# Patient Record
Sex: FEMALE | Race: WHITE | NOT HISPANIC OR LATINO | Employment: PART TIME | URBAN - NONMETROPOLITAN AREA
[De-identification: names, ages, dates, MRNs, and addresses within clinical notes are randomized per-mention and may not be internally consistent; named-entity substitution may affect disease eponyms.]

---
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
## 2020-08-19 ENCOUNTER — HOSPITAL ENCOUNTER (EMERGENCY)
Facility: HOSPITAL | Age: 20
Discharge: HOME | End: 2020-08-19
Attending: EMERGENCY MEDICINE | Admitting: EMERGENCY MEDICINE

## 2020-08-19 ENCOUNTER — APPOINTMENT (OUTPATIENT)
Dept: ULTRASOUND IMAGING | Facility: HOSPITAL | Age: 20
End: 2020-08-19

## 2020-08-19 VITALS
WEIGHT: 171 LBS | HEIGHT: 68 IN | SYSTOLIC BLOOD PRESSURE: 112 MMHG | HEART RATE: 92 BPM | OXYGEN SATURATION: 100 % | RESPIRATION RATE: 16 BRPM | DIASTOLIC BLOOD PRESSURE: 66 MMHG | TEMPERATURE: 98.6 F | BODY MASS INDEX: 25.91 KG/M2

## 2020-08-19 DIAGNOSIS — O20.0 THREATENED MISCARRIAGE IN EARLY PREGNANCY: Primary | ICD-10-CM

## 2020-08-19 DIAGNOSIS — O23.41 URINARY TRACT INFECTION AFFECTING CARE OF MOTHER IN FIRST TRIMESTER, ANTEPARTUM: ICD-10-CM

## 2020-08-19 LAB
ABO GROUP BLD: NORMAL
B-HCG UR QL: POSITIVE
BACTERIA UR QL AUTO: ABNORMAL /HPF
BASOPHILS # BLD AUTO: 0.07 10*3/MM3 (ref 0–0.2)
BASOPHILS NFR BLD AUTO: 0.7 % (ref 0–1.5)
BILIRUB UR QL STRIP: NEGATIVE
CLARITY UR: ABNORMAL
COLOR UR: YELLOW
DEPRECATED RDW RBC AUTO: 40.7 FL (ref 37–54)
EOSINOPHIL # BLD AUTO: 0.08 10*3/MM3 (ref 0–0.4)
EOSINOPHIL NFR BLD AUTO: 0.8 % (ref 0.3–6.2)
ERYTHROCYTE [DISTWIDTH] IN BLOOD BY AUTOMATED COUNT: 12.7 % (ref 12.3–15.4)
GLUCOSE UR STRIP-MCNC: NEGATIVE MG/DL
HCG INTACT+B SERPL-ACNC: NORMAL MIU/ML
HCT VFR BLD AUTO: 42.1 % (ref 34–46.6)
HGB BLD-MCNC: 14.6 G/DL (ref 12–15.9)
HGB UR QL STRIP.AUTO: ABNORMAL
HYALINE CASTS UR QL AUTO: ABNORMAL /LPF
IMM GRANULOCYTES # BLD AUTO: 0.06 10*3/MM3 (ref 0–0.05)
IMM GRANULOCYTES NFR BLD AUTO: 0.6 % (ref 0–0.5)
KETONES UR QL STRIP: NEGATIVE
LEUKOCYTE ESTERASE UR QL STRIP.AUTO: ABNORMAL
LYMPHOCYTES # BLD AUTO: 3.38 10*3/MM3 (ref 0.7–3.1)
LYMPHOCYTES NFR BLD AUTO: 32.8 % (ref 19.6–45.3)
MCH RBC QN AUTO: 30.4 PG (ref 26.6–33)
MCHC RBC AUTO-ENTMCNC: 34.7 G/DL (ref 31.5–35.7)
MCV RBC AUTO: 87.5 FL (ref 79–97)
MONOCYTES # BLD AUTO: 0.67 10*3/MM3 (ref 0.1–0.9)
MONOCYTES NFR BLD AUTO: 6.5 % (ref 5–12)
NEUTROPHILS NFR BLD AUTO: 58.6 % (ref 42.7–76)
NEUTROPHILS NFR BLD AUTO: 6.05 10*3/MM3 (ref 1.7–7)
NITRITE UR QL STRIP: NEGATIVE
NRBC BLD AUTO-RTO: 0 /100 WBC (ref 0–0.2)
PH UR STRIP.AUTO: 7 [PH] (ref 5–8)
PLATELET # BLD AUTO: 264 10*3/MM3 (ref 140–450)
PMV BLD AUTO: 10.7 FL (ref 6–12)
PROT UR QL STRIP: ABNORMAL
RBC # BLD AUTO: 4.81 10*6/MM3 (ref 3.77–5.28)
RBC # UR: ABNORMAL /HPF
REF LAB TEST METHOD: ABNORMAL
RH BLD: POSITIVE
SP GR UR STRIP: 1.02 (ref 1–1.03)
SQUAMOUS #/AREA URNS HPF: ABNORMAL /HPF
UROBILINOGEN UR QL STRIP: ABNORMAL
WBC # BLD AUTO: 10.31 10*3/MM3 (ref 3.4–10.8)
WBC UR QL AUTO: ABNORMAL /HPF

## 2020-08-19 PROCEDURE — 84702 CHORIONIC GONADOTROPIN TEST: CPT | Performed by: EMERGENCY MEDICINE

## 2020-08-19 PROCEDURE — 76817 TRANSVAGINAL US OBSTETRIC: CPT

## 2020-08-19 PROCEDURE — 81001 URINALYSIS AUTO W/SCOPE: CPT

## 2020-08-19 PROCEDURE — 85025 COMPLETE CBC W/AUTO DIFF WBC: CPT | Performed by: EMERGENCY MEDICINE

## 2020-08-19 PROCEDURE — 86900 BLOOD TYPING SEROLOGIC ABO: CPT | Performed by: EMERGENCY MEDICINE

## 2020-08-19 PROCEDURE — 99283 EMERGENCY DEPT VISIT LOW MDM: CPT

## 2020-08-19 PROCEDURE — 86901 BLOOD TYPING SEROLOGIC RH(D): CPT | Performed by: EMERGENCY MEDICINE

## 2020-08-19 PROCEDURE — 81025 URINE PREGNANCY TEST: CPT

## 2020-08-19 RX ORDER — PRENATAL VIT/IRON FUM/FOLIC AC 27MG-0.8MG
TABLET ORAL DAILY
COMMUNITY

## 2020-08-19 RX ORDER — CEPHALEXIN 500 MG/1
500 CAPSULE ORAL 4 TIMES DAILY
Qty: 28 CAPSULE | Refills: 0 | Status: SHIPPED | OUTPATIENT
Start: 2020-08-19 | End: 2021-02-08 | Stop reason: HOSPADM

## 2020-08-19 RX ORDER — CHLORAL HYDRATE 500 MG
CAPSULE ORAL
COMMUNITY
End: 2021-04-10 | Stop reason: HOSPADM

## 2020-08-19 NOTE — ED PROVIDER NOTES
Subjective   History of Present Illness    Chief Complaint: Vaginal bleeding, 6 weeks pregnant  History of Present Illness: 20-year-old female primigravida, LMP July 10, 2020, here with vaginal bleeding which she thinks  estimate would soak a pad, occurred this morning, no pelvic pain.  No urinary symptoms.  Unknown blood type.  Not had confirmatory ultrasound yet  Onset: This morning  Duration: Single episode  Exacerbating / Alleviating factors: None  Associated symptoms: None      Nurses Notes reviewed and agree, including vitals, allergies, social history and prior medical history.     REVIEW OF SYSTEMS: All systems reviewed and not pertinent unless noted.    Positive for: Vaginal bleeding in early pregnancy    Negative for: Flank pain urinary symptoms pelvic pain syncope  Review of Systems    Past Medical History:   Diagnosis Date   • Anxiety        Allergies   Allergen Reactions   • Penicillins Hives       Past Surgical History:   Procedure Laterality Date   • ADENOIDECTOMY     • TONSILLECTOMY     • WISDOM TOOTH EXTRACTION         Family History   Problem Relation Age of Onset   • Interstitial cystitis Mother        Social History     Socioeconomic History   • Marital status: Single     Spouse name: Not on file   • Number of children: Not on file   • Years of education: Not on file   • Highest education level: Not on file   Tobacco Use   • Smoking status: Never Smoker   • Smokeless tobacco: Never Used   Substance and Sexual Activity   • Alcohol use: No   • Drug use: No   • Sexual activity: Yes     Partners: Male           Objective   Physical Exam    GENERAL APPEARANCE: Well developed, healthy-appearing 20-year-old white female,  in no acute distress.  VITAL SIGNS: per nursing, reviewed and noted  SKIN: exposed skin with no rashes, ulcerations or petechiae.  Head: Normocephalic, atraumatic.   EYES: perrla. EOMI.  ENT: Normal voice.  Patient maintained wearing a mask throughout patient encounter due to  coronavirus pandemic  LUNGS:  No increased work of breathing. No retractions.   CARDIOVASCULAR:  regular rate and rhythm, no murmurs.  Good Peripheral pulses. Good cap refill to extremities.   ABDOMEN: Soft, nontender, normal bowel sounds. No hernia. No ascites.  MUSCULOSKELETAL:  No tenderness. Full ROM. Strength and tone normal.  NEUROLOGIC: Alert, oriented x 3. No gross deficits. GCS 15.   NECK: Supple, symmetric. No tenderness, no masses. Full ROM  Back: full rom, no paraspinal spasm. No CVA tenderness.   PSYCH: appropriate affect.  : no bladder tenderness or distention, no CVA tenderness      Procedures     No attending physician procedures were performed on this patient.      ED Course  ED Course as of Aug 19 1233   Wed Aug 19, 2020   0851 HCG, Urine QL(!): Positive [PF]   0924 RBC, UA(!): 3-5 [PF]   0924 WBC, UA(!): 3-5 [PF]   0924 Bacteria, UA(!): 1+ [PF]   0924 Squamous Epithelial Cells, UA(!): 7-12 [PF]   0924 Blood, UA(!): Moderate (2+) [PF]   0924 Protein, UA(!): Trace [PF]   0924 Leukocytes, UA(!): Large (3+) [PF]   0924 Nitrite, UA: Negative [PF]   0944 WBC: 10.31 [PF]   0944 Hemoglobin: 14.6 [PF]   0944 Hematocrit: 42.1 [PF]   0944 Platelets: 264 [PF]   1028 ABO Type: AB [PF]   1028 RH type: Positive [PF]   1211 HISTORY: vaginal bleeding, appr. 6 weeks gestation     TECHNIQUE: Sonographic images of the pelvis were obtained  transvaginally.     FINDINGS: A gestational sac  is present within the uterus containing a  single fetus corresponding to a six week gestation. Fetal heart tones  are present at 120 bpm. There is no evidence of a significant   subchorionic hemorrhage. The ovaries are unremarkable and demonstrate  appropriate blood flow. No adnexal masses seen. There is a small amount  of free fluid in the pelvis.     IMPRESSION:  Impression: Living intrauterine pregnancy.     This report was finalized on 8/19/2020 11:45 AM by Micaela Perez M.D..    [PF]      ED Course User Index  [PF]  Cedric Gomez, DO                                           [facility]  20-year-old primigravida presents with threatened miscarriage approximately 6 weeks gestation.  Patient blood type is AB+, ultrasound reveals a living intrauterine pregnancy without significant subchorionic hemorrhage, good blood flow to both ovaries.  Patient does have evidence of UTI.  Patient will be discharged with Keflex, positive contact her OB for follow-up.  Return precautions were discussed.  Final diagnoses:   Threatened miscarriage in early pregnancy   Urinary tract infection affecting care of mother in first trimester, antepartum            Cedric Gomez,   08/19/20 1237

## 2020-10-02 ENCOUNTER — LAB (OUTPATIENT)
Dept: LAB | Facility: HOSPITAL | Age: 20
End: 2020-10-02

## 2020-10-02 ENCOUNTER — LAB REQUISITION (OUTPATIENT)
Dept: LAB | Facility: HOSPITAL | Age: 20
End: 2020-10-02

## 2020-10-02 ENCOUNTER — TRANSCRIBE ORDERS (OUTPATIENT)
Dept: LAB | Facility: HOSPITAL | Age: 20
End: 2020-10-02

## 2020-10-02 DIAGNOSIS — Z34.81 PRENATAL CARE, SUBSEQUENT PREGNANCY, FIRST TRIMESTER: Primary | ICD-10-CM

## 2020-10-02 DIAGNOSIS — Z00.00 ROUTINE GENERAL MEDICAL EXAMINATION AT A HEALTH CARE FACILITY: ICD-10-CM

## 2020-10-02 DIAGNOSIS — Z3A.01 LESS THAN 8 WEEKS GESTATION OF PREGNANCY: ICD-10-CM

## 2020-10-02 LAB
ABO GROUP BLD: NORMAL
AMPHET+METHAMPHET UR QL: NEGATIVE
AMPHETAMINES UR QL: NEGATIVE
BARBITURATES UR QL SCN: NEGATIVE
BASOPHILS # BLD AUTO: 0.03 10*3/MM3 (ref 0–0.2)
BASOPHILS NFR BLD AUTO: 0.3 % (ref 0–1.5)
BENZODIAZ UR QL SCN: NEGATIVE
BLD GP AB SCN SERPL QL: NEGATIVE
BUPRENORPHINE SERPL-MCNC: NEGATIVE NG/ML
CANNABINOIDS SERPL QL: NEGATIVE
COCAINE UR QL: NEGATIVE
DEPRECATED RDW RBC AUTO: 42 FL (ref 37–54)
EOSINOPHIL # BLD AUTO: 0.05 10*3/MM3 (ref 0–0.4)
EOSINOPHIL NFR BLD AUTO: 0.5 % (ref 0.3–6.2)
ERYTHROCYTE [DISTWIDTH] IN BLOOD BY AUTOMATED COUNT: 13.2 % (ref 12.3–15.4)
GLUCOSE SERPL-MCNC: 85 MG/DL (ref 65–99)
HBV SURFACE AG SERPL QL IA: NORMAL
HCT VFR BLD AUTO: 40.7 % (ref 34–46.6)
HCV AB SER DONR QL: NORMAL
HGB BLD-MCNC: 13.8 G/DL (ref 12–15.9)
HIV1+2 AB SER QL: NORMAL
HOLD SPECIMEN: NORMAL
IMM GRANULOCYTES # BLD AUTO: 0.04 10*3/MM3 (ref 0–0.05)
IMM GRANULOCYTES NFR BLD AUTO: 0.4 % (ref 0–0.5)
LYMPHOCYTES # BLD AUTO: 2.12 10*3/MM3 (ref 0.7–3.1)
LYMPHOCYTES NFR BLD AUTO: 20.6 % (ref 19.6–45.3)
MCH RBC QN AUTO: 29.7 PG (ref 26.6–33)
MCHC RBC AUTO-ENTMCNC: 33.9 G/DL (ref 31.5–35.7)
MCV RBC AUTO: 87.5 FL (ref 79–97)
METHADONE UR QL SCN: NEGATIVE
MONOCYTES # BLD AUTO: 0.63 10*3/MM3 (ref 0.1–0.9)
MONOCYTES NFR BLD AUTO: 6.1 % (ref 5–12)
NEUTROPHILS NFR BLD AUTO: 7.43 10*3/MM3 (ref 1.7–7)
NEUTROPHILS NFR BLD AUTO: 72.1 % (ref 42.7–76)
NRBC BLD AUTO-RTO: 0 /100 WBC (ref 0–0.2)
OPIATES UR QL: NEGATIVE
OXYCODONE UR QL SCN: NEGATIVE
PCP UR QL SCN: NEGATIVE
PLATELET # BLD AUTO: 260 10*3/MM3 (ref 140–450)
PMV BLD AUTO: 11.5 FL (ref 6–12)
PROPOXYPH UR QL: NEGATIVE
RBC # BLD AUTO: 4.65 10*6/MM3 (ref 3.77–5.28)
RH BLD: POSITIVE
RPR SER QL: NORMAL
TRICYCLICS UR QL SCN: NEGATIVE
WBC # BLD AUTO: 10.3 10*3/MM3 (ref 3.4–10.8)

## 2020-10-02 PROCEDURE — 36415 COLL VENOUS BLD VENIPUNCTURE: CPT | Performed by: OBSTETRICS & GYNECOLOGY

## 2020-10-02 PROCEDURE — 86850 RBC ANTIBODY SCREEN: CPT | Performed by: OBSTETRICS & GYNECOLOGY

## 2020-10-02 PROCEDURE — 36415 COLL VENOUS BLD VENIPUNCTURE: CPT

## 2020-10-02 PROCEDURE — 86900 BLOOD TYPING SEROLOGIC ABO: CPT | Performed by: OBSTETRICS & GYNECOLOGY

## 2020-10-02 PROCEDURE — 80306 DRUG TEST PRSMV INSTRMNT: CPT | Performed by: OBSTETRICS & GYNECOLOGY

## 2020-10-02 PROCEDURE — 80081 OBSTETRIC PANEL INC HIV TSTG: CPT | Performed by: OBSTETRICS & GYNECOLOGY

## 2020-10-02 PROCEDURE — 85025 COMPLETE CBC W/AUTO DIFF WBC: CPT | Performed by: OBSTETRICS & GYNECOLOGY

## 2020-10-02 PROCEDURE — 87340 HEPATITIS B SURFACE AG IA: CPT | Performed by: OBSTETRICS & GYNECOLOGY

## 2020-10-02 PROCEDURE — G0432 EIA HIV-1/HIV-2 SCREEN: HCPCS | Performed by: OBSTETRICS & GYNECOLOGY

## 2020-10-02 PROCEDURE — 86901 BLOOD TYPING SEROLOGIC RH(D): CPT | Performed by: OBSTETRICS & GYNECOLOGY

## 2020-10-02 PROCEDURE — 82947 ASSAY GLUCOSE BLOOD QUANT: CPT | Performed by: OBSTETRICS & GYNECOLOGY

## 2020-10-02 PROCEDURE — 86803 HEPATITIS C AB TEST: CPT | Performed by: OBSTETRICS & GYNECOLOGY

## 2020-10-03 LAB — RUBV IGG SERPL IA-ACNC: POSITIVE

## 2020-11-30 ENCOUNTER — LAB (OUTPATIENT)
Dept: LAB | Facility: HOSPITAL | Age: 20
End: 2020-11-30

## 2020-11-30 ENCOUNTER — TRANSCRIBE ORDERS (OUTPATIENT)
Dept: LAB | Facility: HOSPITAL | Age: 20
End: 2020-11-30

## 2020-11-30 DIAGNOSIS — Z3A.20 20 WEEKS GESTATION OF PREGNANCY: Primary | ICD-10-CM

## 2020-11-30 DIAGNOSIS — Z3A.20 20 WEEKS GESTATION OF PREGNANCY: ICD-10-CM

## 2020-11-30 DIAGNOSIS — Z34.82 PRENATAL CARE, SUBSEQUENT PREGNANCY, SECOND TRIMESTER: ICD-10-CM

## 2020-11-30 DIAGNOSIS — R10.31 RLQ ABDOMINAL PAIN: ICD-10-CM

## 2020-11-30 LAB
ALP SERPL-CCNC: 86 U/L (ref 39–117)
ALT SERPL W P-5'-P-CCNC: 15 U/L (ref 1–33)
AST SERPL-CCNC: 19 U/L (ref 1–32)
BASOPHILS # BLD AUTO: 0.07 10*3/MM3 (ref 0–0.2)
BASOPHILS NFR BLD AUTO: 0.4 % (ref 0–1.5)
BILIRUB SERPL-MCNC: 0.3 MG/DL (ref 0–1.2)
CREAT SERPL-MCNC: 0.48 MG/DL (ref 0.57–1)
DEPRECATED RDW RBC AUTO: 39.6 FL (ref 37–54)
EOSINOPHIL # BLD AUTO: 0.04 10*3/MM3 (ref 0–0.4)
EOSINOPHIL NFR BLD AUTO: 0.2 % (ref 0.3–6.2)
ERYTHROCYTE [DISTWIDTH] IN BLOOD BY AUTOMATED COUNT: 12.5 % (ref 12.3–15.4)
HCT VFR BLD AUTO: 39.6 % (ref 34–46.6)
HGB BLD-MCNC: 13.2 G/DL (ref 12–15.9)
IMM GRANULOCYTES # BLD AUTO: 0.2 10*3/MM3 (ref 0–0.05)
IMM GRANULOCYTES NFR BLD AUTO: 1.1 % (ref 0–0.5)
LDH SERPL-CCNC: 205 U/L (ref 135–214)
LYMPHOCYTES # BLD AUTO: 2.85 10*3/MM3 (ref 0.7–3.1)
LYMPHOCYTES NFR BLD AUTO: 15.8 % (ref 19.6–45.3)
MCH RBC QN AUTO: 29.2 PG (ref 26.6–33)
MCHC RBC AUTO-ENTMCNC: 33.3 G/DL (ref 31.5–35.7)
MCV RBC AUTO: 87.6 FL (ref 79–97)
MONOCYTES # BLD AUTO: 0.62 10*3/MM3 (ref 0.1–0.9)
MONOCYTES NFR BLD AUTO: 3.4 % (ref 5–12)
NEUTROPHILS NFR BLD AUTO: 14.23 10*3/MM3 (ref 1.7–7)
NEUTROPHILS NFR BLD AUTO: 79.1 % (ref 42.7–76)
NRBC BLD AUTO-RTO: 0 /100 WBC (ref 0–0.2)
PLATELET # BLD AUTO: 238 10*3/MM3 (ref 140–450)
PMV BLD AUTO: 11.8 FL (ref 6–12)
RBC # BLD AUTO: 4.52 10*6/MM3 (ref 3.77–5.28)
URATE SERPL-MCNC: 3.1 MG/DL (ref 2.4–5.7)
WBC # BLD AUTO: 18.01 10*3/MM3 (ref 3.4–10.8)

## 2020-11-30 PROCEDURE — 84460 ALANINE AMINO (ALT) (SGPT): CPT

## 2020-11-30 PROCEDURE — 82247 BILIRUBIN TOTAL: CPT

## 2020-11-30 PROCEDURE — 85025 COMPLETE CBC W/AUTO DIFF WBC: CPT

## 2020-11-30 PROCEDURE — 84450 TRANSFERASE (AST) (SGOT): CPT

## 2020-11-30 PROCEDURE — 81001 URINALYSIS AUTO W/SCOPE: CPT

## 2020-11-30 PROCEDURE — 83615 LACTATE (LD) (LDH) ENZYME: CPT

## 2020-11-30 PROCEDURE — 84550 ASSAY OF BLOOD/URIC ACID: CPT

## 2020-11-30 PROCEDURE — 82565 ASSAY OF CREATININE: CPT

## 2020-11-30 PROCEDURE — 84075 ASSAY ALKALINE PHOSPHATASE: CPT

## 2020-11-30 PROCEDURE — 36415 COLL VENOUS BLD VENIPUNCTURE: CPT

## 2020-11-30 PROCEDURE — 87086 URINE CULTURE/COLONY COUNT: CPT

## 2020-12-01 LAB
AMORPH URATE CRY URNS QL MICRO: ABNORMAL /HPF
BACTERIA UR QL AUTO: ABNORMAL /HPF
BILIRUB UR QL STRIP: NEGATIVE
CLARITY UR: ABNORMAL
COLOR UR: YELLOW
GLUCOSE UR STRIP-MCNC: NEGATIVE MG/DL
HGB UR QL STRIP.AUTO: NEGATIVE
HYALINE CASTS UR QL AUTO: ABNORMAL /LPF
KETONES UR QL STRIP: ABNORMAL
LEUKOCYTE ESTERASE UR QL STRIP.AUTO: NEGATIVE
NITRITE UR QL STRIP: NEGATIVE
PH UR STRIP.AUTO: 7 [PH] (ref 5–8)
PROT UR QL STRIP: NEGATIVE
RBC # UR: ABNORMAL /HPF
REF LAB TEST METHOD: ABNORMAL
SP GR UR STRIP: 1.02 (ref 1–1.03)
SQUAMOUS #/AREA URNS HPF: ABNORMAL /HPF
UROBILINOGEN UR QL STRIP: ABNORMAL
WBC UR QL AUTO: ABNORMAL /HPF

## 2020-12-02 LAB — BACTERIA SPEC AEROBE CULT: NO GROWTH

## 2020-12-04 ENCOUNTER — HOSPITAL ENCOUNTER (OUTPATIENT)
Dept: ULTRASOUND IMAGING | Facility: HOSPITAL | Age: 20
Discharge: HOME | End: 2020-12-04

## 2021-01-22 ENCOUNTER — TRANSCRIBE ORDERS (OUTPATIENT)
Dept: LAB | Facility: HOSPITAL | Age: 21
End: 2021-01-22

## 2021-01-22 ENCOUNTER — LAB (OUTPATIENT)
Dept: LAB | Facility: HOSPITAL | Age: 21
End: 2021-01-22

## 2021-01-22 DIAGNOSIS — Z3A.28 28 WEEKS GESTATION OF PREGNANCY: Primary | ICD-10-CM

## 2021-01-22 DIAGNOSIS — Z3A.28 28 WEEKS GESTATION OF PREGNANCY: ICD-10-CM

## 2021-01-22 DIAGNOSIS — Z34.83 PRENATAL CARE, SUBSEQUENT PREGNANCY, THIRD TRIMESTER: ICD-10-CM

## 2021-01-22 LAB
BLD GP AB SCN SERPL QL: NEGATIVE
DEPRECATED RDW RBC AUTO: 38.5 FL (ref 37–54)
ERYTHROCYTE [DISTWIDTH] IN BLOOD BY AUTOMATED COUNT: 12.5 % (ref 12.3–15.4)
GLUCOSE 1H P 100 G GLC PO SERPL-MCNC: 83 MG/DL (ref 65–140)
HCT VFR BLD AUTO: 34.6 % (ref 34–46.6)
HGB BLD-MCNC: 11.7 G/DL (ref 12–15.9)
MCH RBC QN AUTO: 29 PG (ref 26.6–33)
MCHC RBC AUTO-ENTMCNC: 33.8 G/DL (ref 31.5–35.7)
MCV RBC AUTO: 85.6 FL (ref 79–97)
PLATELET # BLD AUTO: 238 10*3/MM3 (ref 140–450)
PMV BLD AUTO: 11.9 FL (ref 6–12)
RBC # BLD AUTO: 4.04 10*6/MM3 (ref 3.77–5.28)
WBC # BLD AUTO: 12.48 10*3/MM3 (ref 3.4–10.8)

## 2021-01-22 PROCEDURE — 86850 RBC ANTIBODY SCREEN: CPT

## 2021-01-22 PROCEDURE — 82950 GLUCOSE TEST: CPT | Performed by: OBSTETRICS & GYNECOLOGY

## 2021-01-22 PROCEDURE — 36415 COLL VENOUS BLD VENIPUNCTURE: CPT | Performed by: OBSTETRICS & GYNECOLOGY

## 2021-01-22 PROCEDURE — 85027 COMPLETE CBC AUTOMATED: CPT

## 2021-02-08 ENCOUNTER — HOSPITAL ENCOUNTER (OUTPATIENT)
Facility: HOSPITAL | Age: 21
Setting detail: OBSERVATION
Discharge: HOME | End: 2021-02-08
Attending: OBSTETRICS & GYNECOLOGY | Admitting: OBSTETRICS & GYNECOLOGY

## 2021-02-08 VITALS
HEIGHT: 68 IN | RESPIRATION RATE: 16 BRPM | DIASTOLIC BLOOD PRESSURE: 72 MMHG | SYSTOLIC BLOOD PRESSURE: 128 MMHG | TEMPERATURE: 98 F | WEIGHT: 190 LBS | HEART RATE: 115 BPM | OXYGEN SATURATION: 98 % | BODY MASS INDEX: 28.79 KG/M2

## 2021-02-08 PROBLEM — Z34.90 PREGNANCY: Status: ACTIVE | Noted: 2021-02-08

## 2021-02-08 LAB
BACTERIA UR QL AUTO: ABNORMAL /HPF
BILIRUB UR QL STRIP: NEGATIVE
CLARITY UR: ABNORMAL
COLOR UR: YELLOW
GLUCOSE UR STRIP-MCNC: NEGATIVE MG/DL
HGB UR QL STRIP.AUTO: NEGATIVE
HYALINE CASTS UR QL AUTO: ABNORMAL /LPF
KETONES UR QL STRIP: NEGATIVE
LEUKOCYTE ESTERASE UR QL STRIP.AUTO: ABNORMAL
NITRITE UR QL STRIP: NEGATIVE
PH UR STRIP.AUTO: 7 [PH] (ref 5–8)
PROT UR QL STRIP: NEGATIVE
RBC # UR: ABNORMAL /HPF
REF LAB TEST METHOD: ABNORMAL
SP GR UR STRIP: 1.02 (ref 1–1.03)
SQUAMOUS #/AREA URNS HPF: ABNORMAL /HPF
UROBILINOGEN UR QL STRIP: ABNORMAL
WBC UR QL AUTO: ABNORMAL /HPF

## 2021-02-08 PROCEDURE — 87086 URINE CULTURE/COLONY COUNT: CPT | Performed by: OBSTETRICS & GYNECOLOGY

## 2021-02-08 PROCEDURE — 99218 PR INITIAL OBSERVATION CARE/DAY 30 MINUTES: CPT | Performed by: OBSTETRICS & GYNECOLOGY

## 2021-02-08 PROCEDURE — G0378 HOSPITAL OBSERVATION PER HR: HCPCS

## 2021-02-08 PROCEDURE — 59025 FETAL NON-STRESS TEST: CPT | Performed by: OBSTETRICS & GYNECOLOGY

## 2021-02-08 PROCEDURE — 81001 URINALYSIS AUTO W/SCOPE: CPT | Performed by: OBSTETRICS & GYNECOLOGY

## 2021-02-08 PROCEDURE — 59025 FETAL NON-STRESS TEST: CPT

## 2021-02-08 RX ORDER — CEPHALEXIN 250 MG/1
500 CAPSULE ORAL EVERY 6 HOURS SCHEDULED
Status: DISCONTINUED | OUTPATIENT
Start: 2021-02-08 | End: 2021-02-08 | Stop reason: HOSPADM

## 2021-02-08 RX ORDER — CEPHALEXIN 500 MG/1
500 CAPSULE ORAL EVERY 6 HOURS SCHEDULED
Qty: 28 CAPSULE | Refills: 0 | Status: SHIPPED | OUTPATIENT
Start: 2021-02-08 | End: 2021-02-15

## 2021-02-08 NOTE — H&P
[facility]  Obstetric History and Physical    Referring Provider: Gwendolyn Pulliam MD      Chief Complaint   Patient presents with   • Vaginal Discharge     with spotting       Subjective     Patient is a 20 y.o. female  currently at 30w5d, who presents with vaginal discharge and spotting.  Patient noticed increased vaginal discharge and  vaginal spotting today.  Patient reports having bright red blood noted on undergarments with pelvic pressure.  She also admits to having increased vaginal discharge.  Patient denies recent trauma, fever, urinary symptoms, shortness of breath, chest pain, or loss of taste or smell.  Prenatal care by Dr. Pulliam without complications to date.        The following portions of the patients history were reviewed and updated as appropriate: current medications, allergies, past medical history, past surgical history, past family history, past social history and problem list .       Prenatal Information:   Maternal Prenatal Labs  Blood Type No results found for: ABO   Rh Status No results found for: RH   Antibody Screen No results found for: ABSCRN   Gonnorhea No results found for: GCCX   Chlamydia No results found for: CLAMYDCU   RPR No results found for: RPR   Syphilis Antibody No results found for: SYPHILIS   Rubella No results found for: RUBELLAIGGIN   Hepatitis B Surface Antigen No results found for: HEPBSAG   HIV-1 Antibody No results found for: LABHIV1   Hepatitis C Antibody No results found for: HEPCAB   Rapid Urin Drug Screen No results found for: AMPMETHU, BARBITSCNUR, LABBENZSCN, LABMETHSCN, LABOPIASCN, THCURSCR, COCAINEUR, AMPHETSCREEN, PROPOXSCN, BUPRENORSCNU, METAMPSCNUR, OXYCODONESCN, TRICYCLICSCN   Group B Strep Culture No results found for: GBSANTIGEN           External Prenatal Results     Pregnancy Outside Results - Transcribed From Office Records - See Scanned Records For Details     Test Value Date Time    Hgb 11.7 g/dL 21 1103      13.2 g/dL 20  1543      13.8 g/dL 10/02/20 1112      14.6 g/dL 20 0921    Hct 34.6 % 21 1103      39.6 % 20 1543      40.7 % 10/02/20 1112      42.1 % 20 0921    ABO AB  10/02/20 1112    Rh Positive  10/02/20 1112    Antibody Screen Negative  21 1103      Negative  10/02/20 1112    Glucose Fasting GTT       Glucose Tolerance Test 1 hour       Glucose Tolerance Test 3 hour       Gonorrhea (discrete)       Chlamydia (discrete)       RPR Non-Reactive  10/02/20 1112    VDRL       Syphilis Antibody       Rubella Positive  10/02/20 1112    HBsAg Non-Reactive  10/02/20 1112    Herpes Simplex Virus PCR       Herpes Simplex VIrus Culture       HIV Non-Reactive  10/02/20 1112    Hep C RNA Quant PCR       Hep C Antibody Non-Reactive  10/02/20 1112    AFP       Group B Strep       GBS Susceptibility to Clindamycin       GBS Susceptibility to Erythromycin       Fetal Fibronectin       Genetic Testing, Maternal Blood             Drug Screening     Test Value Date Time    Urine Drug Screen       Amphetamine Screen Negative  10/02/20 1112    Barbiturate Screen Negative  10/02/20 1112    Benzodiazepine Screen Negative  10/02/20 1112    Methadone Screen Negative  10/02/20 1112    Phencyclidine Screen Negative  10/02/20 1112    Opiates Screen Negative  10/02/20 1112    THC Screen Negative  10/02/20 1112    Cocaine Screen       Propoxyphene Screen Negative  10/02/20 1112    Buprenorphine Screen Negative  10/02/20 1112    Methamphetamine Screen       Oxycodone Screen Negative  10/02/20 1112    Tricyclic Antidepressants Screen Negative  10/02/20 1112                  Past OB History:       OB History    Para Term  AB Living   1 0 0 0 0 0   SAB TAB Ectopic Molar Multiple Live Births   0 0 0 0 0 0      # Outcome Date GA Lbr Rosendo/2nd Weight Sex Delivery Anes PTL Lv   1 Current                Past Medical History: Past Medical History:   Diagnosis Date   • Anxiety       Past Surgical History Past Surgical  History:   Procedure Laterality Date   • ADENOIDECTOMY     • TONSILLECTOMY     • WISDOM TOOTH EXTRACTION        Family History: Family History   Problem Relation Age of Onset   • Interstitial cystitis Mother       Social History:  reports that she has never smoked. She has never used smokeless tobacco.   reports no history of alcohol use.   reports no history of drug use.                   General ROS Negative Findings:Headaches, Visual Changes, Epigastric pain, Anorexia, Nausia/Vomiting and ROM    ROS     All other systems have been reviewed and are neg  Objective       Vital Signs Range for the last 24 hours  Temperature: Temp:  [98 °F (36.7 °C)] 98 °F (36.7 °C)   Temp Source: Temp src: Oral   BP: BP: (128)/(72) 128/72   Pulse: Heart Rate:  [115] 115   Respirations: Resp:  [16] 16   SPO2:     O2 Amount (l/min):     O2 Devices     Weight: Weight:  [86.2 kg (190 lb)] 86.2 kg (190 lb)     Physical Examination:   General:   alert, appears stated age and cooperative   Skin:   normal   HEENT:     Lungs:      Heart:      Gastrointestinal:  Soft, gravid uterus, guarding, rebound benign exam negative CVA tenderness   Lower Extremities:  No edema, no calf tenderness range of motion   : External genitalia: normal general appearance  Uterus: enlarged   Vaginal pH less than 4  Laura exam no pooling of fluid, no blood noted   Musculoskeletal:   No gross deformities, full range of motion upper lower extremity   Neuro:  No focal deficits, DTR 2+ 4 no clonus         Presentation: breech   Cervix: Exam by:     Dilation:  closed  thick   Effacement:     Station:  -4  No blood noted on glove.                                                 Fetal Heart Rate Assessment   Method:     Beats/min:     Baseline:     Varibility:     Accels:     Decels:     Tracing Category:     NST-indications vaginal spotting, interpretation reactive, moderate variability, accelerations present 15 x 15, no decelerations noted, onset 44, end time 1344, no  contractions noted  Uterine Assessment   Method:     Frequency (min):     Ctx Count in 10 min:     Duration:     Intensity:     Intensity by IUPC:     Resting Tone:     Resting Tone by IUPC:     [place] Units:       Laboratory Results:   Lab Results (last 24 hours)     Procedure Component Value Units Date/Time    Urinalysis, Microscopic Only - Urine, Clean Catch [[id number]]  (Abnormal) Collected: 21 1256    Specimen: Urine, Clean Catch Updated: 21 1330     RBC, UA 0-2 /HPF      WBC, UA 6-12 /HPF      Bacteria, UA 2+ /HPF      Squamous Epithelial Cells, UA 3-6 /HPF      Hyaline Casts, UA 0-6 /LPF      Methodology Automated Microscopy    Urine Culture - Urine, Urine, Clean Catch [[id number]] Collected: 21 125    Specimen: Urine, Clean Catch Updated: 21 1330    Urinalysis With Culture If Indicated - Urine, Clean Catch [[id number]]  (Abnormal) Collected: 21 125    Specimen: Urine, Clean Catch Updated: 21 1326     Color, UA Yellow     Appearance, UA Cloudy     pH, UA 7.0     Specific Gravity, UA 1.016     Glucose, UA Negative     Ketones, UA Negative     Bilirubin, UA Negative     Blood, UA Negative     Protein, UA Negative     Leuk Esterase, UA Large (3+)     Nitrite, UA Negative     Urobilinogen, UA 0.2 E.U./dL        Radiology Review:   Imaging Results (Last 24 Hours)     ** No results found for the last 24 hours. **        Other Studies: Bedside pleural ultrasound single IUP breech presentation, max vertical pocket 5 cm    Assessment/Plan       Pregnancy        Assessment:  1.  Intrauterine pregnancy at 30w5d weeks gestation with reactive fetal status.    2.  UTI culture pending  3.  False labor, no evidence of rupture of  membranes  4.  Breech presentation  5.  AB+  blood type  Plan:  1.  Discharge home, kick count,  instructions, Rx 500mg  QID # 28.  Work excuse for today and tomorrow.    2. Plan of care has been reviewed with patient.  3.  Risks, benefits of treatment  plan have been discussed.  4.  All questions have been answered.  5      Isacc Lozada, DO  2/8/2021  13:41 EST

## 2021-02-11 LAB — BACTERIA SPEC AEROBE CULT: NORMAL

## 2021-04-05 ENCOUNTER — APPOINTMENT (OUTPATIENT)
Dept: PREADMISSION TESTING | Facility: HOSPITAL | Age: 21
End: 2021-04-05

## 2021-04-05 LAB — SARS-COV-2 RNA NOSE QL NAA+PROBE: NOT DETECTED

## 2021-04-05 PROCEDURE — U0004 COV-19 TEST NON-CDC HGH THRU: HCPCS

## 2021-04-05 PROCEDURE — C9803 HOPD COVID-19 SPEC COLLECT: HCPCS

## 2021-04-08 ENCOUNTER — ANESTHESIA (OUTPATIENT)
Dept: LABOR AND DELIVERY | Facility: HOSPITAL | Age: 21
End: 2021-04-08

## 2021-04-08 ENCOUNTER — ANESTHESIA EVENT (OUTPATIENT)
Dept: LABOR AND DELIVERY | Facility: HOSPITAL | Age: 21
End: 2021-04-08

## 2021-04-08 ENCOUNTER — HOSPITAL ENCOUNTER (OUTPATIENT)
Dept: LABOR AND DELIVERY | Facility: HOSPITAL | Age: 21
Discharge: HOME | End: 2021-04-08

## 2021-04-08 ENCOUNTER — HOSPITAL ENCOUNTER (INPATIENT)
Facility: HOSPITAL | Age: 21
LOS: 2 days | Discharge: HOME | End: 2021-04-10
Attending: OBSTETRICS & GYNECOLOGY | Admitting: OBSTETRICS & GYNECOLOGY

## 2021-04-08 PROBLEM — Z37.9 NORMAL LABOR: Status: ACTIVE | Noted: 2021-04-08

## 2021-04-08 LAB
ABO GROUP BLD: NORMAL
ALP SERPL-CCNC: 335 U/L (ref 39–117)
ALT SERPL W P-5'-P-CCNC: 19 U/L (ref 1–33)
AST SERPL-CCNC: 28 U/L (ref 1–32)
ATMOSPHERIC PRESS: ABNORMAL MM[HG]
ATMOSPHERIC PRESS: ABNORMAL MM[HG]
BASE EXCESS BLDCOA CALC-SCNC: -8.8 MMOL/L (ref 0–2)
BASE EXCESS BLDCOV CALC-SCNC: -7.5 MMOL/L (ref 0–2)
BDY SITE: ABNORMAL
BDY SITE: ABNORMAL
BILIRUB SERPL-MCNC: 0.2 MG/DL (ref 0–1.2)
BLD GP AB SCN SERPL QL: NEGATIVE
BODY TEMPERATURE: 37 C
BODY TEMPERATURE: 37 C
CO2 BLDA-SCNC: 19.9 MMOL/L (ref 22–33)
CO2 BLDA-SCNC: 22.4 MMOL/L (ref 22–33)
COLLECT TME SMN: ABNORMAL
CREAT SERPL-MCNC: 0.66 MG/DL (ref 0.57–1)
DEPRECATED RDW RBC AUTO: 43.8 FL (ref 37–54)
EPAP: 0
EPAP: 0
ERYTHROCYTE [DISTWIDTH] IN BLOOD BY AUTOMATED COUNT: 14.3 % (ref 12.3–15.4)
HCO3 BLDCOA-SCNC: 20.7 MMOL/L (ref 16.9–20.5)
HCO3 BLDCOV-SCNC: 18.7 MMOL/L (ref 18.6–21.4)
HCT VFR BLD AUTO: 36.7 % (ref 34–46.6)
HGB BLD-MCNC: 11.5 G/DL (ref 12–15.9)
HGB BLDA-MCNC: 15.1 G/DL (ref 14–18)
HGB BLDA-MCNC: 15.3 G/DL (ref 14–18)
INHALED O2 CONCENTRATION: 21 %
INHALED O2 CONCENTRATION: 21 %
IPAP: 0
IPAP: 0
LDH SERPL-CCNC: 298 U/L (ref 135–214)
Lab: ABNORMAL
MCH RBC QN AUTO: 26.7 PG (ref 26.6–33)
MCHC RBC AUTO-ENTMCNC: 31.3 G/DL (ref 31.5–35.7)
MCV RBC AUTO: 85.2 FL (ref 79–97)
MODALITY: ABNORMAL
MODALITY: ABNORMAL
NOTE: ABNORMAL
NOTE: ABNORMAL
NOTIFIED BY: ABNORMAL
NOTIFIED WHO: ABNORMAL
PAW @ PEAK INSP FLOW SETTING VENT: 0 CMH2O
PAW @ PEAK INSP FLOW SETTING VENT: 0 CMH2O
PCO2 BLDCOA: 58 MMHG (ref 43.3–54.9)
PCO2 BLDCOV: 39.5 MM HG (ref 28–40)
PH BLDCOA: 7.16 PH UNITS (ref 7.22–7.3)
PH BLDCOV: 7.29 PH UNITS (ref 7.31–7.37)
PLATELET # BLD AUTO: 284 10*3/MM3 (ref 140–450)
PMV BLD AUTO: 12.4 FL (ref 6–12)
PO2 BLDCOA: ABNORMAL MM[HG]
PO2 BLDCOV: 23.8 MM HG (ref 21–31)
RBC # BLD AUTO: 4.31 10*6/MM3 (ref 3.77–5.28)
RH BLD: POSITIVE
SAO2 % BLDCOA: ABNORMAL %
SAO2 % BLDCOA: ABNORMAL %
SAO2 % BLDCOV: 48.6 %
T&S EXPIRATION DATE: NORMAL
TOTAL RATE: 0 BREATHS/MINUTE
TOTAL RATE: 0 BREATHS/MINUTE
URATE SERPL-MCNC: 4.5 MG/DL (ref 2.4–5.7)
VENTILATOR MODE: ABNORMAL
WBC # BLD AUTO: 17.34 10*3/MM3 (ref 3.4–10.8)

## 2021-04-08 PROCEDURE — 82565 ASSAY OF CREATININE: CPT | Performed by: OBSTETRICS & GYNECOLOGY

## 2021-04-08 PROCEDURE — 25010000002 BUTORPHANOL PER 1 MG: Performed by: OBSTETRICS & GYNECOLOGY

## 2021-04-08 PROCEDURE — 25010000002 ONDANSETRON PER 1 MG: Performed by: OBSTETRICS & GYNECOLOGY

## 2021-04-08 PROCEDURE — 25010000003 MORPHINE PER 10 MG: Performed by: ANESTHESIOLOGY

## 2021-04-08 PROCEDURE — 25010000002 ONDANSETRON PER 1 MG: Performed by: ANESTHESIOLOGY

## 2021-04-08 PROCEDURE — 25010000002 MIDAZOLAM PER 1 MG: Performed by: ANESTHESIOLOGY

## 2021-04-08 PROCEDURE — 3E033VJ INTRODUCTION OF OTHER HORMONE INTO PERIPHERAL VEIN, PERCUTANEOUS APPROACH: ICD-10-PCS | Performed by: OBSTETRICS & GYNECOLOGY

## 2021-04-08 PROCEDURE — 84450 TRANSFERASE (AST) (SGOT): CPT | Performed by: OBSTETRICS & GYNECOLOGY

## 2021-04-08 PROCEDURE — 25010000002 CEFAZOLIN 1-4 GM/50ML-% SOLUTION: Performed by: OBSTETRICS & GYNECOLOGY

## 2021-04-08 PROCEDURE — 83615 LACTATE (LD) (LDH) ENZYME: CPT | Performed by: OBSTETRICS & GYNECOLOGY

## 2021-04-08 PROCEDURE — 84550 ASSAY OF BLOOD/URIC ACID: CPT | Performed by: OBSTETRICS & GYNECOLOGY

## 2021-04-08 PROCEDURE — 86850 RBC ANTIBODY SCREEN: CPT | Performed by: OBSTETRICS & GYNECOLOGY

## 2021-04-08 PROCEDURE — 25010000002 GENTAMICIN PER 80 MG: Performed by: OBSTETRICS & GYNECOLOGY

## 2021-04-08 PROCEDURE — 25010000002 DIPHENHYDRAMINE PER 50 MG: Performed by: OBSTETRICS & GYNECOLOGY

## 2021-04-08 PROCEDURE — 59025 FETAL NON-STRESS TEST: CPT

## 2021-04-08 PROCEDURE — 82247 BILIRUBIN TOTAL: CPT | Performed by: OBSTETRICS & GYNECOLOGY

## 2021-04-08 PROCEDURE — 85027 COMPLETE CBC AUTOMATED: CPT | Performed by: OBSTETRICS & GYNECOLOGY

## 2021-04-08 PROCEDURE — 25010000003 CEFAZOLIN IN DEXTROSE 2-4 GM/100ML-% SOLUTION: Performed by: OBSTETRICS & GYNECOLOGY

## 2021-04-08 PROCEDURE — 36415 COLL VENOUS BLD VENIPUNCTURE: CPT | Performed by: OBSTETRICS & GYNECOLOGY

## 2021-04-08 PROCEDURE — 82805 BLOOD GASES W/O2 SATURATION: CPT

## 2021-04-08 PROCEDURE — 84075 ASSAY ALKALINE PHOSPHATASE: CPT | Performed by: OBSTETRICS & GYNECOLOGY

## 2021-04-08 PROCEDURE — 51703 INSERT BLADDER CATH COMPLEX: CPT

## 2021-04-08 PROCEDURE — 86901 BLOOD TYPING SEROLOGIC RH(D): CPT | Performed by: OBSTETRICS & GYNECOLOGY

## 2021-04-08 PROCEDURE — 84460 ALANINE AMINO (ALT) (SGPT): CPT | Performed by: OBSTETRICS & GYNECOLOGY

## 2021-04-08 PROCEDURE — 25010000002 FENTANYL CITRATE (PF) 100 MCG/2ML SOLUTION: Performed by: ANESTHESIOLOGY

## 2021-04-08 PROCEDURE — 86900 BLOOD TYPING SEROLOGIC ABO: CPT | Performed by: OBSTETRICS & GYNECOLOGY

## 2021-04-08 PROCEDURE — C1755 CATHETER, INTRASPINAL: HCPCS | Performed by: ANESTHESIOLOGY

## 2021-04-08 PROCEDURE — C1755 CATHETER, INTRASPINAL: HCPCS

## 2021-04-08 PROCEDURE — 25010000002 ROPIVACAINE PER 1 MG: Performed by: ANESTHESIOLOGY

## 2021-04-08 PROCEDURE — 25010000002 KETOROLAC TROMETHAMINE PER 15 MG: Performed by: OBSTETRICS & GYNECOLOGY

## 2021-04-08 RX ORDER — PROMETHAZINE HYDROCHLORIDE 12.5 MG/1
12.5 TABLET ORAL EVERY 6 HOURS PRN
Status: DISCONTINUED | OUTPATIENT
Start: 2021-04-08 | End: 2021-04-08 | Stop reason: SDUPTHER

## 2021-04-08 RX ORDER — SODIUM CHLORIDE, SODIUM LACTATE, POTASSIUM CHLORIDE, CALCIUM CHLORIDE 600; 310; 30; 20 MG/100ML; MG/100ML; MG/100ML; MG/100ML
125 INJECTION, SOLUTION INTRAVENOUS CONTINUOUS
Status: DISCONTINUED | OUTPATIENT
Start: 2021-04-08 | End: 2021-04-10 | Stop reason: HOSPADM

## 2021-04-08 RX ORDER — SODIUM CHLORIDE 0.9 % (FLUSH) 0.9 %
3 SYRINGE (ML) INJECTION EVERY 12 HOURS SCHEDULED
Status: DISCONTINUED | OUTPATIENT
Start: 2021-04-08 | End: 2021-04-08 | Stop reason: HOSPADM

## 2021-04-08 RX ORDER — MISOPROSTOL 200 UG/1
600 TABLET ORAL ONCE AS NEEDED
Status: DISCONTINUED | OUTPATIENT
Start: 2021-04-08 | End: 2021-04-10 | Stop reason: HOSPADM

## 2021-04-08 RX ORDER — MORPHINE SULFATE 0.5 MG/ML
INJECTION, SOLUTION EPIDURAL; INTRATHECAL; INTRAVENOUS AS NEEDED
Status: DISCONTINUED | OUTPATIENT
Start: 2021-04-08 | End: 2021-04-08 | Stop reason: SURG

## 2021-04-08 RX ORDER — LIDOCAINE HYDROCHLORIDE AND EPINEPHRINE 15; 5 MG/ML; UG/ML
INJECTION, SOLUTION EPIDURAL AS NEEDED
Status: DISCONTINUED | OUTPATIENT
Start: 2021-04-08 | End: 2021-04-08 | Stop reason: SURG

## 2021-04-08 RX ORDER — CARBOPROST TROMETHAMINE 250 UG/ML
250 INJECTION, SOLUTION INTRAMUSCULAR AS NEEDED
Status: DISCONTINUED | OUTPATIENT
Start: 2021-04-08 | End: 2021-04-08 | Stop reason: HOSPADM

## 2021-04-08 RX ORDER — FENTANYL CITRATE 50 UG/ML
INJECTION, SOLUTION INTRAMUSCULAR; INTRAVENOUS AS NEEDED
Status: DISCONTINUED | OUTPATIENT
Start: 2021-04-08 | End: 2021-04-08 | Stop reason: SURG

## 2021-04-08 RX ORDER — DIPHENHYDRAMINE HYDROCHLORIDE 50 MG/ML
25 INJECTION INTRAMUSCULAR; INTRAVENOUS ONCE
Status: COMPLETED | OUTPATIENT
Start: 2021-04-08 | End: 2021-04-08

## 2021-04-08 RX ORDER — OXYCODONE HYDROCHLORIDE AND ACETAMINOPHEN 5; 325 MG/1; MG/1
2 TABLET ORAL EVERY 4 HOURS PRN
Status: DISCONTINUED | OUTPATIENT
Start: 2021-04-08 | End: 2021-04-08 | Stop reason: SDUPTHER

## 2021-04-08 RX ORDER — PROMETHAZINE HYDROCHLORIDE 12.5 MG/1
12.5 SUPPOSITORY RECTAL EVERY 6 HOURS PRN
Status: DISCONTINUED | OUTPATIENT
Start: 2021-04-08 | End: 2021-04-10 | Stop reason: HOSPADM

## 2021-04-08 RX ORDER — ONDANSETRON 4 MG/1
4 TABLET, FILM COATED ORAL EVERY 6 HOURS PRN
Status: DISCONTINUED | OUTPATIENT
Start: 2021-04-08 | End: 2021-04-08 | Stop reason: HOSPADM

## 2021-04-08 RX ORDER — BUPIVACAINE HCL/0.9 % NACL/PF 0.125 %
PLASTIC BAG, INJECTION (ML) EPIDURAL AS NEEDED
Status: DISCONTINUED | OUTPATIENT
Start: 2021-04-08 | End: 2021-04-08 | Stop reason: SURG

## 2021-04-08 RX ORDER — SODIUM CHLORIDE 0.9 % (FLUSH) 0.9 %
10 SYRINGE (ML) INJECTION AS NEEDED
Status: DISCONTINUED | OUTPATIENT
Start: 2021-04-08 | End: 2021-04-08 | Stop reason: HOSPADM

## 2021-04-08 RX ORDER — KETOROLAC TROMETHAMINE 30 MG/ML
30 INJECTION, SOLUTION INTRAMUSCULAR; INTRAVENOUS ONCE
Status: COMPLETED | OUTPATIENT
Start: 2021-04-08 | End: 2021-04-08

## 2021-04-08 RX ORDER — TRISODIUM CITRATE DIHYDRATE AND CITRIC ACID MONOHYDRATE 500; 334 MG/5ML; MG/5ML
30 SOLUTION ORAL ONCE
Status: COMPLETED | OUTPATIENT
Start: 2021-04-08 | End: 2021-04-08

## 2021-04-08 RX ORDER — SODIUM CHLORIDE, SODIUM LACTATE, POTASSIUM CHLORIDE, CALCIUM CHLORIDE 600; 310; 30; 20 MG/100ML; MG/100ML; MG/100ML; MG/100ML
125 INJECTION, SOLUTION INTRAVENOUS CONTINUOUS
Status: DISCONTINUED | OUTPATIENT
Start: 2021-04-08 | End: 2021-04-08

## 2021-04-08 RX ORDER — OXYCODONE HYDROCHLORIDE 5 MG/1
5 TABLET ORAL EVERY 4 HOURS PRN
Status: DISCONTINUED | OUTPATIENT
Start: 2021-04-08 | End: 2021-04-10 | Stop reason: HOSPADM

## 2021-04-08 RX ORDER — ONDANSETRON 2 MG/ML
4 INJECTION INTRAMUSCULAR; INTRAVENOUS ONCE
Status: DISCONTINUED | OUTPATIENT
Start: 2021-04-08 | End: 2021-04-08 | Stop reason: HOSPADM

## 2021-04-08 RX ORDER — OXYTOCIN 10 [USP'U]/ML
INJECTION, SOLUTION INTRAMUSCULAR; INTRAVENOUS AS NEEDED
Status: DISCONTINUED | OUTPATIENT
Start: 2021-04-08 | End: 2021-04-08 | Stop reason: SURG

## 2021-04-08 RX ORDER — ONDANSETRON 4 MG/1
4 TABLET, FILM COATED ORAL EVERY 8 HOURS PRN
Status: DISCONTINUED | OUTPATIENT
Start: 2021-04-08 | End: 2021-04-10 | Stop reason: HOSPADM

## 2021-04-08 RX ORDER — CALCIUM CARBONATE 200(500)MG
1 TABLET,CHEWABLE ORAL EVERY 6 HOURS PRN
Status: DISCONTINUED | OUTPATIENT
Start: 2021-04-08 | End: 2021-04-10 | Stop reason: HOSPADM

## 2021-04-08 RX ORDER — DIPHENHYDRAMINE HCL 25 MG
25 CAPSULE ORAL EVERY 4 HOURS PRN
Status: DISCONTINUED | OUTPATIENT
Start: 2021-04-08 | End: 2021-04-10 | Stop reason: HOSPADM

## 2021-04-08 RX ORDER — LIDOCAINE HYDROCHLORIDE AND EPINEPHRINE 20; 5 MG/ML; UG/ML
INJECTION, SOLUTION EPIDURAL; INFILTRATION; INTRACAUDAL; PERINEURAL AS NEEDED
Status: DISCONTINUED | OUTPATIENT
Start: 2021-04-08 | End: 2021-04-08 | Stop reason: SURG

## 2021-04-08 RX ORDER — ONDANSETRON 2 MG/ML
4 INJECTION INTRAMUSCULAR; INTRAVENOUS EVERY 6 HOURS PRN
Status: DISCONTINUED | OUTPATIENT
Start: 2021-04-08 | End: 2021-04-08

## 2021-04-08 RX ORDER — IBUPROFEN 600 MG/1
600 TABLET ORAL EVERY 6 HOURS
Status: DISCONTINUED | OUTPATIENT
Start: 2021-04-10 | End: 2021-04-10 | Stop reason: HOSPADM

## 2021-04-08 RX ORDER — ACETAMINOPHEN 325 MG/1
650 TABLET ORAL EVERY 4 HOURS PRN
Status: DISCONTINUED | OUTPATIENT
Start: 2021-04-08 | End: 2021-04-08 | Stop reason: HOSPADM

## 2021-04-08 RX ORDER — PRENATAL VIT/IRON FUM/FOLIC AC 27MG-0.8MG
1 TABLET ORAL DAILY
Status: DISCONTINUED | OUTPATIENT
Start: 2021-04-09 | End: 2021-04-10 | Stop reason: HOSPADM

## 2021-04-08 RX ORDER — ACETAMINOPHEN 500 MG
1000 TABLET ORAL EVERY 6 HOURS
Status: COMPLETED | OUTPATIENT
Start: 2021-04-09 | End: 2021-04-09

## 2021-04-08 RX ORDER — OXYTOCIN-SODIUM CHLORIDE 0.9% IV SOLN 30 UNIT/500ML 30-0.9/5 UT/ML-%
650 SOLUTION INTRAVENOUS ONCE
Status: DISCONTINUED | OUTPATIENT
Start: 2021-04-08 | End: 2021-04-08 | Stop reason: HOSPADM

## 2021-04-08 RX ORDER — SIMETHICONE 80 MG
80 TABLET,CHEWABLE ORAL 4 TIMES DAILY PRN
Status: DISCONTINUED | OUTPATIENT
Start: 2021-04-08 | End: 2021-04-10 | Stop reason: HOSPADM

## 2021-04-08 RX ORDER — ACETAMINOPHEN 325 MG/1
650 TABLET ORAL EVERY 6 HOURS
Status: DISCONTINUED | OUTPATIENT
Start: 2021-04-10 | End: 2021-04-10 | Stop reason: HOSPADM

## 2021-04-08 RX ORDER — DOCUSATE SODIUM 100 MG/1
100 CAPSULE, LIQUID FILLED ORAL 2 TIMES DAILY PRN
Status: DISCONTINUED | OUTPATIENT
Start: 2021-04-08 | End: 2021-04-10 | Stop reason: HOSPADM

## 2021-04-08 RX ORDER — GENTAMICIN SULFATE 60 MG/50ML
120 INJECTION, SOLUTION INTRAVENOUS ONCE
Status: COMPLETED | OUTPATIENT
Start: 2021-04-08 | End: 2021-04-08

## 2021-04-08 RX ORDER — MAGNESIUM CARB/ALUMINUM HYDROX 105-160MG
30 TABLET,CHEWABLE ORAL ONCE
Status: DISCONTINUED | OUTPATIENT
Start: 2021-04-08 | End: 2021-04-08 | Stop reason: HOSPADM

## 2021-04-08 RX ORDER — KETOROLAC TROMETHAMINE 15 MG/ML
15 INJECTION, SOLUTION INTRAMUSCULAR; INTRAVENOUS EVERY 6 HOURS
Status: COMPLETED | OUTPATIENT
Start: 2021-04-09 | End: 2021-04-10

## 2021-04-08 RX ORDER — ACETAMINOPHEN 500 MG
1000 TABLET ORAL ONCE
Status: COMPLETED | OUTPATIENT
Start: 2021-04-08 | End: 2021-04-08

## 2021-04-08 RX ORDER — SODIUM CHLORIDE 0.9 % (FLUSH) 0.9 %
3-10 SYRINGE (ML) INJECTION AS NEEDED
Status: DISCONTINUED | OUTPATIENT
Start: 2021-04-08 | End: 2021-04-10 | Stop reason: HOSPADM

## 2021-04-08 RX ORDER — DIPHENHYDRAMINE HCL 25 MG
25 CAPSULE ORAL EVERY 4 HOURS PRN
Status: DISCONTINUED | OUTPATIENT
Start: 2021-04-08 | End: 2021-04-08 | Stop reason: SDUPTHER

## 2021-04-08 RX ORDER — SODIUM CHLORIDE 0.9 % (FLUSH) 0.9 %
3 SYRINGE (ML) INJECTION EVERY 12 HOURS SCHEDULED
Status: DISCONTINUED | OUTPATIENT
Start: 2021-04-08 | End: 2021-04-10 | Stop reason: HOSPADM

## 2021-04-08 RX ORDER — OXYCODONE HYDROCHLORIDE 5 MG/1
10 TABLET ORAL EVERY 4 HOURS PRN
Status: DISCONTINUED | OUTPATIENT
Start: 2021-04-08 | End: 2021-04-10 | Stop reason: HOSPADM

## 2021-04-08 RX ORDER — DIPHENHYDRAMINE HYDROCHLORIDE 50 MG/ML
25 INJECTION INTRAMUSCULAR; INTRAVENOUS EVERY 4 HOURS PRN
Status: DISCONTINUED | OUTPATIENT
Start: 2021-04-08 | End: 2021-04-10 | Stop reason: HOSPADM

## 2021-04-08 RX ORDER — OXYTOCIN-SODIUM CHLORIDE 0.9% IV SOLN 30 UNIT/500ML 30-0.9/5 UT/ML-%
85 SOLUTION INTRAVENOUS ONCE
Status: DISCONTINUED | OUTPATIENT
Start: 2021-04-08 | End: 2021-04-10 | Stop reason: HOSPADM

## 2021-04-08 RX ORDER — TRISODIUM CITRATE DIHYDRATE AND CITRIC ACID MONOHYDRATE 500; 334 MG/5ML; MG/5ML
30 SOLUTION ORAL ONCE
Status: DISCONTINUED | OUTPATIENT
Start: 2021-04-08 | End: 2021-04-08 | Stop reason: HOSPADM

## 2021-04-08 RX ORDER — MORPHINE SULFATE 2 MG/ML
2 INJECTION, SOLUTION INTRAMUSCULAR; INTRAVENOUS
Status: DISCONTINUED | OUTPATIENT
Start: 2021-04-08 | End: 2021-04-08 | Stop reason: SDUPTHER

## 2021-04-08 RX ORDER — LANOLIN
CREAM (ML) TOPICAL
Status: DISCONTINUED | OUTPATIENT
Start: 2021-04-08 | End: 2021-04-10 | Stop reason: HOSPADM

## 2021-04-08 RX ORDER — ONDANSETRON 2 MG/ML
4 INJECTION INTRAMUSCULAR; INTRAVENOUS EVERY 6 HOURS PRN
Status: DISCONTINUED | OUTPATIENT
Start: 2021-04-08 | End: 2021-04-08 | Stop reason: HOSPADM

## 2021-04-08 RX ORDER — PROMETHAZINE HYDROCHLORIDE 12.5 MG/1
12.5 TABLET ORAL EVERY 6 HOURS PRN
Status: DISCONTINUED | OUTPATIENT
Start: 2021-04-08 | End: 2021-04-08 | Stop reason: HOSPADM

## 2021-04-08 RX ORDER — OXYTOCIN-SODIUM CHLORIDE 0.9% IV SOLN 30 UNIT/500ML 30-0.9/5 UT/ML-%
650 SOLUTION INTRAVENOUS ONCE
Status: DISCONTINUED | OUTPATIENT
Start: 2021-04-08 | End: 2021-04-10 | Stop reason: HOSPADM

## 2021-04-08 RX ORDER — CEFAZOLIN SODIUM 1 G/50ML
1 INJECTION, SOLUTION INTRAVENOUS EVERY 8 HOURS
Status: DISCONTINUED | OUTPATIENT
Start: 2021-04-08 | End: 2021-04-08 | Stop reason: HOSPADM

## 2021-04-08 RX ORDER — MISOPROSTOL 200 UG/1
800 TABLET ORAL AS NEEDED
Status: DISCONTINUED | OUTPATIENT
Start: 2021-04-08 | End: 2021-04-08 | Stop reason: HOSPADM

## 2021-04-08 RX ORDER — METHYLERGONOVINE MALEATE 0.2 MG/ML
200 INJECTION INTRAVENOUS ONCE AS NEEDED
Status: DISCONTINUED | OUTPATIENT
Start: 2021-04-08 | End: 2021-04-08 | Stop reason: HOSPADM

## 2021-04-08 RX ORDER — FAMOTIDINE 10 MG/ML
20 INJECTION, SOLUTION INTRAVENOUS ONCE AS NEEDED
Status: COMPLETED | OUTPATIENT
Start: 2021-04-08 | End: 2021-04-08

## 2021-04-08 RX ORDER — CEFAZOLIN SODIUM 2 G/100ML
2 INJECTION, SOLUTION INTRAVENOUS ONCE
Status: COMPLETED | OUTPATIENT
Start: 2021-04-08 | End: 2021-04-08

## 2021-04-08 RX ORDER — LIDOCAINE HYDROCHLORIDE 10 MG/ML
5 INJECTION, SOLUTION EPIDURAL; INFILTRATION; INTRACAUDAL; PERINEURAL AS NEEDED
Status: DISCONTINUED | OUTPATIENT
Start: 2021-04-08 | End: 2021-04-08 | Stop reason: HOSPADM

## 2021-04-08 RX ORDER — OXYTOCIN-SODIUM CHLORIDE 0.9% IV SOLN 30 UNIT/500ML 30-0.9/5 UT/ML-%
2-20 SOLUTION INTRAVENOUS
Status: DISCONTINUED | OUTPATIENT
Start: 2021-04-08 | End: 2021-04-08

## 2021-04-08 RX ORDER — MIDAZOLAM HYDROCHLORIDE 1 MG/ML
INJECTION INTRAMUSCULAR; INTRAVENOUS AS NEEDED
Status: DISCONTINUED | OUTPATIENT
Start: 2021-04-08 | End: 2021-04-08 | Stop reason: SURG

## 2021-04-08 RX ORDER — HYDROMORPHONE HYDROCHLORIDE 1 MG/ML
0.5 INJECTION, SOLUTION INTRAMUSCULAR; INTRAVENOUS; SUBCUTANEOUS
Status: DISCONTINUED | OUTPATIENT
Start: 2021-04-08 | End: 2021-04-08 | Stop reason: HOSPADM

## 2021-04-08 RX ORDER — PROMETHAZINE HYDROCHLORIDE 12.5 MG/1
12.5 SUPPOSITORY RECTAL EVERY 6 HOURS PRN
Status: DISCONTINUED | OUTPATIENT
Start: 2021-04-08 | End: 2021-04-08 | Stop reason: SDUPTHER

## 2021-04-08 RX ORDER — NALBUPHINE HCL 10 MG/ML
3 AMPUL (ML) INJECTION
Status: DISCONTINUED | OUTPATIENT
Start: 2021-04-08 | End: 2021-04-10 | Stop reason: HOSPADM

## 2021-04-08 RX ORDER — DIPHENHYDRAMINE HYDROCHLORIDE 50 MG/ML
12.5 INJECTION INTRAMUSCULAR; INTRAVENOUS EVERY 8 HOURS PRN
Status: DISCONTINUED | OUTPATIENT
Start: 2021-04-08 | End: 2021-04-08

## 2021-04-08 RX ORDER — NALOXONE HCL 0.4 MG/ML
0.4 VIAL (ML) INJECTION
Status: ACTIVE | OUTPATIENT
Start: 2021-04-08 | End: 2021-04-09

## 2021-04-08 RX ORDER — CLINDAMYCIN PHOSPHATE 900 MG/50ML
900 INJECTION INTRAVENOUS ONCE
Status: COMPLETED | OUTPATIENT
Start: 2021-04-08 | End: 2021-04-08

## 2021-04-08 RX ORDER — ONDANSETRON 2 MG/ML
4 INJECTION INTRAMUSCULAR; INTRAVENOUS ONCE AS NEEDED
Status: DISCONTINUED | OUTPATIENT
Start: 2021-04-08 | End: 2021-04-08

## 2021-04-08 RX ORDER — ONDANSETRON 2 MG/ML
INJECTION INTRAMUSCULAR; INTRAVENOUS AS NEEDED
Status: DISCONTINUED | OUTPATIENT
Start: 2021-04-08 | End: 2021-04-08 | Stop reason: SURG

## 2021-04-08 RX ORDER — PROMETHAZINE HYDROCHLORIDE 25 MG/1
25 TABLET ORAL EVERY 6 HOURS PRN
Status: DISCONTINUED | OUTPATIENT
Start: 2021-04-08 | End: 2021-04-10 | Stop reason: HOSPADM

## 2021-04-08 RX ORDER — OXYTOCIN-SODIUM CHLORIDE 0.9% IV SOLN 30 UNIT/500ML 30-0.9/5 UT/ML-%
85 SOLUTION INTRAVENOUS ONCE
Status: DISCONTINUED | OUTPATIENT
Start: 2021-04-08 | End: 2021-04-08 | Stop reason: HOSPADM

## 2021-04-08 RX ORDER — SODIUM CHLORIDE, SODIUM LACTATE, POTASSIUM CHLORIDE, CALCIUM CHLORIDE 600; 310; 30; 20 MG/100ML; MG/100ML; MG/100ML; MG/100ML
INJECTION, SOLUTION INTRAVENOUS CONTINUOUS PRN
Status: DISCONTINUED | OUTPATIENT
Start: 2021-04-08 | End: 2021-04-08 | Stop reason: SURG

## 2021-04-08 RX ORDER — EPHEDRINE SULFATE 5 MG/ML
10 INJECTION INTRAVENOUS
Status: DISCONTINUED | OUTPATIENT
Start: 2021-04-08 | End: 2021-04-08 | Stop reason: HOSPADM

## 2021-04-08 RX ORDER — IBUPROFEN 600 MG/1
600 TABLET ORAL EVERY 6 HOURS PRN
Status: DISCONTINUED | OUTPATIENT
Start: 2021-04-08 | End: 2021-04-08 | Stop reason: SDUPTHER

## 2021-04-08 RX ORDER — ACETAMINOPHEN 325 MG/1
650 TABLET ORAL EVERY 4 HOURS PRN
Status: DISCONTINUED | OUTPATIENT
Start: 2021-04-08 | End: 2021-04-08 | Stop reason: SDUPTHER

## 2021-04-08 RX ORDER — HYDROCORTISONE 25 MG/G
1 CREAM TOPICAL AS NEEDED
Status: DISCONTINUED | OUTPATIENT
Start: 2021-04-08 | End: 2021-04-10 | Stop reason: HOSPADM

## 2021-04-08 RX ORDER — CARBOPROST TROMETHAMINE 250 UG/ML
250 INJECTION, SOLUTION INTRAMUSCULAR ONCE AS NEEDED
Status: DISCONTINUED | OUTPATIENT
Start: 2021-04-08 | End: 2021-04-10 | Stop reason: HOSPADM

## 2021-04-08 RX ORDER — FAMOTIDINE 10 MG/ML
INJECTION, SOLUTION INTRAVENOUS AS NEEDED
Status: DISCONTINUED | OUTPATIENT
Start: 2021-04-08 | End: 2021-04-08 | Stop reason: SURG

## 2021-04-08 RX ORDER — METHYLERGONOVINE MALEATE 0.2 MG/ML
200 INJECTION INTRAVENOUS ONCE AS NEEDED
Status: DISCONTINUED | OUTPATIENT
Start: 2021-04-08 | End: 2021-04-10 | Stop reason: HOSPADM

## 2021-04-08 RX ORDER — ONDANSETRON 4 MG/1
4 TABLET, FILM COATED ORAL EVERY 6 HOURS PRN
Status: DISCONTINUED | OUTPATIENT
Start: 2021-04-08 | End: 2021-04-08

## 2021-04-08 RX ADMIN — Medication 200 MCG: at 19:51

## 2021-04-08 RX ADMIN — MIDAZOLAM 1.5 MG: 1 INJECTION INTRAMUSCULAR; INTRAVENOUS at 19:54

## 2021-04-08 RX ADMIN — FAMOTIDINE 20 MG: 10 INJECTION, SOLUTION INTRAVENOUS at 19:24

## 2021-04-08 RX ADMIN — CEFAZOLIN SODIUM 2 G: 2 INJECTION, SOLUTION INTRAVENOUS at 02:18

## 2021-04-08 RX ADMIN — SODIUM CHLORIDE, POTASSIUM CHLORIDE, SODIUM LACTATE AND CALCIUM CHLORIDE 2000 ML/HR: 600; 310; 30; 20 INJECTION, SOLUTION INTRAVENOUS at 08:38

## 2021-04-08 RX ADMIN — OXYTOCIN 2 MILLI-UNITS/MIN: 10 INJECTION INTRAVENOUS at 02:22

## 2021-04-08 RX ADMIN — BUTORPHANOL TARTRATE 2 MG: 2 INJECTION, SOLUTION INTRAMUSCULAR; INTRAVENOUS at 04:30

## 2021-04-08 RX ADMIN — LIDOCAINE HYDROCHLORIDE,EPINEPHRINE BITARTRATE 5 ML: 20; .005 INJECTION, SOLUTION EPIDURAL; INFILTRATION; INTRACAUDAL; PERINEURAL at 19:33

## 2021-04-08 RX ADMIN — MIDAZOLAM 1 MG: 1 INJECTION INTRAMUSCULAR; INTRAVENOUS at 20:05

## 2021-04-08 RX ADMIN — SODIUM CHLORIDE, POTASSIUM CHLORIDE, SODIUM LACTATE AND CALCIUM CHLORIDE: 600; 310; 30; 20 INJECTION, SOLUTION INTRAVENOUS at 19:22

## 2021-04-08 RX ADMIN — MORPHINE SULFATE 1 MG: 0.5 INJECTION, SOLUTION EPIDURAL; INTRATHECAL; INTRAVENOUS at 19:50

## 2021-04-08 RX ADMIN — BUTORPHANOL TARTRATE 2 MG: 2 INJECTION, SOLUTION INTRAMUSCULAR; INTRAVENOUS at 06:50

## 2021-04-08 RX ADMIN — LIDOCAINE HYDROCHLORIDE AND EPINEPHRINE 3 ML: 15; 5 INJECTION, SOLUTION EPIDURAL at 09:21

## 2021-04-08 RX ADMIN — FENTANYL CITRATE 25 MCG: 50 INJECTION, SOLUTION INTRAMUSCULAR; INTRAVENOUS at 19:54

## 2021-04-08 RX ADMIN — CEFAZOLIN SODIUM 1 G: 1 INJECTION, SOLUTION INTRAVENOUS at 11:14

## 2021-04-08 RX ADMIN — GENTAMICIN SULFATE 120 MG: 60 INJECTION, SOLUTION INTRAVENOUS at 20:11

## 2021-04-08 RX ADMIN — ONDANSETRON 4 MG: 2 INJECTION INTRAMUSCULAR; INTRAVENOUS at 10:20

## 2021-04-08 RX ADMIN — ONDANSETRON 4 MG: 2 INJECTION INTRAMUSCULAR; INTRAVENOUS at 19:24

## 2021-04-08 RX ADMIN — ACETAMINOPHEN 1000 MG: 500 TABLET, FILM COATED ORAL at 21:53

## 2021-04-08 RX ADMIN — SODIUM CHLORIDE, POTASSIUM CHLORIDE, SODIUM LACTATE AND CALCIUM CHLORIDE 125 ML/HR: 600; 310; 30; 20 INJECTION, SOLUTION INTRAVENOUS at 16:08

## 2021-04-08 RX ADMIN — SODIUM CITRATE AND CITRIC ACID MONOHYDRATE 30 ML: 500; 334 SOLUTION ORAL at 19:14

## 2021-04-08 RX ADMIN — OXYTOCIN 20 UNITS: 10 INJECTION, SOLUTION INTRAMUSCULAR; INTRAVENOUS at 19:55

## 2021-04-08 RX ADMIN — ROPIVACAINE HYDROCHLORIDE 12 ML: 5 INJECTION, SOLUTION EPIDURAL; INFILTRATION; PERINEURAL at 09:23

## 2021-04-08 RX ADMIN — CLINDAMYCIN PHOSPHATE 900 MG: 900 INJECTION, SOLUTION INTRAVENOUS at 19:18

## 2021-04-08 RX ADMIN — FENTANYL CITRATE 25 MCG: 50 INJECTION, SOLUTION INTRAMUSCULAR; INTRAVENOUS at 19:50

## 2021-04-08 RX ADMIN — MORPHINE SULFATE 4 MG: 0.5 INJECTION, SOLUTION EPIDURAL; INTRATHECAL; INTRAVENOUS at 19:46

## 2021-04-08 RX ADMIN — LIDOCAINE HYDROCHLORIDE,EPINEPHRINE BITARTRATE 4 ML: 20; .005 INJECTION, SOLUTION EPIDURAL; INFILTRATION; INTRACAUDAL; PERINEURAL at 19:25

## 2021-04-08 RX ADMIN — MIDAZOLAM 1 MG: 1 INJECTION INTRAMUSCULAR; INTRAVENOUS at 19:43

## 2021-04-08 RX ADMIN — LIDOCAINE HYDROCHLORIDE AND EPINEPHRINE 2 ML: 15; 5 INJECTION, SOLUTION EPIDURAL at 09:22

## 2021-04-08 RX ADMIN — SODIUM CHLORIDE, POTASSIUM CHLORIDE, SODIUM LACTATE AND CALCIUM CHLORIDE 125 ML/HR: 600; 310; 30; 20 INJECTION, SOLUTION INTRAVENOUS at 09:37

## 2021-04-08 RX ADMIN — FAMOTIDINE 20 MG: 10 INJECTION, SOLUTION INTRAVENOUS at 17:00

## 2021-04-08 RX ADMIN — ONDANSETRON 4 MG: 2 INJECTION INTRAMUSCULAR; INTRAVENOUS at 16:10

## 2021-04-08 RX ADMIN — SODIUM CHLORIDE, POTASSIUM CHLORIDE, SODIUM LACTATE AND CALCIUM CHLORIDE 125 ML/HR: 600; 310; 30; 20 INJECTION, SOLUTION INTRAVENOUS at 02:00

## 2021-04-08 RX ADMIN — FENTANYL CITRATE 50 MCG: 50 INJECTION, SOLUTION INTRAMUSCULAR; INTRAVENOUS at 19:43

## 2021-04-08 RX ADMIN — DIPHENHYDRAMINE HYDROCHLORIDE 25 MG: 50 INJECTION INTRAMUSCULAR; INTRAVENOUS at 03:36

## 2021-04-08 RX ADMIN — FENTANYL CITRATE 100 MCG: 50 INJECTION, SOLUTION INTRAMUSCULAR; INTRAVENOUS at 10:01

## 2021-04-08 RX ADMIN — CEFAZOLIN SODIUM 1 G: 1 INJECTION, SOLUTION INTRAVENOUS at 18:46

## 2021-04-08 RX ADMIN — LIDOCAINE HYDROCHLORIDE,EPINEPHRINE BITARTRATE 8 ML: 20; .005 INJECTION, SOLUTION EPIDURAL; INFILTRATION; INTRACAUDAL; PERINEURAL at 19:17

## 2021-04-08 RX ADMIN — Medication 15 ML/HR: at 10:04

## 2021-04-08 RX ADMIN — SODIUM CHLORIDE, POTASSIUM CHLORIDE, SODIUM LACTATE AND CALCIUM CHLORIDE 1000 ML: 600; 310; 30; 20 INJECTION, SOLUTION INTRAVENOUS at 01:12

## 2021-04-08 RX ADMIN — KETOROLAC TROMETHAMINE 30 MG: 30 INJECTION, SOLUTION INTRAMUSCULAR; INTRAVENOUS at 21:54

## 2021-04-08 RX ADMIN — MIDAZOLAM 1.5 MG: 1 INJECTION INTRAMUSCULAR; INTRAVENOUS at 19:29

## 2021-04-08 NOTE — PROGRESS NOTES
[facility]  Terrie Hogan  : 2000  MRN: [id number]  CSN: [id number]    Labor progress note    Subjective   She has been pushing for an hour with little descent.   Objective   Min/max vitals past 24 hours:  Temp  Min: 97.7 °F (36.5 °C)  Max: 98.6 °F (37 °C)   BP  Min: 97/55  Max: 157/72   Pulse  Min: 80  Max: 118   Resp  Min: 16  Max: 18        FHT's: non-reassuring, elevated baseline, variable decelerations are present and category 2   Cervix: was checked (by me): 10 cm / 100 % / 0   Contractions: regular every 3 minutes - external monitors used        Assessment   1. IUP at 39w1d  2. Arrest of descent     Plan   1.  section   Recurrent deep variables were noted. I discussed proceeding with  delivery given remote from birth with fetal intolerance. Risks and benefits were reviewd in addition to possible complications including those related to anesthesia, hemorrhage, infection, and damage to internal structues. Patient agrees with proceeding with .      Pola Zamorano CNM  2021  19:16 EDT

## 2021-04-08 NOTE — H&P
27[facility]  Obstetric History and Physical    Chief Complaint   Patient presents with   • Rupture of Membranes       Subjective     Patient is a 21 y.o. female  currently at 39w1d, who presents for PROM. She states SROM occurred at 2245 last evening. She is currently feeling contractions that are getting stronger. She has found some relief with IV pain meds. She plans an epidural for labor and birth.     Her prenatal care is benign.  Her previous obstetric/gynecological history is non-contributory.    The following portions of the patients history were reviewed and updated as appropriate: current medications, allergies, past medical history, past surgical history, past family history, past social history and problem list .       Prenatal Information:   Maternal Prenatal Labs  Blood Type ABO Type   Date Value Ref Range Status   2021 AB  Final      Rh Status RH type   Date Value Ref Range Status   2021 Positive  Final      Antibody Screen Antibody Screen   Date Value Ref Range Status   2021 Negative  Final      Gonnorhea No results found for: GCCX   Chlamydia No results found for: CLAMYDCU   RPR No results found for: RPR   Syphilis Antibody No results found for: SYPHILIS   Rubella No results found for: RUBELLAIGGIN   Hepatitis B Surface Antigen No results found for: HEPBSAG   HIV-1 Antibody No results found for: LABHIV1   Hepatitis C Antibody No results found for: HEPCAB   Rapid Urin Drug Screen No results found for: AMPMETHU, BARBITSCNUR, LABBENZSCN, LABMETHSCN, LABOPIASCN, THCURSCR, COCAINEUR, AMPHETSCREEN, PROPOXSCN, BUPRENORSCNU, METAMPSCNUR, OXYCODONESCN, TRICYCLICSCN   Group B Strep Culture Positive                 Past OB History:       OB History    Para Term  AB Living   1 0 0 0 0 0   SAB TAB Ectopic Molar Multiple Live Births   0 0 0 0 0 0      # Outcome Date GA Lbr Rosendo/2nd Weight Sex Delivery Anes PTL Lv   1 Current                Past Medical History: Past Medical  History:   Diagnosis Date   • Anxiety       Past Surgical History Past Surgical History:   Procedure Laterality Date   • ADENOIDECTOMY     • TONSILLECTOMY     • WISDOM TOOTH EXTRACTION        Family History: Family History   Problem Relation Age of Onset   • Interstitial cystitis Mother       Social History:  reports that she has never smoked. She has never used smokeless tobacco.   reports no history of alcohol use.   reports no history of drug use.        REVIEW OF SYSTEMS             Reports fetal movement is normal             Reports leakage of amniotic fluid.             Denies vaginal bleeding             She reports Regular contractions, frequency: Every 3-5 minutes, intensity moderate             All other systems reviewed and are negative    Objective       Vital Signs Range for the last 24 hours  Temperature: Temp:  [97.7 °F (36.5 °C)-98.3 °F (36.8 °C)] 97.7 °F (36.5 °C)   Temp Source: Temp src: Oral   BP: BP: (122-140)/(62-80) 122/76   Pulse: Heart Rate:  [] 95   Respirations: Resp:  [16-18] 16   SPO2:     O2 Amount (l/min):     O2 Devices Device (Oxygen Therapy): room air   Weight: Weight:  [89.8 kg (198 lb)] 89.8 kg (198 lb)       Presentation: vertex   Cervix: Exam by:     Dilation:  2   Effacement: Cervical Effacement: 60%   Station:  -2       Fetal Heart Rate Assessment   Method: Fetal HR Assessment Method: external   Beats/min: Fetal HR (beats/min): 115   Baseline: Fetal Heart Baseline Rate: normal range   Varibility: Fetal HR Variability: moderate (amplitude range 6 to 25 bpm)   Accels: Fetal HR Accelerations: greater than/equal to 15 bpm, lasting at least 15 seconds   Decels: Fetal HR Decelerations: absent   Tracing Category:  1    NST: REACTIVE     Uterine Assessment   Method: Method: external tocotransducer   Frequency (min): Contraction Frequency (Minutes): x2 (difficult tracing)   Ctx Count in 10 min:     Duration:     Intensity: Contraction Intensity: no contractions   Intensity by  IUPC:     Resting Tone: Uterine Resting Tone: soft by palpation   Resting Tone by IUPC:     [place] Units:             Constitutional:  Well developed, well nourished, no acute distress, well-groomed.   Respiratory:  Lungs are clear to auscultation bilaterally, normal breath sounds.   Cardiovascular:  Normal rate and rhythm, no murmurs.   Gastrointestinal:  Soft, gravid, nontender.  Uterus: Soft, nontender. Fundus appropriate for dates.  Neurologic:  Alert & oriented x 3,  no focal deficits noted.   Psychiatric:  Speech and behavior appropriate.   Extremities: no cyanosis, clubbing or edema, no evidence of DVT.        Labs:  Lab Results   Component Value Date    WBC 17.34 (H) 04/08/2021    HGB 11.5 (L) 04/08/2021    HCT 36.7 04/08/2021    MCV 85.2 04/08/2021     04/08/2021    URICACID 4.5 04/08/2021    AST 28 04/08/2021    ALT 19 04/08/2021     (H) 04/08/2021     Results from last 7 days   Lab Units 04/08/21  0120   ABO TYPING  AB   RH TYPING  Positive   ANTIBODY SCREEN  Negative           Assessment/Plan       Normal labor        Assessment:  1.  Intrauterine pregnancy at 39w1d weeks gestation with reactive fetal status.    2.   PROM  3.  Obstetrical history is non-contributory.  4.  GBS status: Positive    Plan:  1.Oxytocin induction and Antibiotics for GBS prophylaxis  2. Plan of care has been reviewed with patient and questions answered.  3.  Risks, benefits of treatment plan have been discussed.  4.  All questions have been answered.        Pola Zamorano CNM  4/8/2021  07:46 EDT

## 2021-04-08 NOTE — PROGRESS NOTES
Laborist intrapartum note    --Asked to assess fetal position as there has been limited descent during second stage of labor to this point.  Bedside ultrasound confirms fetal spine along the maternal right.  Fetus is in asynclitic ROT position.    --Repetitive deep variable decelerations with pushing.  Given minimal descent and evidence of fetal intolerance of labor recommend proceeding with primary  section.

## 2021-04-08 NOTE — ANESTHESIA PREPROCEDURE EVALUATION
Anesthesia Evaluation     Patient summary reviewed and Nursing notes reviewed   NPO Solid Status: > 6 hours  NPO Liquid Status: < 2 hours           Airway   Mallampati: II  TM distance: >3 FB  Neck ROM: full  No difficulty expected  Dental      Pulmonary - negative pulmonary ROS   Cardiovascular - negative cardio ROS        Neuro/Psych  (+) psychiatric history Anxiety,     GI/Hepatic/Renal/Endo - negative ROS     Musculoskeletal (-) negative ROS    Abdominal    Substance History - negative use     OB/GYN    (+) Pregnant,         Other                        Anesthesia Plan    ASA 2     epidural       Anesthetic plan, all risks, benefits, and alternatives have been provided, discussed and informed consent has been obtained with: patient.  Use of blood products discussed with patient .

## 2021-04-08 NOTE — PROGRESS NOTES
[facility]  Terrie Hogan  : 2000  MRN: [id number]  CSN: [id number]    Labor progress note    Subjective   She is having good pain control with the epidural.     Objective   Min/max vitals past 24 hours:  Temp  Min: 97.7 °F (36.5 °C)  Max: 98.3 °F (36.8 °C)   BP  Min: 97/55  Max: 157/72   Pulse  Min: 80  Max: 118   Resp  Min: 16  Max: 18        FHT's: reassuring and category 2   Cervix: was checked (by me): 5 cm / 80 % / -1   Contractions: regular every 3 minutes - external monitors used        Assessment   1. IUP at 39w1d  2. Progressing in labor  3. Fetal status reassuring     Plan   1. Continue with induction     Pola Zamorano CNM  2021  13:10 EDT

## 2021-04-08 NOTE — ANESTHESIA PROCEDURE NOTES
Labor Epidural      Patient reassessed immediately prior to procedure    Patient location during procedure: OB  Performed By  Anesthesiologist: Buddy Hernandez DO  Preanesthetic Checklist  Completed: patient identified, IV checked, site marked, risks and benefits discussed, surgical consent, monitors and equipment checked, pre-op evaluation and timeout performed  Prep:  Pt Position:sitting  Sterile Tech:gloves, mask, sterile barrier and cap  Prep:chlorhexidine gluconate and isopropyl alcohol  Monitoring:blood pressure monitoring and continuous pulse oximetry  Epidural Block Procedure:  Approach:midline  Guidance:landmark technique and palpation technique  Location:L3-L4  Needle Type:Tuohy  Needle Gauge:17 G  Loss of Resistance Medium: air  Loss of Resistance: 5cm  Cath Depth at skin:10 cm  Paresthesia: none  Aspiration:negative  Test Dose:negative  Number of Attempts: 1  Post Assessment:  Dressing:secured with tape and occlusive dressing applied (Tegaderm Placed)  Pt Tolerance:patient tolerated the procedure well with no apparent complications  Complications:no

## 2021-04-09 PROBLEM — Z34.90 PREGNANCY: Status: RESOLVED | Noted: 2021-02-08 | Resolved: 2021-04-09

## 2021-04-09 PROBLEM — Z37.9 NORMAL LABOR: Status: RESOLVED | Noted: 2021-04-08 | Resolved: 2021-04-09

## 2021-04-09 LAB
BASOPHILS # BLD AUTO: 0.06 10*3/MM3 (ref 0–0.2)
BASOPHILS NFR BLD AUTO: 0.3 % (ref 0–1.5)
DEPRECATED RDW RBC AUTO: 46 FL (ref 37–54)
EOSINOPHIL # BLD AUTO: 0.09 10*3/MM3 (ref 0–0.4)
EOSINOPHIL NFR BLD AUTO: 0.5 % (ref 0.3–6.2)
ERYTHROCYTE [DISTWIDTH] IN BLOOD BY AUTOMATED COUNT: 14.7 % (ref 12.3–15.4)
HCT VFR BLD AUTO: 30.1 % (ref 34–46.6)
HGB BLD-MCNC: 9.2 G/DL (ref 12–15.9)
IMM GRANULOCYTES # BLD AUTO: 0.12 10*3/MM3 (ref 0–0.05)
IMM GRANULOCYTES NFR BLD AUTO: 0.7 % (ref 0–0.5)
LYMPHOCYTES # BLD AUTO: 2.23 10*3/MM3 (ref 0.7–3.1)
LYMPHOCYTES NFR BLD AUTO: 12.4 % (ref 19.6–45.3)
MCH RBC QN AUTO: 26.7 PG (ref 26.6–33)
MCHC RBC AUTO-ENTMCNC: 30.6 G/DL (ref 31.5–35.7)
MCV RBC AUTO: 87.5 FL (ref 79–97)
MONOCYTES # BLD AUTO: 0.98 10*3/MM3 (ref 0.1–0.9)
MONOCYTES NFR BLD AUTO: 5.5 % (ref 5–12)
NEUTROPHILS NFR BLD AUTO: 14.47 10*3/MM3 (ref 1.7–7)
NEUTROPHILS NFR BLD AUTO: 80.6 % (ref 42.7–76)
NRBC BLD AUTO-RTO: 0 /100 WBC (ref 0–0.2)
PLATELET # BLD AUTO: 176 10*3/MM3 (ref 140–450)
PMV BLD AUTO: 12.5 FL (ref 6–12)
RBC # BLD AUTO: 3.44 10*6/MM3 (ref 3.77–5.28)
WBC # BLD AUTO: 17.95 10*3/MM3 (ref 3.4–10.8)

## 2021-04-09 PROCEDURE — 63710000001 DIPHENHYDRAMINE PER 50 MG: Performed by: ANESTHESIOLOGY

## 2021-04-09 PROCEDURE — 25010000002 KETOROLAC TROMETHAMINE PER 15 MG: Performed by: OBSTETRICS & GYNECOLOGY

## 2021-04-09 PROCEDURE — 85025 COMPLETE CBC W/AUTO DIFF WBC: CPT | Performed by: OBSTETRICS & GYNECOLOGY

## 2021-04-09 PROCEDURE — 63710000001 ONDANSETRON PER 8 MG: Performed by: OBSTETRICS & GYNECOLOGY

## 2021-04-09 PROCEDURE — 25010000002 DIPHENHYDRAMINE PER 50 MG: Performed by: ANESTHESIOLOGY

## 2021-04-09 RX ADMIN — ACETAMINOPHEN 1000 MG: 500 TABLET, FILM COATED ORAL at 04:33

## 2021-04-09 RX ADMIN — ACETAMINOPHEN 1000 MG: 500 TABLET, FILM COATED ORAL at 16:49

## 2021-04-09 RX ADMIN — SIMETHICONE 80 MG: 80 TABLET, CHEWABLE ORAL at 08:47

## 2021-04-09 RX ADMIN — PRENATAL VITAMINS-IRON FUMARATE 27 MG IRON-FOLIC ACID 0.8 MG TABLET 1 TABLET: at 08:47

## 2021-04-09 RX ADMIN — KETOROLAC TROMETHAMINE 15 MG: 15 INJECTION, SOLUTION INTRAMUSCULAR; INTRAVENOUS at 14:17

## 2021-04-09 RX ADMIN — KETOROLAC TROMETHAMINE 15 MG: 15 INJECTION, SOLUTION INTRAMUSCULAR; INTRAVENOUS at 20:02

## 2021-04-09 RX ADMIN — OXYCODONE 5 MG: 5 TABLET ORAL at 22:11

## 2021-04-09 RX ADMIN — ACETAMINOPHEN 1000 MG: 500 TABLET, FILM COATED ORAL at 10:25

## 2021-04-09 RX ADMIN — Medication: at 02:31

## 2021-04-09 RX ADMIN — KETOROLAC TROMETHAMINE 15 MG: 15 INJECTION, SOLUTION INTRAMUSCULAR; INTRAVENOUS at 06:44

## 2021-04-09 RX ADMIN — ACETAMINOPHEN 1000 MG: 500 TABLET, FILM COATED ORAL at 22:08

## 2021-04-09 RX ADMIN — ONDANSETRON HYDROCHLORIDE 4 MG: 4 TABLET, FILM COATED ORAL at 02:32

## 2021-04-09 RX ADMIN — DIPHENHYDRAMINE HYDROCHLORIDE 25 MG: 50 INJECTION INTRAMUSCULAR; INTRAVENOUS at 04:33

## 2021-04-09 RX ADMIN — DIPHENHYDRAMINE HYDROCHLORIDE 25 MG: 25 CAPSULE ORAL at 23:28

## 2021-04-09 RX ADMIN — DOCUSATE SODIUM 100 MG: 100 CAPSULE, LIQUID FILLED ORAL at 08:47

## 2021-04-09 RX ADMIN — HYDROCORTISONE 2.5% 1 APPLICATION: 25 CREAM TOPICAL at 23:28

## 2021-04-09 NOTE — PLAN OF CARE
Goal Outcome Evaluation:  Plan of Care Reviewed With: patient, spouse  Progress: improving  Outcome Summary: VSS, uterus firm, lochia small-moderate initially but improving, incision WDL, breastfeeding

## 2021-04-09 NOTE — PROGRESS NOTES
2021    Name:Terrie Hogan    MR#:[id number]     PROGRESS NOTE:  Post-Op 1 S/P        Subjective   21 y.o. yo Female  s/p CS at 39w1d doing well. Pain well controlled, lochia appropriate. Parker patent to BSD    Patient Active Problem List   Diagnosis   • Delivery by  section of full-term infant        Objective    Vitals  Temp:  Temp:  [97.3 °F (36.3 °C)-99.6 °F (37.6 °C)] 98.3 °F (36.8 °C)  Temp src: Oral  BP:  BP: ()/() 113/59  Pulse:  Heart Rate:  [] 83  RR:   Resp:  [16-18] 18    General Awake, alert, no distress  Abdomen Soft, non-distended, fundus firm, below umbilicus, appropriately tender  Incision  Intact, no erythema or exudate  Extremities Calves NT bilaterally     I/O last 3 completed shifts:  In: 1700 [I.V.:1400; Other:300]  Out: 2700 [Urine:2700]    Lab Results   Component Value Date    WBC 17.34 (H) 2021    HGB 11.5 (L) 2021    HCT 36.7 2021    MCV 85.2 2021     2021    URICACID 4.5 2021    AST 28 2021    ALT 19 2021    HEPBSAG Non-Reactive 10/02/2020     Results from last 7 days   Lab Units 21  0120   ABO TYPING  AB   RH TYPING  Positive   ANTIBODY SCREEN  Negative       Infant: male       Assessment   1.  POD 1 from  Section    Plan: Doing well.    D/C catheter, Saline lock iv, advance diet, may shower.          Floresita Adkins CNM  2021 08:55 EDT

## 2021-04-09 NOTE — ANESTHESIA POSTPROCEDURE EVALUATION
Patient: Terrie Hogan    Procedure Summary     Date: 21 Room / Location: [facility] LABOR DELIVERY   [facility]    Anesthesia Start: 910 Anesthesia Stop:     Procedure:  SECTION PRIMARY (N/A Abdomen) Diagnosis:     Surgeons: Lavelle Ortega III, MD Provider: uBddy Hernandez DO    Anesthesia Type: epidural ASA Status: 2          Anesthesia Type: epidural    Vitals  Vitals Value Taken Time   /83 21   Temp 99.0°F 21   Pulse 122 21   Resp 16 21   SpO2 99 % 21   Vitals shown include unvalidated device data.        Post Anesthesia Care and Evaluation    Patient location during evaluation: bedside  Patient participation: complete - patient participated  Level of consciousness: awake  Pain score: 0  Pain management: satisfactory to patient  Airway patency: patent  Anesthetic complications: No anesthetic complications  PONV Status: none  Cardiovascular status: acceptable and hemodynamically stable  Respiratory status: acceptable  Hydration status: acceptable

## 2021-04-09 NOTE — ANESTHESIA POSTPROCEDURE EVALUATION
Patient: Terrie Hogan    Procedure Summary     Date: 21 Room / Location: [facility] LABOR DELIVERY   [facility]    Anesthesia Start: 910 Anesthesia Stop:     Procedure:  SECTION PRIMARY (N/A Abdomen) Diagnosis:     Surgeons: Lavelle Ortega III, MD Provider: Buddy Hernandez DO    Anesthesia Type: epidural ASA Status: 2          Anesthesia Type: epidural    Vitals  Vitals Value Taken Time   /60 21 0445   Temp 98 °F (36.7 °C) 21 0445   Pulse 70 21 0445   Resp 16 21 0445   SpO2 98 % 219   Vitals shown include unvalidated device data.        Post Anesthesia Care and Evaluation    Patient location during evaluation: bedside  Patient participation: complete - patient participated  Level of consciousness: awake and alert  Pain management: adequate  Airway patency: patent  Anesthetic complications: No anesthetic complications    Cardiovascular status: acceptable  Respiratory status: acceptable  Hydration status: acceptable  Post Neuraxial Block status: Motor and sensory function returned to baseline and No signs or symptoms of PDPH

## 2021-04-09 NOTE — LACTATION NOTE
Patients 1st child. Instructed in importance of skin to skin. Encouraged and instructed importance of waking infant and attempting to nurse every 2-3hrs. Instructed waking techniques. Instructed presence of and importance of colostrum.  Instructed in ss adq latch and suck including ss complications to report. Instructed in ss adq infant intake. Encouraged to call for LC, next feeding, to be able to verify Infant latch and suck.  VU

## 2021-04-09 NOTE — OP NOTE
Op Note      Preoperative diagnosis  1)IUP 39 weeks 1 day gestation  2) failure to progress/arrest of descent.  3) fetal intolerance of labor    Operative diagnosis  1)same    Procedure  1) primary  (LTUI)    Surgeon  1) Lavelle Ortega III, M.D.    Anesthesia  Epidural    Indications  The patient is a 21-year-old primigravida white female admitted at 39 weeks 1 day gestation.  She underwent Pitocin augmentation of labor and progressed to complete cervical dilation.  After 1 hour of second stage labor, the fetus was noted to exhibit minimal descent.  Evaluation confirmed the fetus to be in asynclitic occiput transverse position.  I was unsuccessful at rotating the fetus.  Fetal heart rate decelerations developed and the patient developed moderate to severe repetitive variable decelerations.  A decision was made to proceed with  section secondary to arrest of descent and fetal intolerance of labor.  Preoperatively, she received gentamicin and clindamycin for antibiotic prophylaxis.  Pneumatic compression stockings were placed for prophylaxis against venous thromboembolism.    Intraoperative findings  1) 7 lb 11.6 oz male, apgars 8 at 1 minute and 9 at 5 minutes    Operative procedure  Patient was taken to the operative suite and prepped and draped in the left lateral recumbent position with a Parker catheter in place.  Adequate regional anesthesia was confirmed.  A transverse Quique Cameron skin incision was created and the fascia was exposed.  The fascia was opened transversely.  The recti were  in the midline.  The peritoneal cavity was entered sharply with the incision being extended transversely.  An Addy abdominal sidewall retractor was placed.  The bladder was localized.  A transverse hysterotomy incision was created above the level of the vesicouterine reflection.  The incision was extended by applying craniocaudad traction to the lower segment.  The fetal head was elevated and the  "infant was noted to have an occult cord prolapse.  The head was delivered and the infant was bulb suction.  The body was delivered.  The cord was milked x4 and then clamped and cut.  The infant was resuscitated by  resuscitation team.    A segment of cord was obtained for cord pH.  Cord blood was then obtained.  The placenta was delivered.  The uterus was externalized and explored.  The uterus was closed with 2 layers of 0 PDS.  The first was running layer and the second running imbricating layer.  There was good hemostasis noted.  The vesicouterine reflection and retrouterine space were irrigated and suctioned free of blood and clots.  Uterus was placed in abdominal cavity and the gutters were sponged free of blood clots.  The anterior peritoneum was closed transversely with running suture of 3-0 Vicryl.  The muscle bellies were plicated with figure-of-eight sutures of 3-0 Vicryl.  The fascia was closed with running suture of 0 PDS.  Hemostasis was obtained the subcutaneous space.  Space was closed with 2 running subcutaneous layers of 3-0 Vicryl.  The skin incision was closed first with a running subcuticular layer of 4-0 Monocryl followed by application of surgical skin glue.  There were no complications.  Sponge, instrument and needle counts were correct at the conclusion of the procedure.  EBL was approximately 1000 mL.  The patient was taken to the PACU in stable condition.    EBL: 1000 mL    Specimens: Cord pH    Drains: simon to strait drain    Complications: None      [facility] \"Imelda\" EDDIE Ortega MD  21  20:52 EDT    "

## 2021-04-10 VITALS
DIASTOLIC BLOOD PRESSURE: 73 MMHG | SYSTOLIC BLOOD PRESSURE: 122 MMHG | HEART RATE: 94 BPM | BODY MASS INDEX: 30.01 KG/M2 | RESPIRATION RATE: 16 BRPM | OXYGEN SATURATION: 99 % | HEIGHT: 68 IN | TEMPERATURE: 98.7 F | WEIGHT: 198 LBS

## 2021-04-10 PROCEDURE — 25010000002 KETOROLAC TROMETHAMINE PER 15 MG: Performed by: OBSTETRICS & GYNECOLOGY

## 2021-04-10 RX ORDER — FERROUS SULFATE 325(65) MG
325 TABLET ORAL
Qty: 90 TABLET | Refills: 1 | Status: ON HOLD | OUTPATIENT
Start: 2021-04-10 | End: 2022-08-04 | Stop reason: SDUPTHER

## 2021-04-10 RX ORDER — LANOLIN
CREAM (ML) TOPICAL
Start: 2021-04-10

## 2021-04-10 RX ORDER — DOCUSATE SODIUM 100 MG/1
100 CAPSULE, LIQUID FILLED ORAL 2 TIMES DAILY PRN
Qty: 30 CAPSULE | Refills: 1 | Status: ON HOLD | OUTPATIENT
Start: 2021-04-10 | End: 2022-08-04 | Stop reason: SDUPTHER

## 2021-04-10 RX ORDER — ACETAMINOPHEN 325 MG/1
650 TABLET ORAL EVERY 6 HOURS
Qty: 100 TABLET | Refills: 1 | Status: SHIPPED | OUTPATIENT
Start: 2021-04-10

## 2021-04-10 RX ORDER — OXYCODONE HYDROCHLORIDE 5 MG/1
5 TABLET ORAL EVERY 4 HOURS PRN
Qty: 10 TABLET | Refills: 0 | Status: SHIPPED | OUTPATIENT
Start: 2021-04-10 | End: 2021-04-15

## 2021-04-10 RX ORDER — IBUPROFEN 600 MG/1
600 TABLET ORAL EVERY 6 HOURS
Qty: 60 TABLET | Refills: 0 | Status: SHIPPED | OUTPATIENT
Start: 2021-04-10 | End: 2022-07-30 | Stop reason: HOSPADM

## 2021-04-10 RX ADMIN — KETOROLAC TROMETHAMINE 15 MG: 15 INJECTION, SOLUTION INTRAMUSCULAR; INTRAVENOUS at 01:12

## 2021-04-10 RX ADMIN — IBUPROFEN 600 MG: 600 TABLET, FILM COATED ORAL at 13:55

## 2021-04-10 RX ADMIN — IBUPROFEN 600 MG: 600 TABLET, FILM COATED ORAL at 07:38

## 2021-04-10 RX ADMIN — SIMETHICONE 80 MG: 80 TABLET, CHEWABLE ORAL at 05:14

## 2021-04-10 RX ADMIN — OXYCODONE 5 MG: 5 TABLET ORAL at 10:38

## 2021-04-10 RX ADMIN — OXYCODONE 10 MG: 5 TABLET ORAL at 04:20

## 2021-04-10 RX ADMIN — ACETAMINOPHEN 650 MG: 325 TABLET ORAL at 05:11

## 2021-04-10 RX ADMIN — ACETAMINOPHEN 650 MG: 325 TABLET ORAL at 13:55

## 2021-04-10 RX ADMIN — DOCUSATE SODIUM 100 MG: 100 CAPSULE, LIQUID FILLED ORAL at 05:14

## 2021-04-10 RX ADMIN — PRENATAL VITAMINS-IRON FUMARATE 27 MG IRON-FOLIC ACID 0.8 MG TABLET 1 TABLET: at 10:13

## 2021-04-10 NOTE — DISCHARGE SUMMARY
[facility]   Discharge Summary      Patient: Terrie Hogan      MR#:[id number]  Admission  Diagnosis:   Problems Addressed this Visit        Gravid and     Delivery by  section of full-term infant - Primary    Relevant Medications    oxyCODONE (ROXICODONE) 5 MG immediate release tablet      Diagnoses       Codes Comments    Delivery by  section of full-term infant    -  Primary ICD-10-CM: O82  ICD-9-CM: 669.70         Postpartum anemia       Discharge Diagnosis:   1. Delivery by  section of full-term infant        Date of Admission: 2021  Date of Discharge:  4/10/2021    Procedures:  , Low Transverse     2021    7:41 PM      Service:  Obstetrics    Hospital Course:  Patient underwent  section and remained in the hospital for 2 days.  During that time she remained afebrile and hemodynamically stable.  On the day of discharge, she was eating, ambulating and voiding without difficulty.      Labs  Lab Results   Component Value Date    WBC 17.95 (H) 2021    HGB 9.2 (L) 2021    HCT 30.1 (L) 2021    MCV 87.5 2021     2021    URICACID 4.5 2021    AST 28 2021    ALT 19 2021     (H) 2021     Results from last 7 days   Lab Units 21  0120   ABO TYPING  AB   RH TYPING  Positive   ANTIBODY SCREEN  Negative       Discharge Medications     Discharge Medications      New Medications      Instructions Start Date   acetaminophen 325 MG tablet  Commonly known as: TYLENOL   650 mg, Oral, Every 6 Hours      docusate sodium 100 MG capsule  Commonly known as: COLACE   100 mg, Oral, 2 Times Daily PRN      ferrous sulfate 325 (65 FE) MG tablet   325 mg, Oral, 2 Times Daily Before Meals      ibuprofen 600 MG tablet  Commonly known as: ADVIL,MOTRIN   600 mg, Oral, Every 6 Hours      lanolin cream   Topical, Every 1 Hour PRN      oxyCODONE 5 MG immediate release tablet  Commonly known as:  ROXICODONE   5 mg, Oral, Every 4 Hours PRN         Continue These Medications      Instructions Start Date   prenatal (CLASSIC) vitamin tablet  Generic drug: prenatal vitamin   Oral, Daily         Stop These Medications    fish oil 1000 MG capsule capsule            Discharge Disposition:  To Home    Discharge Condition:  Stable    Discharge Diet: regular    Activity at Discharge: pelvic rest    Follow-up Appointments  2 weeks with Floresita Adkins CNM  04/10/21  13:51 EDT

## 2021-04-10 NOTE — PROGRESS NOTES
4/10/2021    Name:Terrie Hogan    MR#:[id number]     PROGRESS NOTE:  Post-Op 2 S/P        Subjective   21 y.o. yo Female  s/p CS at 39w1d doing well. Pain well controlled, lochia appropriate, tolerating diet. Voiding and stooling without difficulty. Requests to be discharged today.    Patient Active Problem List   Diagnosis   • Delivery by  section of full-term infant   • Postpartum anemia        Objective    Vitals  Temp:  Temp:  [97.7 °F (36.5 °C)-98.7 °F (37.1 °C)] 98.7 °F (37.1 °C)  Temp src: Oral  BP:  BP: (111-132)/(58-73) 122/73  Pulse:  Heart Rate:  [70-94] 94  RR:   Resp:  [16-18] 16    General Awake, alert, no distress  Abdomen Soft, non-distended, fundus firm, below umbilicus, appropriately tender  Incision  Intact, no erythema or exudate  Extremities Calves NT bilaterally     I/O last 3 completed shifts:  In: 1400 [I.V.:1400]  Out: 5850 [Urine:5850]    Lab Results   Component Value Date    WBC 17.95 (H) 2021    HGB 9.2 (L) 2021    HCT 30.1 (L) 2021    MCV 87.5 2021     2021    URICACID 4.5 2021    AST 28 2021    ALT 19 2021     (H) 2021    HEPBSAG Non-Reactive 10/02/2020     Results from last 7 days   Lab Units 21  0120   ABO TYPING  AB   RH TYPING  Positive   ANTIBODY SCREEN  Negative       Infant: male       Assessment   1.  POD 2 from  Section    Plan: Doing well.    Discharge home today.  Routine postoperative discharge instructions.  RTO 2 weeks      Floresita Adkins CNM  4/10/2021 13:39 EDT

## 2021-04-10 NOTE — PROGRESS NOTES
Case Management Discharge Note      Final Note: Plan is for pt. to dc to home. No CM needs have been noted. Family to provide transport.         Selected Continued Care - Admitted Since 4/8/2021     Destination    No services have been selected for the patient.              Durable Medical Equipment    No services have been selected for the patient.              Dialysis/Infusion    No services have been selected for the patient.              Home Medical Care    No services have been selected for the patient.              Therapy    No services have been selected for the patient.              Community Resources    No services have been selected for the patient.                       Final Discharge Disposition Code: 01 - home or self-care

## 2022-01-19 ENCOUNTER — LAB (OUTPATIENT)
Dept: LAB | Facility: HOSPITAL | Age: 22
End: 2022-01-19

## 2022-01-19 DIAGNOSIS — Z03.818 ENCOUNTER FOR PATIENT CONCERN ABOUT EXPOSURE TO INFECTIOUS ORGANISM: Primary | ICD-10-CM

## 2022-01-19 LAB — SARS-COV-2 RNA NOSE QL NAA+PROBE: DETECTED

## 2022-01-19 PROCEDURE — U0004 COV-19 TEST NON-CDC HGH THRU: HCPCS

## 2022-01-19 PROCEDURE — C9803 HOPD COVID-19 SPEC COLLECT: HCPCS

## 2022-03-07 ENCOUNTER — LAB (OUTPATIENT)
Dept: LAB | Facility: HOSPITAL | Age: 22
End: 2022-03-07

## 2022-03-07 ENCOUNTER — TRANSCRIBE ORDERS (OUTPATIENT)
Dept: LAB | Facility: HOSPITAL | Age: 22
End: 2022-03-07

## 2022-03-07 DIAGNOSIS — Z34.82 PRENATAL CARE, SUBSEQUENT PREGNANCY, SECOND TRIMESTER: ICD-10-CM

## 2022-03-07 DIAGNOSIS — Z34.82 PRENATAL CARE, SUBSEQUENT PREGNANCY, SECOND TRIMESTER: Primary | ICD-10-CM

## 2022-03-07 LAB
ABO GROUP BLD: NORMAL
AMPHET+METHAMPHET UR QL: NEGATIVE
AMPHETAMINES UR QL: NEGATIVE
BARBITURATES UR QL SCN: NEGATIVE
BASOPHILS # BLD AUTO: 0.03 10*3/MM3 (ref 0–0.2)
BASOPHILS NFR BLD AUTO: 0.3 % (ref 0–1.5)
BENZODIAZ UR QL SCN: NEGATIVE
BLD GP AB SCN SERPL QL: NEGATIVE
BUPRENORPHINE SERPL-MCNC: NEGATIVE NG/ML
CANNABINOIDS SERPL QL: NEGATIVE
COCAINE UR QL: NEGATIVE
DEPRECATED RDW RBC AUTO: 39 FL (ref 37–54)
EOSINOPHIL # BLD AUTO: 0.09 10*3/MM3 (ref 0–0.4)
EOSINOPHIL NFR BLD AUTO: 0.8 % (ref 0.3–6.2)
ERYTHROCYTE [DISTWIDTH] IN BLOOD BY AUTOMATED COUNT: 13.3 % (ref 12.3–15.4)
HBV SURFACE AG SERPL QL IA: NORMAL
HCT VFR BLD AUTO: 38.1 % (ref 34–46.6)
HCV AB SER DONR QL: NORMAL
HGB BLD-MCNC: 13.1 G/DL (ref 12–15.9)
HIV1+2 AB SER QL: NORMAL
IMM GRANULOCYTES # BLD AUTO: 0.04 10*3/MM3 (ref 0–0.05)
IMM GRANULOCYTES NFR BLD AUTO: 0.3 % (ref 0–0.5)
LYMPHOCYTES # BLD AUTO: 2.47 10*3/MM3 (ref 0.7–3.1)
LYMPHOCYTES NFR BLD AUTO: 21.6 % (ref 19.6–45.3)
MCH RBC QN AUTO: 28.4 PG (ref 26.6–33)
MCHC RBC AUTO-ENTMCNC: 34.4 G/DL (ref 31.5–35.7)
MCV RBC AUTO: 82.6 FL (ref 79–97)
METHADONE UR QL SCN: NEGATIVE
MONOCYTES # BLD AUTO: 0.74 10*3/MM3 (ref 0.1–0.9)
MONOCYTES NFR BLD AUTO: 6.5 % (ref 5–12)
NEUTROPHILS NFR BLD AUTO: 70.5 % (ref 42.7–76)
NEUTROPHILS NFR BLD AUTO: 8.07 10*3/MM3 (ref 1.7–7)
NRBC BLD AUTO-RTO: 0 /100 WBC (ref 0–0.2)
OPIATES UR QL: NEGATIVE
OXYCODONE UR QL SCN: NEGATIVE
PCP UR QL SCN: NEGATIVE
PLATELET # BLD AUTO: 272 10*3/MM3 (ref 140–450)
PMV BLD AUTO: 12.2 FL (ref 6–12)
PROPOXYPH UR QL: NEGATIVE
RBC # BLD AUTO: 4.61 10*6/MM3 (ref 3.77–5.28)
RH BLD: POSITIVE
TRICYCLICS UR QL SCN: NEGATIVE
TSH SERPL DL<=0.05 MIU/L-ACNC: 0.59 UIU/ML (ref 0.27–4.2)
WBC NRBC COR # BLD: 11.44 10*3/MM3 (ref 3.4–10.8)

## 2022-03-07 PROCEDURE — 86901 BLOOD TYPING SEROLOGIC RH(D): CPT

## 2022-03-07 PROCEDURE — 84443 ASSAY THYROID STIM HORMONE: CPT

## 2022-03-07 PROCEDURE — 36415 COLL VENOUS BLD VENIPUNCTURE: CPT

## 2022-03-07 PROCEDURE — 85025 COMPLETE CBC W/AUTO DIFF WBC: CPT

## 2022-03-07 PROCEDURE — 86780 TREPONEMA PALLIDUM: CPT

## 2022-03-07 PROCEDURE — 86803 HEPATITIS C AB TEST: CPT

## 2022-03-07 PROCEDURE — 86900 BLOOD TYPING SEROLOGIC ABO: CPT

## 2022-03-07 PROCEDURE — 86850 RBC ANTIBODY SCREEN: CPT

## 2022-03-07 PROCEDURE — G0432 EIA HIV-1/HIV-2 SCREEN: HCPCS

## 2022-03-07 PROCEDURE — 87340 HEPATITIS B SURFACE AG IA: CPT

## 2022-03-07 PROCEDURE — 80306 DRUG TEST PRSMV INSTRMNT: CPT

## 2022-03-08 LAB — TREPONEMA PALLIDUM IGG+IGM AB [PRESENCE] IN SERUM OR PLASMA BY IMMUNOASSAY: NON REACTIVE

## 2022-03-16 ENCOUNTER — HOSPITAL ENCOUNTER (EMERGENCY)
Facility: HOSPITAL | Age: 22
Discharge: HOME | End: 2022-03-17
Attending: EMERGENCY MEDICINE | Admitting: EMERGENCY MEDICINE

## 2022-03-16 DIAGNOSIS — O20.0 THREATENED MISCARRIAGE: Primary | ICD-10-CM

## 2022-03-16 PROCEDURE — 99283 EMERGENCY DEPT VISIT LOW MDM: CPT

## 2022-03-17 ENCOUNTER — APPOINTMENT (OUTPATIENT)
Dept: ULTRASOUND IMAGING | Facility: HOSPITAL | Age: 22
End: 2022-03-17

## 2022-03-17 VITALS
BODY MASS INDEX: 28.31 KG/M2 | HEART RATE: 87 BPM | DIASTOLIC BLOOD PRESSURE: 63 MMHG | HEIGHT: 68 IN | TEMPERATURE: 97.7 F | SYSTOLIC BLOOD PRESSURE: 123 MMHG | RESPIRATION RATE: 16 BRPM | WEIGHT: 186.8 LBS | OXYGEN SATURATION: 99 %

## 2022-03-17 LAB
ALBUMIN SERPL-MCNC: 4 G/DL (ref 3.5–5.2)
ALBUMIN/GLOB SERPL: 1.4 G/DL
ALP SERPL-CCNC: 70 U/L (ref 39–117)
ALT SERPL W P-5'-P-CCNC: 16 U/L (ref 1–33)
ANION GAP SERPL CALCULATED.3IONS-SCNC: 13.5 MMOL/L (ref 5–15)
AST SERPL-CCNC: 15 U/L (ref 1–32)
BASOPHILS # BLD AUTO: 0.04 10*3/MM3 (ref 0–0.2)
BASOPHILS NFR BLD AUTO: 0.3 % (ref 0–1.5)
BILIRUB SERPL-MCNC: <0.2 MG/DL (ref 0–1.2)
BILIRUB UR QL STRIP: NEGATIVE
BUN SERPL-MCNC: 6 MG/DL (ref 6–20)
BUN/CREAT SERPL: 11.5 (ref 7–25)
CALCIUM SPEC-SCNC: 9.5 MG/DL (ref 8.6–10.5)
CHLORIDE SERPL-SCNC: 106 MMOL/L (ref 98–107)
CLARITY UR: CLEAR
CO2 SERPL-SCNC: 17.5 MMOL/L (ref 22–29)
COLOR UR: YELLOW
CREAT SERPL-MCNC: 0.52 MG/DL (ref 0.57–1)
DEPRECATED RDW RBC AUTO: 41.9 FL (ref 37–54)
EGFRCR SERPLBLD CKD-EPI 2021: 134.9 ML/MIN/1.73
EOSINOPHIL # BLD AUTO: 0.14 10*3/MM3 (ref 0–0.4)
EOSINOPHIL NFR BLD AUTO: 1.1 % (ref 0.3–6.2)
ERYTHROCYTE [DISTWIDTH] IN BLOOD BY AUTOMATED COUNT: 13.5 % (ref 12.3–15.4)
GLOBULIN UR ELPH-MCNC: 2.9 GM/DL
GLUCOSE SERPL-MCNC: 90 MG/DL (ref 65–99)
GLUCOSE UR STRIP-MCNC: NEGATIVE MG/DL
HCT VFR BLD AUTO: 36.7 % (ref 34–46.6)
HGB BLD-MCNC: 12.6 G/DL (ref 12–15.9)
HGB UR QL STRIP.AUTO: NEGATIVE
IMM GRANULOCYTES # BLD AUTO: 0.06 10*3/MM3 (ref 0–0.05)
IMM GRANULOCYTES NFR BLD AUTO: 0.5 % (ref 0–0.5)
KETONES UR QL STRIP: NEGATIVE
LEUKOCYTE ESTERASE UR QL STRIP.AUTO: NEGATIVE
LYMPHOCYTES # BLD AUTO: 3.39 10*3/MM3 (ref 0.7–3.1)
LYMPHOCYTES NFR BLD AUTO: 26 % (ref 19.6–45.3)
MCH RBC QN AUTO: 28.8 PG (ref 26.6–33)
MCHC RBC AUTO-ENTMCNC: 34.3 G/DL (ref 31.5–35.7)
MCV RBC AUTO: 84 FL (ref 79–97)
MONOCYTES # BLD AUTO: 0.84 10*3/MM3 (ref 0.1–0.9)
MONOCYTES NFR BLD AUTO: 6.4 % (ref 5–12)
NEUTROPHILS NFR BLD AUTO: 65.7 % (ref 42.7–76)
NEUTROPHILS NFR BLD AUTO: 8.57 10*3/MM3 (ref 1.7–7)
NITRITE UR QL STRIP: NEGATIVE
NRBC BLD AUTO-RTO: 0 /100 WBC (ref 0–0.2)
PH UR STRIP.AUTO: 6 [PH] (ref 5–8)
PLATELET # BLD AUTO: 260 10*3/MM3 (ref 140–450)
PMV BLD AUTO: 11.4 FL (ref 6–12)
POTASSIUM SERPL-SCNC: 3.7 MMOL/L (ref 3.5–5.2)
PROT SERPL-MCNC: 6.9 G/DL (ref 6–8.5)
PROT UR QL STRIP: NEGATIVE
RBC # BLD AUTO: 4.37 10*6/MM3 (ref 3.77–5.28)
SODIUM SERPL-SCNC: 137 MMOL/L (ref 136–145)
SP GR UR STRIP: 1.02 (ref 1–1.03)
UROBILINOGEN UR QL STRIP: NORMAL
WBC NRBC COR # BLD: 13.04 10*3/MM3 (ref 3.4–10.8)

## 2022-03-17 PROCEDURE — 80053 COMPREHEN METABOLIC PANEL: CPT | Performed by: EMERGENCY MEDICINE

## 2022-03-17 PROCEDURE — 76817 TRANSVAGINAL US OBSTETRIC: CPT

## 2022-03-17 PROCEDURE — 81003 URINALYSIS AUTO W/O SCOPE: CPT | Performed by: EMERGENCY MEDICINE

## 2022-03-17 PROCEDURE — 85025 COMPLETE CBC W/AUTO DIFF WBC: CPT | Performed by: EMERGENCY MEDICINE

## 2022-03-17 NOTE — ED PROVIDER NOTES
TRIAGE CHIEF COMPLAINT:     Nursing and triage notes reviewed    Chief Complaint   Patient presents with   • Pregnancy Problem      HPI: Terrie Hogan is a 22 y.o. female who presents to the emergency department complaining of low back pain and concern for loss of mucous plug.  Patient is currently 14 weeks pregnant.  She states she passed a large amount of thick mucus earlier today.  She has had some aching low back pain.  She states that she has had similar issues in the past and lost a pregnancy because of this.  She contacted her OB physician who advised that she come be evaluated.  She denies any vaginal bleeding.  She denies dysuria.    REVIEW OF SYSTEMS: All other systems reviewed and are negative     PAST MEDICAL HISTORY:   Past Medical History:   Diagnosis Date   • Anxiety         FAMILY HISTORY:   Family History   Problem Relation Age of Onset   • Interstitial cystitis Mother         SOCIAL HISTORY:   Social History     Socioeconomic History   • Marital status:    Tobacco Use   • Smoking status: Never Smoker   • Smokeless tobacco: Never Used   Vaping Use   • Vaping Use: Never used   Substance and Sexual Activity   • Alcohol use: No   • Drug use: No   • Sexual activity: Yes     Partners: Male        SURGICAL HISTORY:   Past Surgical History:   Procedure Laterality Date   • ADENOIDECTOMY     •  SECTION N/A 2021    Procedure:  SECTION PRIMARY;  Surgeon: Lavelle Ortega III, MD;  Location: [facility] LABOR DELIVERY;  Service: Obstetrics/Gynecology;  Laterality: N/A;   • TONSILLECTOMY     • WISDOM TOOTH EXTRACTION          CURRENT MEDICATIONS:      Medication List      ASK your doctor about these medications    FeroSul 325 (65 FE) MG tablet  Generic drug: ferrous sulfate  Take 1 tablet by mouth 2 (Two) Times a Day Before Meals.     ibuprofen 600 MG tablet  Commonly known as: ADVIL,MOTRIN  Take 1 tablet by mouth Every 6 (Six) Hours.     lanolin cream  Apply  topically to the  appropriate area as directed Every 1 (One) Hour As Needed for Dry Skin (nipple pain).     Non-Aspirin Pain Reliever 325 MG tablet  Generic drug: acetaminophen  Take 2 tablets by mouth Every 6 (Six) Hours.     prenatal (CLASSIC) vitamin tablet  Generic drug: prenatal vitamin     Stool Softener Laxative 100 MG capsule  Generic drug: docusate sodium  Take 1 capsule by mouth 2 (Two) Times a Day As Needed for Constipation.             ALLERGIES: Penicillins     PHYSICAL EXAM:   VITAL SIGNS:   Vitals:    03/16/22 2103   BP: 123/74   Pulse: 102   Resp: 16   Temp: 97.7 °F (36.5 °C)   SpO2: 97%      CONSTITUTIONAL: Awake, oriented, appears nontoxic   HENT: Atraumatic, normocephalic, oral mucosa pink and moist, airway patent. Nares patent without drainage. External ears normal.   EYES: Conjunctivae clear  NECK: Trachea midline   CARDIOVASCULAR: Normal heart rate, Normal rhythm, No murmurs, rubs, gallops   PULMONARY/CHEST: Clear to auscultation, no rhonchi, wheezes, or rales. Symmetrical breath sounds.  ABDOMINAL: Nondistended, soft, nontender - no rebound or guarding.  NEUROLOGIC: Nonfocal, moving all four extremities, no gross sensory or motor deficits.   EXTREMITIES: No clubbing, cyanosis, or edema   SKIN: Warm, Dry, No erythema, No rash     ED COURSE / MEDICAL DECISION MAKING:   Terrie Hogan is a 22 y.o. female who presents to the emergency department for evaluation of low back pain in pregnancy.  Patient nondistressed on arrival in the emergency department.  Vital signs are stable on arrival.  Physical examination is largely unremarkable.  Will obtain an ultrasound and basic labs for further evaluation.    Laboratory tests reveal mild leukocytosis with a white blood cell count of 13.  Urinalysis is unremarkable.  Labs otherwise unremarkable.    Ultrasound per radiology interpretation reveals a single live intrauterine gestation with a heart rate of 157.  This measures 15 weeks 2 days.  The cervix appears closed on  the ultrasound and length of 4.3 cm.    Given this I think patient is safe for discharged home but does need to see her OB/GYN physician within the next few days.  Patient was comfortable with this plan and discharged in good condition.    DECISION TO DISCHARGE/ADMIT: see ED care timeline     FINAL IMPRESSION:   1 --threatened miscarriage  2 --   3 --     Electronically signed by: Becky Pardo MD, 3/16/2022 23:54 EDT       Becky Pardo MD  03/17/22 0212

## 2022-06-16 ENCOUNTER — TRANSCRIBE ORDERS (OUTPATIENT)
Dept: LAB | Facility: HOSPITAL | Age: 22
End: 2022-06-16

## 2022-06-16 ENCOUNTER — LAB (OUTPATIENT)
Dept: LAB | Facility: HOSPITAL | Age: 22
End: 2022-06-16

## 2022-06-16 DIAGNOSIS — Z3A.28 28 WEEKS GESTATION OF PREGNANCY: Primary | ICD-10-CM

## 2022-06-16 LAB
DEPRECATED RDW RBC AUTO: 38.3 FL (ref 37–54)
ERYTHROCYTE [DISTWIDTH] IN BLOOD BY AUTOMATED COUNT: 13.3 % (ref 12.3–15.4)
GLUCOSE 1H P 100 G GLC PO SERPL-MCNC: 93 MG/DL (ref 65–139)
HCT VFR BLD AUTO: 37 % (ref 34–46.6)
HGB BLD-MCNC: 12.5 G/DL (ref 12–15.9)
MCH RBC QN AUTO: 27.7 PG (ref 26.6–33)
MCHC RBC AUTO-ENTMCNC: 33.8 G/DL (ref 31.5–35.7)
MCV RBC AUTO: 81.9 FL (ref 79–97)
PLATELET # BLD AUTO: 224 10*3/MM3 (ref 140–450)
PMV BLD AUTO: 11.6 FL (ref 6–12)
RBC # BLD AUTO: 4.52 10*6/MM3 (ref 3.77–5.28)
WBC NRBC COR # BLD: 14.97 10*3/MM3 (ref 3.4–10.8)

## 2022-06-16 PROCEDURE — 82962 GLUCOSE BLOOD TEST: CPT | Performed by: ADVANCED PRACTICE MIDWIFE

## 2022-06-16 PROCEDURE — 82950 GLUCOSE TEST: CPT | Performed by: ADVANCED PRACTICE MIDWIFE

## 2022-06-16 PROCEDURE — 85027 COMPLETE CBC AUTOMATED: CPT | Performed by: ADVANCED PRACTICE MIDWIFE

## 2022-06-16 PROCEDURE — 36415 COLL VENOUS BLD VENIPUNCTURE: CPT | Performed by: ADVANCED PRACTICE MIDWIFE

## 2022-07-30 ENCOUNTER — HOSPITAL ENCOUNTER (OUTPATIENT)
Facility: HOSPITAL | Age: 22
Setting detail: OBSERVATION
Discharge: HOME | End: 2022-07-30
Attending: ADVANCED PRACTICE MIDWIFE | Admitting: OBSTETRICS & GYNECOLOGY

## 2022-07-30 VITALS
HEIGHT: 68 IN | BODY MASS INDEX: 31.83 KG/M2 | RESPIRATION RATE: 18 BRPM | WEIGHT: 210 LBS | SYSTOLIC BLOOD PRESSURE: 123 MMHG | OXYGEN SATURATION: 98 % | TEMPERATURE: 98.4 F | HEART RATE: 88 BPM | DIASTOLIC BLOOD PRESSURE: 75 MMHG

## 2022-07-30 PROBLEM — O36.8190 DECREASED FETAL MOVEMENT: Status: ACTIVE | Noted: 2022-07-30

## 2022-07-30 PROCEDURE — 59025 FETAL NON-STRESS TEST: CPT

## 2022-07-30 PROCEDURE — 59025 FETAL NON-STRESS TEST: CPT | Performed by: OBSTETRICS & GYNECOLOGY

## 2022-07-30 PROCEDURE — 99218 PR INITIAL OBSERVATION CARE/DAY 30 MINUTES: CPT | Performed by: OBSTETRICS & GYNECOLOGY

## 2022-07-30 PROCEDURE — G0378 HOSPITAL OBSERVATION PER HR: HCPCS

## 2022-07-30 RX ORDER — SERTRALINE HYDROCHLORIDE 25 MG/1
25 TABLET, FILM COATED ORAL DAILY
COMMUNITY

## 2022-07-31 ENCOUNTER — ANESTHESIA (OUTPATIENT)
Dept: LABOR AND DELIVERY | Facility: HOSPITAL | Age: 22
End: 2022-07-31

## 2022-07-31 ENCOUNTER — ANESTHESIA EVENT (OUTPATIENT)
Dept: LABOR AND DELIVERY | Facility: HOSPITAL | Age: 22
End: 2022-07-31

## 2022-07-31 ENCOUNTER — HOSPITAL ENCOUNTER (INPATIENT)
Facility: HOSPITAL | Age: 22
LOS: 4 days | Discharge: HOME | End: 2022-08-04
Attending: ADVANCED PRACTICE MIDWIFE | Admitting: OBSTETRICS & GYNECOLOGY

## 2022-07-31 DIAGNOSIS — O34.219 DELIVERED BY CESAREAN DELIVERY FOLLOWING PREVIOUS CESAREAN DELIVERY: Primary | ICD-10-CM

## 2022-07-31 PROBLEM — O36.8190 DECREASED FETAL MOVEMENT: Status: RESOLVED | Noted: 2022-07-30 | Resolved: 2022-07-31

## 2022-07-31 PROBLEM — Z37.9 NORMAL LABOR: Status: ACTIVE | Noted: 2022-07-31

## 2022-07-31 PROBLEM — Z37.9 NORMAL LABOR: Status: RESOLVED | Noted: 2022-07-31 | Resolved: 2022-07-31

## 2022-07-31 LAB
ABO GROUP BLD: NORMAL
ALP SERPL-CCNC: 166 U/L (ref 39–117)
ALT SERPL W P-5'-P-CCNC: 14 U/L (ref 1–33)
AST SERPL-CCNC: 23 U/L (ref 1–32)
ATMOSPHERIC PRESS: ABNORMAL MM[HG]
ATMOSPHERIC PRESS: ABNORMAL MM[HG]
BASE EXCESS BLDCOA CALC-SCNC: -3.2 MMOL/L (ref 0–2)
BASE EXCESS BLDCOV CALC-SCNC: -2.2 MMOL/L (ref 0–2)
BDY SITE: ABNORMAL
BDY SITE: ABNORMAL
BILIRUB SERPL-MCNC: 0.3 MG/DL (ref 0–1.2)
BLD GP AB SCN SERPL QL: NEGATIVE
BODY TEMPERATURE: 37 C
BODY TEMPERATURE: 37 C
CO2 BLDA-SCNC: 24.3 MMOL/L (ref 22–33)
CO2 BLDA-SCNC: 25.7 MMOL/L (ref 22–33)
COLLECT TME SMN: ABNORMAL
CREAT SERPL-MCNC: 0.38 MG/DL (ref 0.57–1)
DEPRECATED RDW RBC AUTO: 40.4 FL (ref 37–54)
EPAP: 0
EPAP: 0
ERYTHROCYTE [DISTWIDTH] IN BLOOD BY AUTOMATED COUNT: 13.8 % (ref 12.3–15.4)
FIBRINOGEN PPP-MCNC: 479 MG/DL (ref 220–470)
HCO3 BLDCOA-SCNC: 24.1 MMOL/L (ref 16.9–20.5)
HCO3 BLDCOV-SCNC: 23 MMOL/L (ref 18.6–21.4)
HCT VFR BLD AUTO: 35.7 % (ref 34–46.6)
HGB BLD-MCNC: 11.8 G/DL (ref 12–15.9)
HGB BLDA-MCNC: 12.6 G/DL (ref 14–18)
HGB BLDA-MCNC: 12.7 G/DL (ref 14–18)
INHALED O2 CONCENTRATION: 21 %
INHALED O2 CONCENTRATION: 21 %
IPAP: 0
IPAP: 0
LDH SERPL-CCNC: 211 U/L (ref 135–214)
MCH RBC QN AUTO: 26.8 PG (ref 26.6–33)
MCHC RBC AUTO-ENTMCNC: 33.1 G/DL (ref 31.5–35.7)
MCV RBC AUTO: 81.1 FL (ref 79–97)
MODALITY: ABNORMAL
MODALITY: ABNORMAL
NOTE: ABNORMAL
NOTE: ABNORMAL
PAW @ PEAK INSP FLOW SETTING VENT: 0 CMH2O
PAW @ PEAK INSP FLOW SETTING VENT: 0 CMH2O
PCO2 BLDCOA: 51.8 MMHG (ref 43.3–54.9)
PCO2 BLDCOV: 40.5 MM HG (ref 28–40)
PH BLDCOA: 7.28 PH UNITS (ref 7.22–7.3)
PH BLDCOV: 7.36 PH UNITS (ref 7.31–7.37)
PLATELET # BLD AUTO: 234 10*3/MM3 (ref 140–450)
PMV BLD AUTO: 11.4 FL (ref 6–12)
PO2 BLDCOA: 10.1 MMHG (ref 11.5–43.3)
PO2 BLDCOV: 21.3 MM HG (ref 21–31)
RBC # BLD AUTO: 4.4 10*6/MM3 (ref 3.77–5.28)
RH BLD: POSITIVE
SAO2 % BLDCOA: 13.5 %
SAO2 % BLDCOA: ABNORMAL %
SAO2 % BLDCOV: 49.7 %
SARS-COV-2 RDRP RESP QL NAA+PROBE: NORMAL
T&S EXPIRATION DATE: NORMAL
TOTAL RATE: 0 BREATHS/MINUTE
TOTAL RATE: 0 BREATHS/MINUTE
URATE SERPL-MCNC: 3.4 MG/DL (ref 2.4–5.7)
VENTILATOR MODE: ABNORMAL
WBC NRBC COR # BLD: 18.75 10*3/MM3 (ref 3.4–10.8)

## 2022-07-31 PROCEDURE — 87635 SARS-COV-2 COVID-19 AMP PRB: CPT | Performed by: OBSTETRICS & GYNECOLOGY

## 2022-07-31 PROCEDURE — 84460 ALANINE AMINO (ALT) (SGPT): CPT | Performed by: OBSTETRICS & GYNECOLOGY

## 2022-07-31 PROCEDURE — 86850 RBC ANTIBODY SCREEN: CPT | Performed by: OBSTETRICS & GYNECOLOGY

## 2022-07-31 PROCEDURE — 84550 ASSAY OF BLOOD/URIC ACID: CPT | Performed by: OBSTETRICS & GYNECOLOGY

## 2022-07-31 PROCEDURE — 84075 ASSAY ALKALINE PHOSPHATASE: CPT | Performed by: OBSTETRICS & GYNECOLOGY

## 2022-07-31 PROCEDURE — 25010000002 KETOROLAC TROMETHAMINE PER 15 MG: Performed by: OBSTETRICS & GYNECOLOGY

## 2022-07-31 PROCEDURE — 25010000002 ONDANSETRON PER 1 MG: Performed by: OBSTETRICS & GYNECOLOGY

## 2022-07-31 PROCEDURE — 51703 INSERT BLADDER CATH COMPLEX: CPT

## 2022-07-31 PROCEDURE — 84450 TRANSFERASE (AST) (SGOT): CPT | Performed by: OBSTETRICS & GYNECOLOGY

## 2022-07-31 PROCEDURE — 82247 BILIRUBIN TOTAL: CPT | Performed by: OBSTETRICS & GYNECOLOGY

## 2022-07-31 PROCEDURE — 82805 BLOOD GASES W/O2 SATURATION: CPT

## 2022-07-31 PROCEDURE — 85027 COMPLETE CBC AUTOMATED: CPT | Performed by: OBSTETRICS & GYNECOLOGY

## 2022-07-31 PROCEDURE — 0 MORPHINE PER 10 MG: Performed by: ANESTHESIOLOGY

## 2022-07-31 PROCEDURE — C1755 CATHETER, INTRASPINAL: HCPCS | Performed by: ANESTHESIOLOGY

## 2022-07-31 PROCEDURE — 25010000002 CEFAZOLIN PER 500 MG: Performed by: OBSTETRICS & GYNECOLOGY

## 2022-07-31 PROCEDURE — 25010000002 ONDANSETRON PER 1 MG: Performed by: ANESTHESIOLOGY

## 2022-07-31 PROCEDURE — C1755 CATHETER, INTRASPINAL: HCPCS

## 2022-07-31 PROCEDURE — 86900 BLOOD TYPING SEROLOGIC ABO: CPT | Performed by: OBSTETRICS & GYNECOLOGY

## 2022-07-31 PROCEDURE — 25010000002 METOCLOPRAMIDE PER 10 MG: Performed by: ANESTHESIOLOGY

## 2022-07-31 PROCEDURE — 25010000002 ROPIVACAINE PER 1 MG: Performed by: ANESTHESIOLOGY

## 2022-07-31 PROCEDURE — 59514 CESAREAN DELIVERY ONLY: CPT | Performed by: OBSTETRICS & GYNECOLOGY

## 2022-07-31 PROCEDURE — 86901 BLOOD TYPING SEROLOGIC RH(D): CPT | Performed by: OBSTETRICS & GYNECOLOGY

## 2022-07-31 PROCEDURE — 82565 ASSAY OF CREATININE: CPT | Performed by: OBSTETRICS & GYNECOLOGY

## 2022-07-31 PROCEDURE — 85384 FIBRINOGEN ACTIVITY: CPT | Performed by: OBSTETRICS & GYNECOLOGY

## 2022-07-31 PROCEDURE — 25010000002 FENTANYL CITRATE (PF) 50 MCG/ML SOLUTION: Performed by: ANESTHESIOLOGY

## 2022-07-31 PROCEDURE — 25010000002 MIDAZOLAM PER 1 MG: Performed by: ANESTHESIOLOGY

## 2022-07-31 PROCEDURE — 83615 LACTATE (LD) (LDH) ENZYME: CPT | Performed by: OBSTETRICS & GYNECOLOGY

## 2022-07-31 RX ORDER — MAGNESIUM CARB/ALUMINUM HYDROX 105-160MG
30 TABLET,CHEWABLE ORAL ONCE
Status: DISCONTINUED | OUTPATIENT
Start: 2022-07-31 | End: 2022-07-31 | Stop reason: HOSPADM

## 2022-07-31 RX ORDER — EPHEDRINE SULFATE 5 MG/ML
INJECTION INTRAVENOUS
Status: DISCONTINUED
Start: 2022-07-31 | End: 2022-08-04 | Stop reason: HOSPADM

## 2022-07-31 RX ORDER — METOCLOPRAMIDE HYDROCHLORIDE 5 MG/ML
10 INJECTION INTRAMUSCULAR; INTRAVENOUS ONCE AS NEEDED
Status: DISCONTINUED | OUTPATIENT
Start: 2022-07-31 | End: 2022-07-31 | Stop reason: HOSPADM

## 2022-07-31 RX ORDER — ONDANSETRON 2 MG/ML
INJECTION INTRAMUSCULAR; INTRAVENOUS AS NEEDED
Status: DISCONTINUED | OUTPATIENT
Start: 2022-07-31 | End: 2022-07-31 | Stop reason: SURG

## 2022-07-31 RX ORDER — SODIUM CHLORIDE, SODIUM LACTATE, POTASSIUM CHLORIDE, CALCIUM CHLORIDE 600; 310; 30; 20 MG/100ML; MG/100ML; MG/100ML; MG/100ML
INJECTION, SOLUTION INTRAVENOUS CONTINUOUS PRN
Status: DISCONTINUED | OUTPATIENT
Start: 2022-07-31 | End: 2022-07-31 | Stop reason: SURG

## 2022-07-31 RX ORDER — TRISODIUM CITRATE DIHYDRATE AND CITRIC ACID MONOHYDRATE 500; 334 MG/5ML; MG/5ML
30 SOLUTION ORAL ONCE
Status: COMPLETED | OUTPATIENT
Start: 2022-07-31 | End: 2022-07-31

## 2022-07-31 RX ORDER — METHYLERGONOVINE MALEATE 0.2 MG/ML
200 INJECTION INTRAVENOUS AS NEEDED
Status: DISCONTINUED | OUTPATIENT
Start: 2022-07-31 | End: 2022-07-31 | Stop reason: SDUPTHER

## 2022-07-31 RX ORDER — IBUPROFEN 600 MG/1
600 TABLET ORAL EVERY 6 HOURS
Status: DISCONTINUED | OUTPATIENT
Start: 2022-08-02 | End: 2022-08-04 | Stop reason: HOSPADM

## 2022-07-31 RX ORDER — METHYLERGONOVINE MALEATE 0.2 MG/ML
200 INJECTION INTRAVENOUS ONCE AS NEEDED
Status: DISCONTINUED | OUTPATIENT
Start: 2022-07-31 | End: 2022-07-31 | Stop reason: HOSPADM

## 2022-07-31 RX ORDER — ONDANSETRON 2 MG/ML
4 INJECTION INTRAMUSCULAR; INTRAVENOUS EVERY 6 HOURS PRN
Status: DISCONTINUED | OUTPATIENT
Start: 2022-07-31 | End: 2022-07-31 | Stop reason: SDUPTHER

## 2022-07-31 RX ORDER — HYDROCORTISONE 25 MG/G
1 CREAM TOPICAL AS NEEDED
Status: DISCONTINUED | OUTPATIENT
Start: 2022-07-31 | End: 2022-08-04 | Stop reason: HOSPADM

## 2022-07-31 RX ORDER — MORPHINE SULFATE 0.5 MG/ML
INJECTION, SOLUTION EPIDURAL; INTRATHECAL; INTRAVENOUS AS NEEDED
Status: DISCONTINUED | OUTPATIENT
Start: 2022-07-31 | End: 2022-07-31 | Stop reason: SURG

## 2022-07-31 RX ORDER — LIDOCAINE HYDROCHLORIDE 10 MG/ML
5 INJECTION, SOLUTION EPIDURAL; INFILTRATION; INTRACAUDAL; PERINEURAL AS NEEDED
Status: DISCONTINUED | OUTPATIENT
Start: 2022-07-31 | End: 2022-07-31 | Stop reason: HOSPADM

## 2022-07-31 RX ORDER — ACETAMINOPHEN 500 MG
1000 TABLET ORAL EVERY 6 HOURS
Status: COMPLETED | OUTPATIENT
Start: 2022-07-31 | End: 2022-08-01

## 2022-07-31 RX ORDER — ONDANSETRON 2 MG/ML
4 INJECTION INTRAMUSCULAR; INTRAVENOUS EVERY 6 HOURS PRN
Status: DISCONTINUED | OUTPATIENT
Start: 2022-07-31 | End: 2022-07-31 | Stop reason: HOSPADM

## 2022-07-31 RX ORDER — ALUMINA, MAGNESIA, AND SIMETHICONE 2400; 2400; 240 MG/30ML; MG/30ML; MG/30ML
15 SUSPENSION ORAL EVERY 4 HOURS PRN
Status: DISCONTINUED | OUTPATIENT
Start: 2022-07-31 | End: 2022-07-31 | Stop reason: SDUPTHER

## 2022-07-31 RX ORDER — FAMOTIDINE 10 MG/ML
INJECTION, SOLUTION INTRAVENOUS AS NEEDED
Status: DISCONTINUED | OUTPATIENT
Start: 2022-07-31 | End: 2022-07-31 | Stop reason: SURG

## 2022-07-31 RX ORDER — DIPHENHYDRAMINE HYDROCHLORIDE 50 MG/ML
12.5 INJECTION INTRAMUSCULAR; INTRAVENOUS EVERY 8 HOURS PRN
Status: DISCONTINUED | OUTPATIENT
Start: 2022-07-31 | End: 2022-07-31 | Stop reason: HOSPADM

## 2022-07-31 RX ORDER — DOCUSATE SODIUM 100 MG/1
100 CAPSULE, LIQUID FILLED ORAL 2 TIMES DAILY
Status: DISCONTINUED | OUTPATIENT
Start: 2022-07-31 | End: 2022-08-04 | Stop reason: HOSPADM

## 2022-07-31 RX ORDER — CARBOPROST TROMETHAMINE 250 UG/ML
250 INJECTION, SOLUTION INTRAMUSCULAR
Status: DISCONTINUED | OUTPATIENT
Start: 2022-07-31 | End: 2022-07-31

## 2022-07-31 RX ORDER — ALUMINA, MAGNESIA, AND SIMETHICONE 2400; 2400; 240 MG/30ML; MG/30ML; MG/30ML
15 SUSPENSION ORAL EVERY 4 HOURS PRN
Status: DISCONTINUED | OUTPATIENT
Start: 2022-07-31 | End: 2022-08-04 | Stop reason: HOSPADM

## 2022-07-31 RX ORDER — SIMETHICONE 80 MG
80 TABLET,CHEWABLE ORAL 4 TIMES DAILY PRN
Status: DISCONTINUED | OUTPATIENT
Start: 2022-07-31 | End: 2022-08-04 | Stop reason: HOSPADM

## 2022-07-31 RX ORDER — METOCLOPRAMIDE HYDROCHLORIDE 5 MG/ML
INJECTION INTRAMUSCULAR; INTRAVENOUS AS NEEDED
Status: DISCONTINUED | OUTPATIENT
Start: 2022-07-31 | End: 2022-07-31 | Stop reason: SURG

## 2022-07-31 RX ORDER — OXYTOCIN/0.9 % SODIUM CHLORIDE 30/500 ML
PLASTIC BAG, INJECTION (ML) INTRAVENOUS
Status: DISCONTINUED
Start: 2022-07-31 | End: 2022-08-04 | Stop reason: HOSPADM

## 2022-07-31 RX ORDER — FENTANYL CITRATE 50 UG/ML
INJECTION, SOLUTION INTRAMUSCULAR; INTRAVENOUS AS NEEDED
Status: DISCONTINUED | OUTPATIENT
Start: 2022-07-31 | End: 2022-07-31 | Stop reason: SURG

## 2022-07-31 RX ORDER — MISOPROSTOL 200 UG/1
800 TABLET ORAL ONCE AS NEEDED
Status: DISCONTINUED | OUTPATIENT
Start: 2022-07-31 | End: 2022-07-31 | Stop reason: SDUPTHER

## 2022-07-31 RX ORDER — BUPIVACAINE HCL/0.9 % NACL/PF 0.1 %
PLASTIC BAG, INJECTION (ML) EPIDURAL
Status: DISCONTINUED
Start: 2022-07-31 | End: 2022-08-04 | Stop reason: HOSPADM

## 2022-07-31 RX ORDER — MISOPROSTOL 200 UG/1
600 TABLET ORAL AS NEEDED
Status: DISCONTINUED | OUTPATIENT
Start: 2022-07-31 | End: 2022-08-04 | Stop reason: HOSPADM

## 2022-07-31 RX ORDER — EPHEDRINE SULFATE 5 MG/ML
10 INJECTION INTRAVENOUS
Status: DISCONTINUED | OUTPATIENT
Start: 2022-07-31 | End: 2022-07-31 | Stop reason: HOSPADM

## 2022-07-31 RX ORDER — ERYTHROMYCIN 5 MG/G
OINTMENT OPHTHALMIC
Status: DISCONTINUED
Start: 2022-07-31 | End: 2022-08-04 | Stop reason: HOSPADM

## 2022-07-31 RX ORDER — FAMOTIDINE 10 MG/ML
20 INJECTION, SOLUTION INTRAVENOUS ONCE AS NEEDED
Status: DISCONTINUED | OUTPATIENT
Start: 2022-07-31 | End: 2022-07-31 | Stop reason: HOSPADM

## 2022-07-31 RX ORDER — NALBUPHINE HCL 10 MG/ML
3 AMPUL (ML) INJECTION EVERY 4 HOURS PRN
Status: ACTIVE | OUTPATIENT
Start: 2022-07-31 | End: 2022-08-01

## 2022-07-31 RX ORDER — KETOROLAC TROMETHAMINE 30 MG/ML
30 INJECTION, SOLUTION INTRAMUSCULAR; INTRAVENOUS ONCE
Status: COMPLETED | OUTPATIENT
Start: 2022-07-31 | End: 2022-07-31

## 2022-07-31 RX ORDER — OXYTOCIN/0.9 % SODIUM CHLORIDE 30/500 ML
85 PLASTIC BAG, INJECTION (ML) INTRAVENOUS ONCE
Status: DISCONTINUED | OUTPATIENT
Start: 2022-07-31 | End: 2022-07-31 | Stop reason: HOSPADM

## 2022-07-31 RX ORDER — LIDOCAINE HYDROCHLORIDE AND EPINEPHRINE 20; 5 MG/ML; UG/ML
INJECTION, SOLUTION EPIDURAL; INFILTRATION; INTRACAUDAL; PERINEURAL AS NEEDED
Status: DISCONTINUED | OUTPATIENT
Start: 2022-07-31 | End: 2022-07-31 | Stop reason: SURG

## 2022-07-31 RX ORDER — MIDAZOLAM HYDROCHLORIDE 1 MG/ML
INJECTION INTRAMUSCULAR; INTRAVENOUS AS NEEDED
Status: DISCONTINUED | OUTPATIENT
Start: 2022-07-31 | End: 2022-07-31 | Stop reason: SURG

## 2022-07-31 RX ORDER — CALCIUM CARBONATE 200(500)MG
1 TABLET,CHEWABLE ORAL EVERY 4 HOURS PRN
Status: DISCONTINUED | OUTPATIENT
Start: 2022-07-31 | End: 2022-08-04 | Stop reason: HOSPADM

## 2022-07-31 RX ORDER — SODIUM CHLORIDE 0.9 % (FLUSH) 0.9 %
10 SYRINGE (ML) INJECTION AS NEEDED
Status: DISCONTINUED | OUTPATIENT
Start: 2022-07-31 | End: 2022-07-31 | Stop reason: HOSPADM

## 2022-07-31 RX ORDER — OXYCODONE HYDROCHLORIDE 5 MG/1
10 TABLET ORAL EVERY 4 HOURS PRN
Status: DISCONTINUED | OUTPATIENT
Start: 2022-07-31 | End: 2022-08-04 | Stop reason: HOSPADM

## 2022-07-31 RX ORDER — CEFAZOLIN SODIUM 1 G/50ML
1 INJECTION, SOLUTION INTRAVENOUS EVERY 8 HOURS
Status: DISCONTINUED | OUTPATIENT
Start: 2022-07-31 | End: 2022-07-31 | Stop reason: HOSPADM

## 2022-07-31 RX ORDER — OXYTOCIN/0.9 % SODIUM CHLORIDE 30/500 ML
650 PLASTIC BAG, INJECTION (ML) INTRAVENOUS ONCE
Status: DISCONTINUED | OUTPATIENT
Start: 2022-07-31 | End: 2022-07-31 | Stop reason: HOSPADM

## 2022-07-31 RX ORDER — LIDOCAINE HYDROCHLORIDE AND EPINEPHRINE 15; 5 MG/ML; UG/ML
INJECTION, SOLUTION EPIDURAL AS NEEDED
Status: DISCONTINUED | OUTPATIENT
Start: 2022-07-31 | End: 2022-07-31 | Stop reason: SURG

## 2022-07-31 RX ORDER — ONDANSETRON 4 MG/1
4 TABLET, FILM COATED ORAL EVERY 6 HOURS PRN
Status: DISCONTINUED | OUTPATIENT
Start: 2022-07-31 | End: 2022-07-31 | Stop reason: HOSPADM

## 2022-07-31 RX ORDER — BUPIVACAINE HCL/0.9 % NACL/PF 0.1 %
2 PLASTIC BAG, INJECTION (ML) EPIDURAL ONCE
Status: COMPLETED | OUTPATIENT
Start: 2022-07-31 | End: 2022-07-31

## 2022-07-31 RX ORDER — SODIUM CHLORIDE 0.9 % (FLUSH) 0.9 %
10 SYRINGE (ML) INJECTION EVERY 12 HOURS SCHEDULED
Status: DISCONTINUED | OUTPATIENT
Start: 2022-07-31 | End: 2022-07-31 | Stop reason: HOSPADM

## 2022-07-31 RX ORDER — BUPIVACAINE HCL/0.9 % NACL/PF 0.125 %
PLASTIC BAG, INJECTION (ML) EPIDURAL AS NEEDED
Status: DISCONTINUED | OUTPATIENT
Start: 2022-07-31 | End: 2022-07-31 | Stop reason: SURG

## 2022-07-31 RX ORDER — SODIUM CHLORIDE, SODIUM LACTATE, POTASSIUM CHLORIDE, CALCIUM CHLORIDE 600; 310; 30; 20 MG/100ML; MG/100ML; MG/100ML; MG/100ML
125 INJECTION, SOLUTION INTRAVENOUS CONTINUOUS
Status: DISCONTINUED | OUTPATIENT
Start: 2022-07-31 | End: 2022-08-02

## 2022-07-31 RX ORDER — OXYTOCIN 10 [USP'U]/ML
INJECTION, SOLUTION INTRAMUSCULAR; INTRAVENOUS AS NEEDED
Status: DISCONTINUED | OUTPATIENT
Start: 2022-07-31 | End: 2022-07-31 | Stop reason: SURG

## 2022-07-31 RX ORDER — ROPIVACAINE HYDROCHLORIDE 2 MG/ML
15 INJECTION, SOLUTION EPIDURAL; INFILTRATION; PERINEURAL CONTINUOUS
Status: DISCONTINUED | OUTPATIENT
Start: 2022-07-31 | End: 2022-08-02

## 2022-07-31 RX ORDER — CARBOPROST TROMETHAMINE 250 UG/ML
250 INJECTION, SOLUTION INTRAMUSCULAR AS NEEDED
Status: DISCONTINUED | OUTPATIENT
Start: 2022-07-31 | End: 2022-08-04 | Stop reason: HOSPADM

## 2022-07-31 RX ORDER — OXYTOCIN/0.9 % SODIUM CHLORIDE 30/500 ML
PLASTIC BAG, INJECTION (ML) INTRAVENOUS AS NEEDED
Status: DISCONTINUED | OUTPATIENT
Start: 2022-07-31 | End: 2022-07-31 | Stop reason: SURG

## 2022-07-31 RX ORDER — PRENATAL VIT/IRON FUM/FOLIC AC 27MG-0.8MG
1 TABLET ORAL DAILY
Status: DISCONTINUED | OUTPATIENT
Start: 2022-07-31 | End: 2022-08-04 | Stop reason: HOSPADM

## 2022-07-31 RX ORDER — BUPIVACAINE HYDROCHLORIDE 2.5 MG/ML
INJECTION, SOLUTION EPIDURAL; INFILTRATION; INTRACAUDAL AS NEEDED
Status: DISCONTINUED | OUTPATIENT
Start: 2022-07-31 | End: 2022-07-31 | Stop reason: SURG

## 2022-07-31 RX ORDER — KETOROLAC TROMETHAMINE 15 MG/ML
15 INJECTION, SOLUTION INTRAMUSCULAR; INTRAVENOUS EVERY 6 HOURS
Status: COMPLETED | OUTPATIENT
Start: 2022-08-01 | End: 2022-08-01

## 2022-07-31 RX ORDER — HYDROMORPHONE HYDROCHLORIDE 1 MG/ML
0.5 INJECTION, SOLUTION INTRAMUSCULAR; INTRAVENOUS; SUBCUTANEOUS
Status: ACTIVE | OUTPATIENT
Start: 2022-07-31 | End: 2022-08-01

## 2022-07-31 RX ORDER — ONDANSETRON 2 MG/ML
4 INJECTION INTRAMUSCULAR; INTRAVENOUS ONCE AS NEEDED
Status: DISCONTINUED | OUTPATIENT
Start: 2022-07-31 | End: 2022-07-31 | Stop reason: HOSPADM

## 2022-07-31 RX ORDER — ACETAMINOPHEN 325 MG/1
650 TABLET ORAL EVERY 6 HOURS
Status: DISCONTINUED | OUTPATIENT
Start: 2022-08-01 | End: 2022-08-04 | Stop reason: HOSPADM

## 2022-07-31 RX ORDER — OXYCODONE HYDROCHLORIDE 5 MG/1
5 TABLET ORAL EVERY 4 HOURS PRN
Status: DISCONTINUED | OUTPATIENT
Start: 2022-07-31 | End: 2022-08-04 | Stop reason: HOSPADM

## 2022-07-31 RX ORDER — ONDANSETRON 4 MG/1
4 TABLET, FILM COATED ORAL EVERY 6 HOURS PRN
Status: DISCONTINUED | OUTPATIENT
Start: 2022-07-31 | End: 2022-07-31 | Stop reason: SDUPTHER

## 2022-07-31 RX ORDER — SERTRALINE HYDROCHLORIDE 25 MG/1
25 TABLET, FILM COATED ORAL DAILY
Status: DISCONTINUED | OUTPATIENT
Start: 2022-07-31 | End: 2022-08-04 | Stop reason: HOSPADM

## 2022-07-31 RX ADMIN — MIDAZOLAM 1 MG: 1 INJECTION INTRAMUSCULAR; INTRAVENOUS at 19:00

## 2022-07-31 RX ADMIN — LIDOCAINE HYDROCHLORIDE AND EPINEPHRINE 2 ML: 15; 5 INJECTION, SOLUTION EPIDURAL at 15:02

## 2022-07-31 RX ADMIN — BUPIVACAINE HYDROCHLORIDE 12 ML: 2.5 INJECTION, SOLUTION EPIDURAL; INFILTRATION; INTRACAUDAL; PERINEURAL at 15:03

## 2022-07-31 RX ADMIN — LIDOCAINE HYDROCHLORIDE AND EPINEPHRINE 15 ML: 20; 5 INJECTION, SOLUTION EPIDURAL; INFILTRATION; INTRACAUDAL; PERINEURAL at 18:26

## 2022-07-31 RX ADMIN — ACETAMINOPHEN 1000 MG: 500 TABLET, FILM COATED ORAL at 22:42

## 2022-07-31 RX ADMIN — MORPHINE SULFATE 4 MG: 0.5 INJECTION, SOLUTION EPIDURAL; INTRATHECAL; INTRAVENOUS at 18:49

## 2022-07-31 RX ADMIN — METOCLOPRAMIDE 10 MG: 5 INJECTION, SOLUTION INTRAMUSCULAR; INTRAVENOUS at 18:35

## 2022-07-31 RX ADMIN — SERTRALINE HYDROCHLORIDE 25 MG: 25 TABLET ORAL at 22:42

## 2022-07-31 RX ADMIN — MIDAZOLAM 1 MG: 1 INJECTION INTRAMUSCULAR; INTRAVENOUS at 18:47

## 2022-07-31 RX ADMIN — ROPIVACAINE HYDROCHLORIDE 15 ML/HR: 2 INJECTION, SOLUTION EPIDURAL; INFILTRATION at 15:09

## 2022-07-31 RX ADMIN — SODIUM BICARBONATE 1.5 ML: 84 INJECTION, SOLUTION INTRAVENOUS at 18:26

## 2022-07-31 RX ADMIN — SODIUM CHLORIDE, POTASSIUM CHLORIDE, SODIUM LACTATE AND CALCIUM CHLORIDE 125 ML/HR: 600; 310; 30; 20 INJECTION, SOLUTION INTRAVENOUS at 15:13

## 2022-07-31 RX ADMIN — ONDANSETRON 4 MG: 2 INJECTION INTRAMUSCULAR; INTRAVENOUS at 18:35

## 2022-07-31 RX ADMIN — SODIUM CHLORIDE, POTASSIUM CHLORIDE, SODIUM LACTATE AND CALCIUM CHLORIDE 1000 ML: 600; 310; 30; 20 INJECTION, SOLUTION INTRAVENOUS at 14:24

## 2022-07-31 RX ADMIN — SODIUM CHLORIDE, POTASSIUM CHLORIDE, SODIUM LACTATE AND CALCIUM CHLORIDE 125 ML/HR: 600; 310; 30; 20 INJECTION, SOLUTION INTRAVENOUS at 22:42

## 2022-07-31 RX ADMIN — MORPHINE SULFATE 1 MG: 0.5 INJECTION, SOLUTION EPIDURAL; INTRATHECAL; INTRAVENOUS at 18:57

## 2022-07-31 RX ADMIN — Medication 500 ML: at 18:47

## 2022-07-31 RX ADMIN — ONDANSETRON 4 MG: 2 INJECTION INTRAMUSCULAR; INTRAVENOUS at 17:59

## 2022-07-31 RX ADMIN — FAMOTIDINE 20 MG: 10 INJECTION, SOLUTION INTRAVENOUS at 18:35

## 2022-07-31 RX ADMIN — OXYTOCIN 3 UNITS: 10 INJECTION, SOLUTION INTRAMUSCULAR; INTRAVENOUS at 18:51

## 2022-07-31 RX ADMIN — KETOROLAC TROMETHAMINE 30 MG: 30 INJECTION, SOLUTION INTRAMUSCULAR; INTRAVENOUS at 19:58

## 2022-07-31 RX ADMIN — LIDOCAINE HYDROCHLORIDE AND EPINEPHRINE 3 ML: 15; 5 INJECTION, SOLUTION EPIDURAL at 15:01

## 2022-07-31 RX ADMIN — SODIUM CITRATE AND CITRIC ACID MONOHYDRATE 30 ML: 500; 334 SOLUTION ORAL at 18:29

## 2022-07-31 RX ADMIN — Medication 200 MCG: at 19:16

## 2022-07-31 RX ADMIN — OXYTOCIN 3 UNITS: 10 INJECTION, SOLUTION INTRAMUSCULAR; INTRAVENOUS at 18:48

## 2022-07-31 RX ADMIN — OXYTOCIN 4 UNITS: 10 INJECTION, SOLUTION INTRAMUSCULAR; INTRAVENOUS at 18:45

## 2022-07-31 RX ADMIN — FENTANYL CITRATE 100 MCG: 50 INJECTION, SOLUTION INTRAMUSCULAR; INTRAVENOUS at 15:03

## 2022-07-31 RX ADMIN — SODIUM CHLORIDE, POTASSIUM CHLORIDE, SODIUM LACTATE AND CALCIUM CHLORIDE: 600; 310; 30; 20 INJECTION, SOLUTION INTRAVENOUS at 18:31

## 2022-07-31 RX ADMIN — CEFAZOLIN 2 G: 10 INJECTION, POWDER, FOR SOLUTION INTRAVENOUS at 13:53

## 2022-08-01 LAB
BASOPHILS # BLD AUTO: 0.05 10*3/MM3 (ref 0–0.2)
BASOPHILS NFR BLD AUTO: 0.3 % (ref 0–1.5)
DEPRECATED RDW RBC AUTO: 40.5 FL (ref 37–54)
EOSINOPHIL # BLD AUTO: 0.04 10*3/MM3 (ref 0–0.4)
EOSINOPHIL NFR BLD AUTO: 0.2 % (ref 0.3–6.2)
ERYTHROCYTE [DISTWIDTH] IN BLOOD BY AUTOMATED COUNT: 13.9 % (ref 12.3–15.4)
HCT VFR BLD AUTO: 29.1 % (ref 34–46.6)
HGB BLD-MCNC: 9.5 G/DL (ref 12–15.9)
IMM GRANULOCYTES # BLD AUTO: 0.18 10*3/MM3 (ref 0–0.05)
IMM GRANULOCYTES NFR BLD AUTO: 1 % (ref 0–0.5)
LYMPHOCYTES # BLD AUTO: 2.14 10*3/MM3 (ref 0.7–3.1)
LYMPHOCYTES NFR BLD AUTO: 11.6 % (ref 19.6–45.3)
MCH RBC QN AUTO: 26.5 PG (ref 26.6–33)
MCHC RBC AUTO-ENTMCNC: 32.6 G/DL (ref 31.5–35.7)
MCV RBC AUTO: 81.3 FL (ref 79–97)
MONOCYTES # BLD AUTO: 1.2 10*3/MM3 (ref 0.1–0.9)
MONOCYTES NFR BLD AUTO: 6.5 % (ref 5–12)
NEUTROPHILS NFR BLD AUTO: 14.82 10*3/MM3 (ref 1.7–7)
NEUTROPHILS NFR BLD AUTO: 80.4 % (ref 42.7–76)
NRBC BLD AUTO-RTO: 0 /100 WBC (ref 0–0.2)
PLATELET # BLD AUTO: 188 10*3/MM3 (ref 140–450)
PMV BLD AUTO: 11.7 FL (ref 6–12)
RBC # BLD AUTO: 3.58 10*6/MM3 (ref 3.77–5.28)
WBC NRBC COR # BLD: 18.43 10*3/MM3 (ref 3.4–10.8)

## 2022-08-01 PROCEDURE — 25010000002 KETOROLAC TROMETHAMINE PER 15 MG: Performed by: OBSTETRICS & GYNECOLOGY

## 2022-08-01 PROCEDURE — 85025 COMPLETE CBC W/AUTO DIFF WBC: CPT | Performed by: OBSTETRICS & GYNECOLOGY

## 2022-08-01 PROCEDURE — 25010000002 DIPHENHYDRAMINE PER 50 MG: Performed by: OBSTETRICS & GYNECOLOGY

## 2022-08-01 RX ORDER — DIPHENHYDRAMINE HYDROCHLORIDE 50 MG/ML
12.5 INJECTION INTRAMUSCULAR; INTRAVENOUS EVERY 6 HOURS PRN
Status: DISCONTINUED | OUTPATIENT
Start: 2022-08-01 | End: 2022-08-04 | Stop reason: HOSPADM

## 2022-08-01 RX ORDER — DIPHENHYDRAMINE HYDROCHLORIDE 50 MG/ML
12.5 INJECTION INTRAMUSCULAR; INTRAVENOUS ONCE AS NEEDED
Status: DISCONTINUED | OUTPATIENT
Start: 2022-08-01 | End: 2022-08-01

## 2022-08-01 RX ORDER — DIPHENHYDRAMINE HYDROCHLORIDE 50 MG/ML
25 INJECTION INTRAMUSCULAR; INTRAVENOUS EVERY 6 HOURS PRN
Status: DISCONTINUED | OUTPATIENT
Start: 2022-08-01 | End: 2022-08-01

## 2022-08-01 RX ADMIN — KETOROLAC TROMETHAMINE 15 MG: 15 INJECTION, SOLUTION INTRAMUSCULAR; INTRAVENOUS at 02:18

## 2022-08-01 RX ADMIN — SERTRALINE HYDROCHLORIDE 25 MG: 25 TABLET ORAL at 08:13

## 2022-08-01 RX ADMIN — KETOROLAC TROMETHAMINE 15 MG: 15 INJECTION, SOLUTION INTRAMUSCULAR; INTRAVENOUS at 13:53

## 2022-08-01 RX ADMIN — ACETAMINOPHEN 650 MG: 325 TABLET ORAL at 23:17

## 2022-08-01 RX ADMIN — OXYCODONE 10 MG: 5 TABLET ORAL at 23:17

## 2022-08-01 RX ADMIN — PRENATAL VITAMINS-IRON FUMARATE 27 MG IRON-FOLIC ACID 0.8 MG TABLET 1 TABLET: at 08:13

## 2022-08-01 RX ADMIN — ACETAMINOPHEN 1000 MG: 500 TABLET, FILM COATED ORAL at 18:02

## 2022-08-01 RX ADMIN — OXYCODONE 5 MG: 5 TABLET ORAL at 19:14

## 2022-08-01 RX ADMIN — DIPHENHYDRAMINE HYDROCHLORIDE 12.5 MG: 50 INJECTION INTRAMUSCULAR; INTRAVENOUS at 00:24

## 2022-08-01 RX ADMIN — KETOROLAC TROMETHAMINE 15 MG: 15 INJECTION, SOLUTION INTRAMUSCULAR; INTRAVENOUS at 20:22

## 2022-08-01 RX ADMIN — ACETAMINOPHEN 1000 MG: 500 TABLET, FILM COATED ORAL at 11:06

## 2022-08-01 RX ADMIN — DOCUSATE SODIUM 100 MG: 100 CAPSULE, LIQUID FILLED ORAL at 08:13

## 2022-08-01 RX ADMIN — DOCUSATE SODIUM 100 MG: 100 CAPSULE, LIQUID FILLED ORAL at 20:22

## 2022-08-01 RX ADMIN — Medication 1 APPLICATION: at 00:08

## 2022-08-01 RX ADMIN — ACETAMINOPHEN 1000 MG: 500 TABLET, FILM COATED ORAL at 04:39

## 2022-08-01 RX ADMIN — KETOROLAC TROMETHAMINE 15 MG: 15 INJECTION, SOLUTION INTRAMUSCULAR; INTRAVENOUS at 08:13

## 2022-08-01 RX ADMIN — ANTACID TABLETS 1 TABLET: 500 TABLET, CHEWABLE ORAL at 09:54

## 2022-08-01 RX ADMIN — SIMETHICONE 80 MG: 80 TABLET, CHEWABLE ORAL at 20:22

## 2022-08-01 NOTE — ANESTHESIA POSTPROCEDURE EVALUATION
Patient: Terrie Hogan    Procedure Summary     Date: 22 Room / Location: [facility] LABOR DELIVERY   [facility]    Anesthesia Start:  Anesthesia Stop:     Procedure:  SECTION REPEAT (Bilateral Abdomen) Diagnosis:     Surgeons: Ina Stephen MD Provider: Buddy Hernandez DO    Anesthesia Type: epidural ASA Status: 2          Anesthesia Type: epidural    Vitals  Vitals Value Taken Time   /57 22 1255   Temp 97.8 °F (36.6 °C) 22 1255   Pulse 83 22 1255   Resp 16 22 1255   SpO2 98 % 22           Post Anesthesia Care and Evaluation    Patient location during evaluation: bedside  Patient participation: complete - patient participated  Level of consciousness: awake and alert  Pain management: adequate    Airway patency: patent  Anesthetic complications: No anesthetic complications    Cardiovascular status: acceptable  Respiratory status: acceptable  Hydration status: acceptable  Post Neuraxial Block status: Motor and sensory function returned to baseline and No signs or symptoms of PDPH

## 2022-08-01 NOTE — PROGRESS NOTES
[facility]   PROGRESS NOTE      Subjective     Patient reports:   POD#1 Repeat C/S at 34w for  labor. Feels well. [place] regular diet. Voiding without diff. Pain controlled. Ambulating. Infant in NICU doing well. VSS. Afebrile PP h/h anemia.    Objective      Vitals: Vital Signs Range for the last 24 hours  Temperature: Temp:  [97.8 °F (36.6 °C)-99.6 °F (37.6 °C)] 97.9 °F (36.6 °C)   Temp Source: Temp src: Oral   BP: BP: ()/(36-85) 120/58   Pulse: Heart Rate:  [] 81   Respirations: Resp:  [16-18] 16   SPO2: SpO2:  [89 %-100 %] 98 %   O2 Amount (l/min):     O2 Devices Device (Oxygen Therapy): room air          Physical Exam    Lungs clear to auscultation bilaterally   Abdomen Soft, non-tender, normal bowel sounds   Incision  healing well, no drainage, no erythema, no swelling, well approximated   Extremities Homans sign is negative, no sign of DVT     LABS:  Lab Results   Component Value Date    WBC 18.43 (H) 2022    HGB 9.5 (L) 2022    HCT 29.1 (L) 2022    MCV 81.3 2022     2022       Assessment & Plan        Delivered by  delivery following previous  delivery      Assessment:    Terrie Hogan is Day 1  post-partum        Plan:  continue post op care.        Yarely Ambrose CNM  2022  17:58 EDT

## 2022-08-01 NOTE — PAYOR COMM NOTE
"Brigitte Lomasn ALLAN (22 y.o. Female)     [place] Medicaid  ID# [id number]    Delivery information.    From:Aileen Wellington LPN, Utilization Review  Phone #[phone]  Fax #[phone]              Date of Birth   2000    Social Security Number       Address   [address] [place] [address]    Home Phone   [phone]    MRN   [id number]       Rastafarian   None    Marital Status                               Admission Date   7/31/22    Admission Type   Elective    Admitting Provider   Beverly Casarez MD    Attending Provider   Yarely Ambrose CNM    Department, Room/Bed   [facility] MOTHER BABY 4B, N437/1       Discharge Date       Discharge Disposition       Discharge Destination                               Attending Provider: Yarely Ambrose CNM    Allergies: Penicillins    Isolation: None   Infection: None   Code Status: CPR   Advance Care Planning Activity    Ht: 172.7 cm (68\")   Wt: 95.3 kg (210 lb)    Admission Cmt: None   Principal Problem: None                Active Insurance as of 7/31/2022     Primary Coverage     Payor Plan Insurance Group Employer/Plan Group    [employer] [id number]     Payor Plan Address Payor Plan Phone Number Payor Plan Fax Number Effective Dates    [address] [address] [phone]  1/1/2022 - None Entered    [facility] [id number]       Subscriber Name Subscriber Birth Date Member ID       LAYNE GONZALEZ [birth year]/28/1997 [id number]           Secondary Coverage     Payor Plan Insurance Group Employer/Plan Group    [employer]HE[place] MEDICAID [facility] MEDICAID [id number]     Payor Plan Address Payor Plan Phone Number Payor Plan Fax Number Effective Dates    [address] [address] [phone]  12/1/2020 - None Entered    [facility] [address]       Subscriber Name Subscriber Birth Date Member ID       NIGHAT LOMAS ALLAN 2000 [id number]                 Emergency Contacts      (Rel.) Home Phone Work Phone Mobile Phone    " Timothy Menjivar (Spouse) -- -- [phone]            Insurance Information                UMR/UMR Phone: [phone]    Subscriber: Adams Menjivar Subscriber#: [id number]    Group#: [id number] Precert#: --        ANTHE[employer] MEDICAID/[employer] MEDICAID Phone: [phone]    Subscriber: HoganBrigitte bakerjohn LEMUS Subscriber#: [id number]    Group#: [id number] Precert#: --             History & Physical      Isacc Lozada, DO at 22 1248          [facility]  Obstetric History and Physical    Referring Provider: Beverly Casarez MD      Chief Complaint   Patient presents with   • Contractions     STARTED ~6AM, increased intensity, ~q5 min       Subjective     Patient is a 22 y.o. female  currently at 34w0d, who presents with complaint of contractions.  Patient reports regular moderate to severe contractions began at 6 AM every 3 to 5 minutes.  Patient denies active vaginal bleeding, leaking of fluid, any other concerns.  Patient reports normal fetal activity.   course complicated by prior .  Patient plans to TOLAC.  Patient prior low transverse  2021    Her prenatal care is complicated by  prior   desires .  Her previous obstetric/gynecological history is remarkable for .    The following portions of the patients history were reviewed and updated as appropriate: current medications, allergies, past medical history, past surgical history, past family history, past social history and problem list .       Prenatal Information:   Maternal Prenatal Labs  Blood Type No results found for: ABO   Rh Status No results found for: RH   Antibody Screen No results found for: ABSCRN   Gonnorhea No results found for: GCCX   Chlamydia No results found for: CLAMYDCU   RPR No results found for: RPR   Syphilis Antibody No results found for: SYPHILIS   Rubella No results found for: RUBELLAIGGIN   Hepatitis B Surface Antigen No results found for: HEPBSAG   HIV-1 Antibody No results found for:  LABHIV1   Hepatitis C Antibody No results found for: HEPCAB   Rapid [place] Drug Screen No results found for: AMPMETHU, BARBITSCNUR, LABBENZSCN, LABMETHSCN, LABOPIASCN, THCURSCR, COCAINEUR, AMPHETSCREEN, PROPOXSCN, BUPRENORSCNU, METAMPSCNUR, OXYCODONESCN, TRICYCLICSCN   Group B Strep Culture No results found for: GBSANTIGEN           External Prenatal Results     Pregnancy Outside Results - Transcribed From Office Records - See Scanned Records For Details     Test Value Date Time    ABO  AB  03/07/22 1258    Rh  Positive  03/07/22 1258    Antibody Screen  Negative  03/07/22 1258    Varicella IgG       Rubella  Positive  10/02/20 1112    Hgb  12.5 g/dL 06/16/22 1030       12.6 g/dL 03/17/22 0005       13.1 g/dL 03/07/22 1258    Hct  37.0 % 06/16/22 1030       36.7 % 03/17/22 0005       38.1 % 03/07/22 1258    Glucose Fasting GTT       Glucose Tolerance Test 1 hour       Glucose Tolerance Test 3 hour       Gonorrhea (discrete)       Chlamydia (discrete)       RPR  Non-Reactive  10/02/20 1112    VDRL       Syphilis Antibody       HBsAg  Non-Reactive  03/07/22 1258    Herpes Simplex Virus PCR       Herpes Simplex VIrus Culture       HIV  Non-Reactive  03/07/22 1258    Hep C RNA Quant PCR       Hep C Antibody  Non-Reactive  03/07/22 1258    AFP       Group B Strep       GBS Susceptibility to Clindamycin       GBS Susceptibility to Erythromycin       Fetal Fibronectin       Genetic Testing, Maternal Blood             Drug Screening     Test Value Date Time    Urine Drug Screen       Amphetamine Screen  Negative  03/07/22 1258    Barbiturate Screen  Negative  03/07/22 1258    Benzodiazepine Screen  Negative  03/07/22 1258    Methadone Screen  Negative  03/07/22 1258    Phencyclidine Screen  Negative  03/07/22 1258    Opiates Screen  Negative  03/07/22 1258    THC Screen  Negative  03/07/22 1258    Cocaine Screen       Propoxyphene Screen  Negative  03/07/22 1258    Buprenorphine Screen  Negative  03/07/22 1258     Methamphetamine Screen       Oxycodone Screen  Negative  22 1258    Tricyclic Antidepressants Screen  Negative  22 1258          Legend    ^: Historical                          Past OB History:       OB History    Para Term  AB Living   3 1 1 0 1 1   SAB IAB Ectopic Molar Multiple Live Births   1 0 0 0 0 1      # Outcome Date GA Lbr Rosendo/2nd Weight Sex Delivery Anes PTL Lv   3 Current            2 SAB 10/2021 6w0d          1 Term 21 39w1d  3505 g (7 lb 11.6 oz) M CS-LTranv EPI N EDIN      Complications: Fetal Intolerance      Name: JAMES LOMAS      Apgar1: 8  Apgar5: 9       Past Medical History: Past Medical History:   Diagnosis Date   • Anxiety       Past Surgical History Past Surgical History:   Procedure Laterality Date   • ADENOIDECTOMY     •  SECTION N/A 2021    Procedure:  SECTION PRIMARY;  Surgeon: Lavelle Ortega III, MD;  Location: [facility] LABOR DELIVERY;  Service: Obstetrics/Gynecology;  Laterality: N/A;   • TONSILLECTOMY     • WISDOM TOOTH EXTRACTION        Family History: Family History   Problem Relation Age of Onset   • Interstitial cystitis Mother       Social History:  reports that she has never smoked. She has never used smokeless tobacco.   reports no history of alcohol use.   reports no history of drug use.                   General ROS Negative Findings:Headaches, Visual Changes, Epigastric pain, Anorexia, Nausia/Vomiting, ROM and Vaginal Bleeding    ROS     All other systems have been reviewed and are neg  Objective       Vital Signs Range for the last 24 hours  Temperature: Temp:  [98.4 °F (36.9 °C)-99 °F (37.2 °C)] 99 °F (37.2 °C)   Temp Source: Temp src: Oral   BP: BP: (120-123)/(75-80) 120/80   Pulse: Heart Rate:  [] 108   Respirations: Resp:  [18-24] 24   SPO2: SpO2:  [98 %] 98 %   O2 Amount (l/min):     O2 Devices Device (Oxygen Therapy): room air   Weight: Weight:  [95.3 kg (210 lb)] 95.3 kg (210 lb)     Physical  Examination:   General:   alert, appears stated age and cooperative   Skin:   normal   HEENT:  Sclera clear   Lungs:   clear to auscultation bilaterally   Heart:   regular rate and rhythm, S1, S2 normal, no murmur, click, rub or gallop   Gastrointestinal:  Gravid uterus nontender, no guarding benign exam   Lower Extremities:  trace edema, no calf tenderness   : External genitalia: normal general appearance  Uterus: enlarged   Musculoskeletal:     Neuro:  No focal deficit, DTR 2+4 no clonus         Presentation: vtx   Cervix: Exam by: Method: sterile exam per physician ([place])   Dilation:  5cm   Effacement: Cervical Effacement: 80%   Station:  -1       Fetal Heart Rate Assessment   Method:     Beats/min:     Baseline:     Varibility:     Accels:     Decels:     Tracing Category:       Uterine Assessment   Method:     Frequency (min):     Ctx Count in 10 min:     Duration:     Intensity:     Intensity by IUPC:     Resting Tone:     Resting Tone by IUPC:     [place] Units:       Laboratory Results:   Lab Results (last 24 hours)     ** No results found for the last 24 hours. **        Radiology Review:   Imaging Results (Last 24 Hours)     ** No results found for the last 24 hours. **        Other Studies:    Assessment & Plan       Normal labor        Assessment:  1.  Intrauterine pregnancy at 34w0d weeks gestation with reactive fetal status.    2.  Active labor  3.  History of prior low transverse   4.  Patient desires TOLAC informed consent obtained questions answered    Plan:  1.  Admit, labs, GBS prophylaxis, notify NICU of admission.  Expect vaginal delivery  2. Plan of care has been reviewed with patient.  3.  Risks, benefits of treatment plan have been discussed.  4.  All questions have been answered.  5   discussed with Dr. Zafar Lozada DO  2022  12:48 EDT    Electronically signed by Isacc Lozada DO at 22 1254          Operative/Procedure Notes (last 72 hours)       Ina Stephen MD at 22 1839          Repeat Low Transverse  Section Procedure Note    Terrie Hogan    2022     Indications: 1. IUP at 34w0d   2. Previous  x 1, desired TOLAC   3.  labor   4. Placental abruption    Pre-operative Diagnosis: 34w0d    Post-operative Diagnosis: same    Findings: VMI in vertex presentation; clot noted upon entry into hysterotomy; thin FAM, otherwise normal appearing maternal anatomy    Birth Information  YOB: 2022   Time of birth: 6:44 PM   Delivering clinician:     Sex: male   Delivery type: , Low Transverse   Breech type (if applicable):     Observed anomalies/comments:         Quantitative Blood Loss:  753cc           Drains: Parker                 Specimens: placenta (not sent to pathology); cord segment for gases               Complications:  None; patient tolerated the procedure well.           Disposition: PACU - hemodynamically stable.           Condition: stable    Surgeon: Ina Stephen MD     Assistants: Curtis Lozada DO - his assistance was required for retraction and suctioning and was necessary for the success of the case    Anesthesia: Epidural anesthesia    ASA Class: 2    Procedure Details   The patient was seen in the Labor and Delivery Room. The risks, benefits, complications, treatment options, and expected outcomes were discussed with the patient.  The patient concurred with the proposed plan, giving informed consent.  The site of surgery was properly noted. The patient was taken to the Operating Room, identified as Terrie Hogan and the procedure verified as  Delivery. A Time Out was held and the above information confirmed.    The patient was taken to the operating room where epidural anesthesia was found to be adequate. She was prepped and draped in the usual sterile fashion in the dorsal supine position with a leftward tilt. A Pfannenstiel skin incision was made with the  scalpel and carried through to the underlying layer of fascia using the bovie. The fascia was then nicked in the midline, and the fascial incision was extended laterally. The superior aspect of the fascial incision was then grasped with Kochers, elevated, and the underlying rectus muscles were dissected off bluntly and sharply. In a similar fashion, the inferior aspect of the fascial incision was grasped with the Kochers, elevated, and the underlying rectus muscles were dissected off bluntly and sharply. The rectus muscles were then  in the midline and the peritoneum was identified. The peritoneum was entered bluntly, and this incision was extended superiorly and inferiorly with good visualization of underlying structures.  The bladder blade was then inserted. The vesicouterine peritoneum was identified, grasped with the pickups, and entered sharply using the Metzenbaum scissors. The incision was extended laterally and a bladder flap was created digitally. The bladder blade was reinserted. The lower uterine segment was identified and a low transverse uterine incision was made using the scalpel. Clot extruded from the hysterotomy. Delivered from vertex presentation was a   Measurements  Weight (oz): 98.06    Length (in): 19    Head circumference (in):      Chest circumference (in):      with apgars scores of         APGARS  One minute Five minutes Ten minutes Fifteen minutes Twenty minutes   Skin color: 0   1             Heart rate: 2   2             Grimace: 1   2              Muscle tone: 0   2              Breathin   2              Totals: 3   9              . The nose and mouth were suctioned, the cord was clamped and cut, and the infant was handed off to the awaiting Delivery Room Team. A cord segment for gases and cord blood were then obtained. The placenta was removed intact and appeared normal. The uterus was then exteriorized from the abdominal cavity and cleared of all clots and debris.  The uterine outline, tubes and ovaries appeared normal. The hysterotomy was then closed using 1 Monocryl in a running, locked fashion. Bovie cautery was used to address oozing vessels. Hemostasis was observed.  The uterus was placed back inside the abdominal cavity, and the gutters were cleared of clots and debris. The hysterotomy was again reexamined and excellent hemostasis continued to be noted. The fascia was then reapproximated with running sutures of 0 PDS. The incision was then irrigated, and the subcutaneous tissue was reapproximated with 3-0 plain. The skin was reapproximated with 4-0 Monocryl and Skin glue.    Instrument, sponge, and needle counts were correct prior the abdominal closure and at the conclusion of the case.         Ina Stephen MD  19:12 EDT  2022        Electronically signed by Ina Stephen MD at 22     Ina Stephen MD at 22          Repeat Low Transverse  Section Procedure Note    Terrie Hogan    2022     Indications: 1. IUP at 34w0d   2. Previous  x 1, desired TOLAC   3.  labor   4. Placental abruption    Pre-operative Diagnosis: 34w0d    Post-operative Diagnosis: same    Findings: VMI in vertex presentation; clot noted upon entry into hysterotomy; thin FAM, otherwise normal appearing maternal anatomy    Birth Information  YOB: 2022   Time of birth: 6:44 PM   Delivering clinician:     Sex: male   Delivery type: , Low Transverse   Breech type (if applicable):     Observed anomalies/comments:         Quantitative Blood Loss:  753cc           Drains: Parker                 Specimens: placenta (not sent to pathology); cord segment for gases               Complications:  None; patient tolerated the procedure well.           Disposition: PACU - hemodynamically stable.           Condition: stable    Surgeon: Ina Stephen MD     Assistants: Curtis Lozada DO - his assistance was required for  retraction and suctioning and was necessary for the success of the case    Anesthesia: Epidural anesthesia    ASA Class: 2    Procedure Details   The patient was seen in the Labor and Delivery Room. The risks, benefits, complications, treatment options, and expected outcomes were discussed with the patient.  The patient concurred with the proposed plan, giving informed consent.  The site of surgery was properly noted. The patient was taken to the Operating Room, identified as Terrie Hogan and the procedure verified as  Delivery. A Time Out was held and the above information confirmed.    The patient was taken to the operating room where epidural anesthesia was found to be adequate. She was prepped and draped in the usual sterile fashion in the dorsal supine position with a leftward tilt. A Pfannenstiel skin incision was made with the scalpel and carried through to the underlying layer of fascia using the bovie. The fascia was then nicked in the midline, and the fascial incision was extended laterally. The superior aspect of the fascial incision was then grasped with Kochers, elevated, and the underlying rectus muscles were dissected off bluntly and sharply. In a similar fashion, the inferior aspect of the fascial incision was grasped with the Kochers, elevated, and the underlying rectus muscles were dissected off bluntly and sharply. The rectus muscles were then  in the midline and the peritoneum was identified. The peritoneum was entered bluntly, and this incision was extended superiorly and inferiorly with good visualization of underlying structures.  The bladder blade was then inserted. The vesicouterine peritoneum was identified, grasped with the pickups, and entered sharply using the Metzenbaum scissors. The incision was extended laterally and a bladder flap was created digitally. The bladder blade was reinserted. The lower uterine segment was identified and a low transverse uterine  incision was made using the scalpel. Clot extruded from the hysterotomy. Delivered from vertex presentation was a  [place] Measurements  Weight (oz): 98.06    Length (in): 19    Head circumference (in):      Chest circumference (in):      with apgars scores of         APGARS  One minute Five minutes Ten minutes Fifteen minutes Twenty minutes   Skin color: 0   1             Heart rate: 2   2             Grimace: 1   2              Muscle tone: 0   2              Breathin   2              Totals: 3   9              . The nose and mouth were suctioned, the cord was clamped and cut, and the infant was handed off to the awaiting Delivery Room Team. A cord segment for gases and cord blood were then obtained. The placenta was removed intact and appeared normal. The uterus was then exteriorized from the abdominal cavity and cleared of all clots and debris. The uterine outline, tubes and ovaries appeared normal. The hysterotomy was then closed using 1 Monocryl in a running, locked fashion. Bovie cautery was used to address oozing vessels. Hemostasis was observed.  The uterus was placed back inside the abdominal cavity, and the gutters were cleared of clots and debris. The hysterotomy was again reexamined and excellent hemostasis continued to be noted. The fascia was then reapproximated with running sutures of 0 PDS. The incision was then irrigated, and the subcutaneous tissue was reapproximated with 3-0 plain. The skin was reapproximated with 4-0 Monocryl and Skin glue.    Instrument, sponge, and needle counts were correct prior the abdominal closure and at the conclusion of the case.         Ina Stephen MD  19:12 EDT  2022      Electronically signed by Ina Stephen MD at 22 1918          Physician Progress Notes (last 72 hours)      Ina Stephen MD at 22 1627          22  16:27 EDT  Terrie Hogan    SUBJECTIVE: Terrie is resting in bed, comfortable with epidural.  "    ASSESSMENTS:     /55   Pulse 110   Temp 98.7 °F (37.1 °C) (Oral)   Resp 22   Ht 172.7 cm (68\")   Wt 95.3 kg (210 lb)   SpO2 100%   BMI 31.93 kg/m²     Fetal Heart Rate Assessment   Method: Fetal HR Assessment Method: external   Beats/min: Fetal HR (beats/min): 150   Baseline: Fetal HR Baseline: normal range   Varibility: Fetal HR Variability: moderate (amplitude range 6 to 25 bpm)   Accels: Fetal HR Accelerations: greater than/equal to 15 bpm   Decels: Fetal HR Decelerations: absent   Tracing Category:  I     Uterine Assessment   Method: Method: external tocotransducer   Frequency (min): Contraction Frequency (Minutes): 3-5   Ctx Count in 10 min:     Duration:     Intensity: Contraction Intensity: moderate by palpation   Intensity by IUPC:     Resting Tone: Uterine Resting Tone: soft by palpation   Resting Tone by IUPC:       Presentation: vtx   Cervix: Exam by: Method: sterile exam per RN   Dilation:  5cm   Effacement: Cervical Effacement: 80%   Station:  -1            PLAN: Ancef for GBS ppx (has PCN allergy). Plan AROM when appropriate. Continue CEFM, FHR reassuring.    Ina Stephen MD  16:27 EDT  07/31/22      Electronically signed by Ina Stephen MD at 07/31/22 1751       "

## 2022-08-01 NOTE — LACTATION NOTE
Breastmilk production education completed to include proper milk handling and breast pump cleaning recommendations. Pumping encouraged every three hours, or at baby's feeding times for optimal milk initiation/production. PRN Lactation Consultant/Clinic contact encouraged.       08/01/22 1245   Maternal Information   Date of Referral 08/01/22   Person Making Referral physician  (consult)   Maternal Reason for Referral separation from infant   Infant Reason for Referral NICU admission   Maternal Assessment   Breast Size Issue none   Breast Shape Bilateral:;round   Breast Density Bilateral:;soft   Nipples Bilateral:;flat   Left Nipple Symptoms intact;nontender   Right Nipple Symptoms intact;nontender   Maternal Infant Feeding   Maternal Emotional State independent;receptive   Support Person Involvement actively supporting mother   Milk Expression/Equipment   Breast Pump Type double electric, hospital grade   Equipment for Home Use other (see comments)  (Pump for [employer] contract provided and contact information provided to borrow Symphony pump and return on baby's day of dischargge in addition to providing [employer] supplier info to deliver Spectra S2 breast pump to pt's room)   Breast Pumping   Breast Pumping Interventions early pumping promoted;frequent pumping encouraged   Breast Pumping double electric breast pump utilized

## 2022-08-02 RX ORDER — FERROUS SULFATE 325(65) MG
325 TABLET ORAL 2 TIMES DAILY WITH MEALS
Status: DISCONTINUED | OUTPATIENT
Start: 2022-08-02 | End: 2022-08-04 | Stop reason: HOSPADM

## 2022-08-02 RX ADMIN — SIMETHICONE 80 MG: 80 TABLET, CHEWABLE ORAL at 20:54

## 2022-08-02 RX ADMIN — SIMETHICONE 80 MG: 80 TABLET, CHEWABLE ORAL at 17:42

## 2022-08-02 RX ADMIN — ACETAMINOPHEN 650 MG: 325 TABLET ORAL at 16:35

## 2022-08-02 RX ADMIN — ACETAMINOPHEN 650 MG: 325 TABLET ORAL at 22:20

## 2022-08-02 RX ADMIN — SIMETHICONE 80 MG: 80 TABLET, CHEWABLE ORAL at 13:58

## 2022-08-02 RX ADMIN — SERTRALINE HYDROCHLORIDE 25 MG: 25 TABLET ORAL at 10:55

## 2022-08-02 RX ADMIN — IBUPROFEN 600 MG: 600 TABLET ORAL at 01:51

## 2022-08-02 RX ADMIN — IBUPROFEN 600 MG: 600 TABLET ORAL at 20:58

## 2022-08-02 RX ADMIN — ACETAMINOPHEN 650 MG: 325 TABLET ORAL at 10:55

## 2022-08-02 RX ADMIN — DOCUSATE SODIUM 100 MG: 100 CAPSULE, LIQUID FILLED ORAL at 20:55

## 2022-08-02 RX ADMIN — DOCUSATE SODIUM 100 MG: 100 CAPSULE, LIQUID FILLED ORAL at 07:46

## 2022-08-02 RX ADMIN — IBUPROFEN 600 MG: 600 TABLET ORAL at 13:58

## 2022-08-02 RX ADMIN — PRENATAL VITAMINS-IRON FUMARATE 27 MG IRON-FOLIC ACID 0.8 MG TABLET 1 TABLET: at 07:46

## 2022-08-02 RX ADMIN — IBUPROFEN 600 MG: 600 TABLET ORAL at 07:45

## 2022-08-02 RX ADMIN — OXYCODONE 5 MG: 5 TABLET ORAL at 17:41

## 2022-08-02 RX ADMIN — FERROUS SULFATE TAB 325 MG (65 MG ELEMENTAL FE) 325 MG: 325 (65 FE) TAB at 10:55

## 2022-08-02 RX ADMIN — ACETAMINOPHEN 650 MG: 325 TABLET ORAL at 05:11

## 2022-08-02 RX ADMIN — OXYCODONE 5 MG: 5 TABLET ORAL at 10:55

## 2022-08-02 RX ADMIN — FERROUS SULFATE TAB 325 MG (65 MG ELEMENTAL FE) 325 MG: 325 (65 FE) TAB at 17:42

## 2022-08-02 NOTE — PROGRESS NOTES
2022    Name:Terrie Hogan    MR#:[id number]     PROGRESS NOTE:  Post-Op 2 S/P        Subjective   22 y.o. yo Female  s/p CS at 34w0d doing well. Pain well controlled, lochia appropriate, tolerating diet. Infant is doing well in NICU.     Patient Active Problem List   Diagnosis   • Delivery by  section of full-term infant   • Delivered by  delivery following previous  delivery        Objective    Vitals  Temp:  Temp:  [97.8 °F (36.6 °C)-98.6 °F (37 °C)] 98.2 °F (36.8 °C)  Temp src: Oral  BP:  BP: (114-131)/(53-62) 131/62  Pulse:  Heart Rate:  [81-94] 94  RR:   Resp:  [16-18] 18    General Awake, alert, no distress  Abdomen Soft, non-distended, fundus firm, below umbilicus, appropriately tender  Incision  Intact, no erythema or exudate  Extremities Calves NT bilaterally     I/O last 3 completed shifts:  In: 1200 [I.V.:1200]  Out: 3358 [Urine:2605; Blood:753]    Lab Results   Component Value Date    WBC 18.43 (H) 2022    HGB 9.5 (L) 2022    HCT 29.1 (L) 2022    MCV 81.3 2022     2022    URICACID 3.4 2022    AST 23 2022    ALT 14 2022     2022    HEPBSAG Non-Reactive 2022     Results from last 7 days   Lab Units 22  1331   ABO TYPING  AB   RH TYPING  Positive   ANTIBODY SCREEN  Negative       Infant: male       Assessment   1.  POD 2 from  Section   2.  PP anemia 2/2 acute blood loss    Plan: Doing well.  Begin ferrous sulfate 325mg PO BID  Consideration for d/c as clinically indicated.           Pola Zamorano CNM  2022 08:59 EDT

## 2022-08-03 RX ADMIN — PRENATAL VITAMINS-IRON FUMARATE 27 MG IRON-FOLIC ACID 0.8 MG TABLET 1 TABLET: at 08:29

## 2022-08-03 RX ADMIN — ACETAMINOPHEN 650 MG: 325 TABLET ORAL at 10:38

## 2022-08-03 RX ADMIN — IBUPROFEN 600 MG: 600 TABLET ORAL at 19:55

## 2022-08-03 RX ADMIN — SERTRALINE HYDROCHLORIDE 25 MG: 25 TABLET ORAL at 08:29

## 2022-08-03 RX ADMIN — OXYCODONE 5 MG: 5 TABLET ORAL at 10:38

## 2022-08-03 RX ADMIN — IBUPROFEN 600 MG: 600 TABLET ORAL at 01:26

## 2022-08-03 RX ADMIN — ACETAMINOPHEN 650 MG: 325 TABLET ORAL at 17:13

## 2022-08-03 RX ADMIN — DOCUSATE SODIUM 100 MG: 100 CAPSULE, LIQUID FILLED ORAL at 21:23

## 2022-08-03 RX ADMIN — FERROUS SULFATE TAB 325 MG (65 MG ELEMENTAL FE) 325 MG: 325 (65 FE) TAB at 08:29

## 2022-08-03 RX ADMIN — ACETAMINOPHEN 650 MG: 325 TABLET ORAL at 05:34

## 2022-08-03 RX ADMIN — IBUPROFEN 600 MG: 600 TABLET ORAL at 14:17

## 2022-08-03 RX ADMIN — DOCUSATE SODIUM 100 MG: 100 CAPSULE, LIQUID FILLED ORAL at 08:30

## 2022-08-03 RX ADMIN — SIMETHICONE 80 MG: 80 TABLET, CHEWABLE ORAL at 17:13

## 2022-08-03 RX ADMIN — OXYCODONE 5 MG: 5 TABLET ORAL at 19:55

## 2022-08-03 RX ADMIN — OXYCODONE 5 MG: 5 TABLET ORAL at 01:26

## 2022-08-03 RX ADMIN — FERROUS SULFATE TAB 325 MG (65 MG ELEMENTAL FE) 325 MG: 325 (65 FE) TAB at 17:13

## 2022-08-03 RX ADMIN — ACETAMINOPHEN 650 MG: 325 TABLET ORAL at 22:41

## 2022-08-03 RX ADMIN — IBUPROFEN 600 MG: 600 TABLET ORAL at 08:29

## 2022-08-03 NOTE — PROGRESS NOTES
[facility]   PROGRESS NOTE      Subjective     Patient reports:   POD#3 Repeat C/S at 34w for labor onset. Infant in NICU but doing well. Pt reports tolerating regular diet. Voiding without difficulty. Pain controlled. Decreasing lochia. VSS. Afebrile.    Objective      Vitals: Vital Signs Range for the last 24 hours  Temperature: Temp:  [97.6 °F (36.4 °C)-98.3 °F (36.8 °C)] 98.2 °F (36.8 °C)   Temp Source: Temp src: Oral   BP: BP: (104-118)/(55-58) 118/56   Pulse: Heart Rate:  [85-87] 87   Respirations: Resp:  [16-18] 18   SPO2:     O2 Amount (l/min):     O2 Devices            Physical Exam    Lungs Even/unlabored;clear to ausculation   Abdomen Soft, non tender, non distended   Incision  Clean, intact, without redness, drainage or swelling   Extremities Extremities atraumatic     LABS:  Lab Results   Component Value Date    WBC 18.43 (H) 2022    HGB 9.5 (L) 2022    HCT 29.1 (L) 2022    MCV 81.3 2022     2022       Assessment & Plan        Delivered by  delivery following previous  delivery    Postpartum anemia      Assessment:    Terrie Hogan is Day #3 post-partum        Plan:  Continue routine postop care. Anticipate discharge tomorrow        Yarely Ambrose CNM  8/3/2022  17:31 EDT

## 2022-08-04 VITALS
HEART RATE: 88 BPM | SYSTOLIC BLOOD PRESSURE: 128 MMHG | OXYGEN SATURATION: 98 % | HEIGHT: 68 IN | DIASTOLIC BLOOD PRESSURE: 71 MMHG | BODY MASS INDEX: 31.83 KG/M2 | TEMPERATURE: 97.7 F | WEIGHT: 210 LBS | RESPIRATION RATE: 20 BRPM

## 2022-08-04 RX ORDER — IBUPROFEN 600 MG/1
600 TABLET ORAL EVERY 6 HOURS
Qty: 60 TABLET | Refills: 1 | Status: SHIPPED | OUTPATIENT
Start: 2022-08-04

## 2022-08-04 RX ORDER — OXYCODONE HYDROCHLORIDE 5 MG/1
5 TABLET ORAL EVERY 4 HOURS PRN
Qty: 18 TABLET | Refills: 0 | Status: SHIPPED | OUTPATIENT
Start: 2022-08-04 | End: 2022-08-07

## 2022-08-04 RX ORDER — FERROUS SULFATE 325(65) MG
325 TABLET ORAL
Qty: 60 TABLET | Refills: 0 | Status: SHIPPED | OUTPATIENT
Start: 2022-08-04

## 2022-08-04 RX ORDER — DOCUSATE SODIUM 100 MG/1
100 CAPSULE, LIQUID FILLED ORAL 2 TIMES DAILY PRN
Qty: 60 CAPSULE | Refills: 1 | Status: SHIPPED | OUTPATIENT
Start: 2022-08-04

## 2022-08-04 RX ADMIN — IBUPROFEN 600 MG: 600 TABLET ORAL at 01:57

## 2022-08-04 RX ADMIN — PRENATAL VITAMINS-IRON FUMARATE 27 MG IRON-FOLIC ACID 0.8 MG TABLET 1 TABLET: at 08:22

## 2022-08-04 RX ADMIN — SERTRALINE HYDROCHLORIDE 25 MG: 25 TABLET ORAL at 08:22

## 2022-08-04 RX ADMIN — IBUPROFEN 600 MG: 600 TABLET ORAL at 08:22

## 2022-08-04 RX ADMIN — DOCUSATE SODIUM 100 MG: 100 CAPSULE, LIQUID FILLED ORAL at 08:22

## 2022-08-04 RX ADMIN — ACETAMINOPHEN 650 MG: 325 TABLET ORAL at 04:51

## 2022-08-04 RX ADMIN — FERROUS SULFATE TAB 325 MG (65 MG ELEMENTAL FE) 325 MG: 325 (65 FE) TAB at 08:22

## 2022-08-04 RX ADMIN — OXYCODONE 5 MG: 5 TABLET ORAL at 08:46

## 2022-08-04 NOTE — DISCHARGE SUMMARY
[facility]   Discharge Summary      Patient: Terrie Hogan      MR#:[id number]  Admission  Diagnosis: <principal problem not specified>  Discharge Diagnosis:   1. Delivered by  delivery following previous  delivery        Date of Admission: 2022  Date of Discharge:  2022    Procedures:  , Low Transverse     2022    6:44 PM      Service:  Obstetrics    Hospital Course:  Patient underwent  section and remained in the hospital for 4 days.  During that time she remained afebrile and hemodynamically stable.  On the day of discharge, she was eating, ambulating and voiding without difficulty.        Labs:    Lab Results (last 24 hours)     ** No results found for the last 24 hours. **          Discharge Medications     Discharge Medications      New Medications      Instructions Start Date   ibuprofen 600 MG tablet  Commonly known as: ADVIL,MOTRIN   600 mg, Oral, Every 6 Hours      oxyCODONE 5 MG immediate release tablet  Commonly known as: ROXICODONE   5 mg, Oral, Every 4 Hours PRN         Continue These Medications      Instructions Start Date   docusate sodium 100 MG capsule  Commonly known as: COLACE   100 mg, Oral, 2 Times Daily PRN      ferrous sulfate 325 (65 FE) MG tablet   325 mg, Oral, 2 Times Daily Before Meals      lanolin cream   Topical, Every 1 Hour PRN      Non-Aspirin Pain Reliever 325 MG tablet  Generic drug: acetaminophen   650 mg, Oral, Every 6 Hours      prenatal vitamin  tablet   Oral, Daily      sertraline 25 MG tablet  Commonly known as: ZOLOFT   25 mg, Oral, Daily             Discharge Disposition:  To Home    Discharge Condition:  Stable    Discharge Diet: regular    Activity at Discharge: pelvic rest    Follow-up Appointments  No future appointments.      Yarely Ambrose CNM  22  10:45 EDT

## 2022-08-04 NOTE — LACTATION NOTE
08/03/22 1530   Maternal Information   Person Making Referral lactation consultant   Maternal Reason for Referral   (pumped for 1st baby x1 month)   Infant Reason for Referral NICU admission   Maternal Assessment   Breast Density filling   Milk Expression/Equipment   Breast Pump Type double electric, personal;double electric, hospital grade  (using hospital pump; has personal pump to use when baby goes home from NICU;  has PUmp for Premie to borrow whiel baby is in NICU)   Breast Pump Flange Type hard   Breast Pump Flange Size 24 mm   Breast Pumping   Breast Pumping Interventions early pumping promoted;frequent pumping encouraged  (encouraged to pump every 3 hours to get best milk supply possible)   Discussed how to avoid and treat engorgement. Milk is in and mom got 70 ml at last pumping.

## 2022-08-04 NOTE — PROGRESS NOTES
[facility]   PROGRESS NOTE      Subjective     Patient reports:   PPD#4 Repeat C/S at 34w. Feeling well. Tolerating regular diet. Voiding without difficulty. Pain controlled. Decreasing lochia without clots. VSS. Afebrile. PP h/h showed mild anemia without symptoms. Infant in NICU but doing well.     Objective      Vitals: Vital Signs Range for the last 24 hours  Temperature: Temp:  [97.7 °F (36.5 °C)-98.2 °F (36.8 °C)] 97.7 °F (36.5 °C)   Temp Source: Temp src: Oral   BP: BP: (118-131)/(56-71) 128/71   Pulse: Heart Rate:  [87-94] 88   Respirations: Resp:  [18-20] 20   SPO2:     O2 Amount (l/min):     O2 Devices            Physical Exam    Lungs clear to auscultation bilaterally   Abdomen Soft, non-tender,  bowel sounds present   Incision  healing well, no drainage, no swelling, well approximated   Extremities Homans sign is negative, no sign of DVT     LABS:  Lab Results   Component Value Date    WBC 18.43 (H) 2022    HGB 9.5 (L) 2022    HCT 29.1 (L) 2022    MCV 81.3 2022     2022       Assessment & Plan        Delivered by  delivery following previous  delivery    Postpartum anemia      Assessment:    Terrie Hogan is Day 4  post-partum        Plan:  plan for discharge today.        Yarely Ambrose CNM  2022  10:41 EDT

## 2023-10-23 ENCOUNTER — HOSPITAL ENCOUNTER (EMERGENCY)
Facility: HOSPITAL | Age: 23
Discharge: HOME | End: 2023-10-23
Attending: EMERGENCY MEDICINE | Admitting: EMERGENCY MEDICINE
Payer: COMMERCIAL

## 2023-10-23 VITALS
WEIGHT: 200 LBS | HEIGHT: 68 IN | RESPIRATION RATE: 18 BRPM | HEART RATE: 117 BPM | OXYGEN SATURATION: 98 % | TEMPERATURE: 98.6 F | BODY MASS INDEX: 30.31 KG/M2 | SYSTOLIC BLOOD PRESSURE: 127 MMHG | DIASTOLIC BLOOD PRESSURE: 59 MMHG

## 2023-10-23 DIAGNOSIS — G43.909 MIGRAINE WITHOUT STATUS MIGRAINOSUS, NOT INTRACTABLE, UNSPECIFIED MIGRAINE TYPE: Primary | ICD-10-CM

## 2023-10-23 LAB
ALBUMIN SERPL-MCNC: 4.4 G/DL (ref 3.5–5.2)
ALBUMIN/GLOB SERPL: 1.6 G/DL
ALP SERPL-CCNC: 81 U/L (ref 39–117)
ALT SERPL W P-5'-P-CCNC: 30 U/L (ref 1–33)
ANION GAP SERPL CALCULATED.3IONS-SCNC: 11.5 MMOL/L (ref 5–15)
AST SERPL-CCNC: 26 U/L (ref 1–32)
BASOPHILS # BLD AUTO: 0.03 10*3/MM3 (ref 0–0.2)
BASOPHILS NFR BLD AUTO: 0.4 % (ref 0–1.5)
BILIRUB SERPL-MCNC: 0.4 MG/DL (ref 0–1.2)
BUN SERPL-MCNC: 6 MG/DL (ref 6–20)
BUN/CREAT SERPL: 8.6 (ref 7–25)
CALCIUM SPEC-SCNC: 8.9 MG/DL (ref 8.6–10.5)
CHLORIDE SERPL-SCNC: 102 MMOL/L (ref 98–107)
CO2 SERPL-SCNC: 20.5 MMOL/L (ref 22–29)
CREAT SERPL-MCNC: 0.7 MG/DL (ref 0.57–1)
DEPRECATED RDW RBC AUTO: 42.6 FL (ref 37–54)
EGFRCR SERPLBLD CKD-EPI 2021: 124.8 ML/MIN/1.73
EOSINOPHIL # BLD AUTO: 0 10*3/MM3 (ref 0–0.4)
EOSINOPHIL NFR BLD AUTO: 0 % (ref 0.3–6.2)
ERYTHROCYTE [DISTWIDTH] IN BLOOD BY AUTOMATED COUNT: 13.5 % (ref 12.3–15.4)
FLUAV RNA RESP QL NAA+PROBE: NOT DETECTED
FLUBV RNA RESP QL NAA+PROBE: NOT DETECTED
GLOBULIN UR ELPH-MCNC: 2.8 GM/DL
GLUCOSE SERPL-MCNC: 155 MG/DL (ref 65–99)
HCT VFR BLD AUTO: 38.7 % (ref 34–46.6)
HGB BLD-MCNC: 12.8 G/DL (ref 12–15.9)
IMM GRANULOCYTES # BLD AUTO: 0.03 10*3/MM3 (ref 0–0.05)
IMM GRANULOCYTES NFR BLD AUTO: 0.4 % (ref 0–0.5)
LYMPHOCYTES # BLD AUTO: 1.5 10*3/MM3 (ref 0.7–3.1)
LYMPHOCYTES NFR BLD AUTO: 18.2 % (ref 19.6–45.3)
MCH RBC QN AUTO: 28.1 PG (ref 26.6–33)
MCHC RBC AUTO-ENTMCNC: 33.1 G/DL (ref 31.5–35.7)
MCV RBC AUTO: 85.1 FL (ref 79–97)
MONOCYTES # BLD AUTO: 0.48 10*3/MM3 (ref 0.1–0.9)
MONOCYTES NFR BLD AUTO: 5.8 % (ref 5–12)
NEUTROPHILS NFR BLD AUTO: 6.21 10*3/MM3 (ref 1.7–7)
NEUTROPHILS NFR BLD AUTO: 75.2 % (ref 42.7–76)
NRBC BLD AUTO-RTO: 0 /100 WBC (ref 0–0.2)
PLATELET # BLD AUTO: 285 10*3/MM3 (ref 140–450)
PMV BLD AUTO: 10.5 FL (ref 6–12)
POTASSIUM SERPL-SCNC: 3.6 MMOL/L (ref 3.5–5.2)
PROT SERPL-MCNC: 7.2 G/DL (ref 6–8.5)
RBC # BLD AUTO: 4.55 10*6/MM3 (ref 3.77–5.28)
SARS-COV-2 RNA RESP QL NAA+PROBE: NOT DETECTED
SODIUM SERPL-SCNC: 134 MMOL/L (ref 136–145)
WBC NRBC COR # BLD: 8.25 10*3/MM3 (ref 3.4–10.8)

## 2023-10-23 PROCEDURE — 96375 TX/PRO/DX INJ NEW DRUG ADDON: CPT

## 2023-10-23 PROCEDURE — 25010000002 METOCLOPRAMIDE PER 10 MG: Performed by: PHYSICIAN ASSISTANT

## 2023-10-23 PROCEDURE — 96374 THER/PROPH/DIAG INJ IV PUSH: CPT

## 2023-10-23 PROCEDURE — 80053 COMPREHEN METABOLIC PANEL: CPT | Performed by: PHYSICIAN ASSISTANT

## 2023-10-23 PROCEDURE — 87636 SARSCOV2 & INF A&B AMP PRB: CPT | Performed by: PHYSICIAN ASSISTANT

## 2023-10-23 PROCEDURE — 85025 COMPLETE CBC W/AUTO DIFF WBC: CPT | Performed by: PHYSICIAN ASSISTANT

## 2023-10-23 PROCEDURE — 25810000003 SODIUM CHLORIDE 0.9 % SOLUTION: Performed by: PHYSICIAN ASSISTANT

## 2023-10-23 PROCEDURE — 25010000002 KETOROLAC TROMETHAMINE PER 15 MG: Performed by: PHYSICIAN ASSISTANT

## 2023-10-23 PROCEDURE — 99283 EMERGENCY DEPT VISIT LOW MDM: CPT

## 2023-10-23 PROCEDURE — 25010000002 DIPHENHYDRAMINE PER 50 MG: Performed by: PHYSICIAN ASSISTANT

## 2023-10-23 RX ORDER — KETOROLAC TROMETHAMINE 30 MG/ML
30 INJECTION, SOLUTION INTRAMUSCULAR; INTRAVENOUS ONCE
Status: COMPLETED | OUTPATIENT
Start: 2023-10-23 | End: 2023-10-23

## 2023-10-23 RX ORDER — ACETAMINOPHEN 500 MG
1000 TABLET ORAL ONCE
Status: COMPLETED | OUTPATIENT
Start: 2023-10-23 | End: 2023-10-23

## 2023-10-23 RX ORDER — DIPHENHYDRAMINE HYDROCHLORIDE 50 MG/ML
25 INJECTION INTRAMUSCULAR; INTRAVENOUS ONCE
Status: COMPLETED | OUTPATIENT
Start: 2023-10-23 | End: 2023-10-23

## 2023-10-23 RX ORDER — METOCLOPRAMIDE HYDROCHLORIDE 5 MG/ML
10 INJECTION INTRAMUSCULAR; INTRAVENOUS ONCE
Status: COMPLETED | OUTPATIENT
Start: 2023-10-23 | End: 2023-10-23

## 2023-10-23 RX ORDER — ONDANSETRON 4 MG/1
4 TABLET, ORALLY DISINTEGRATING ORAL EVERY 8 HOURS PRN
Qty: 12 TABLET | Refills: 0 | Status: SHIPPED | OUTPATIENT
Start: 2023-10-23

## 2023-10-23 RX ADMIN — ACETAMINOPHEN 1000 MG: 500 TABLET, FILM COATED ORAL at 08:52

## 2023-10-23 RX ADMIN — SODIUM CHLORIDE 1000 ML: 9 INJECTION, SOLUTION INTRAVENOUS at 08:50

## 2023-10-23 RX ADMIN — DIPHENHYDRAMINE HYDROCHLORIDE 25 MG: 50 INJECTION INTRAMUSCULAR; INTRAVENOUS at 08:51

## 2023-10-23 RX ADMIN — KETOROLAC TROMETHAMINE 30 MG: 30 INJECTION, SOLUTION INTRAMUSCULAR at 08:50

## 2023-10-23 RX ADMIN — METOCLOPRAMIDE 10 MG: 5 INJECTION, SOLUTION INTRAMUSCULAR; INTRAVENOUS at 08:53

## 2023-10-23 NOTE — Clinical Note
[facility] EMERGENCY DEPARTMENT  [address] [address]  Phone: [phone]    Terrie GONZALEZ was seen and treated in our emergency department on 10/23/2023.  She may return to work on 10/25/2023.         Thank you for choosing [facility].    Abbi Ruth PA-C

## 2023-10-23 NOTE — ED PROVIDER NOTES
Subjective:  Chief Complaint:  Headache, nausea    History of Present Illness:  Patient is a 23-year-old female with history of anxiety presenting to the ER with complaints of headache in the back of her head, photophobia, nausea, fever for over 24 hours.  Patient states that she did get spinal headaches after a previous pregnancy but otherwise denies history of migraines.  She has not had any medications today prior to arrival.  She reports that she had a fever of 103.8 at one-point this morning but has not taken any Tylenol or Motrin and appears to be fever free upon arrival.  She denies any chance of pregnancy.  She denies additional symptoms or complaints at this time.      Nurses Notes reviewed and agree, including vitals, allergies, social history and prior medical history.     REVIEW OF SYSTEMS: All systems reviewed and not pertinent unless noted.  Review of Systems   Constitutional:  Positive for fever.   Eyes:  Positive for photophobia.   Gastrointestinal:  Positive for nausea.   Neurological:  Positive for headaches.   All other systems reviewed and are negative.      Past Medical History:   Diagnosis Date    Anxiety        Allergies:    Penicillins      Past Surgical History:   Procedure Laterality Date    ADENOIDECTOMY       SECTION N/A 2021    Procedure:  SECTION PRIMARY;  Surgeon: Lavelle Ortega III, MD;  Location:  [facility];  Service: Obstetrics/Gynecology;  Laterality: N/A;     SECTION Bilateral 2022    Procedure:  SECTION REPEAT;  Surgeon: Ina Stephen MD;  Location:  [facility];  Service: Obstetrics;  Laterality: Bilateral;    TONSILLECTOMY      WISDOM TOOTH EXTRACTION           Social History     Socioeconomic History    Marital status:    Tobacco Use    Smoking status: Never    Smokeless tobacco: Never   Vaping Use    Vaping Use: Never used   Substance and Sexual Activity    Alcohol use: No    Drug use: No    Sexual  "activity: Yes     Partners: Male         Family History   Problem Relation Age of Onset    Interstitial cystitis Mother        Objective  Physical Exam:  /59   Pulse 117   Temp 98.6 °F (37 °C)   Resp 18   Ht 172.7 cm (68\")   Wt 90.7 kg (200 lb)   LMP 10/16/2023   SpO2 98%   Breastfeeding No   BMI 30.41 kg/m²      Physical Exam  Vitals and nursing note reviewed.   Constitutional:       General: She is not in acute distress.     Appearance: She is not toxic-appearing.   HENT:      Head: Normocephalic and atraumatic.      Right Ear: External ear normal.      Left Ear: External ear normal.      Nose: Nose normal.   Eyes:      Extraocular Movements: Extraocular movements intact.      Conjunctiva/sclera: Conjunctivae normal.      Pupils: Pupils are equal, round, and reactive to light.   Cardiovascular:      Rate and Rhythm: Tachycardia present.   Pulmonary:      Effort: Pulmonary effort is normal. No respiratory distress.   Abdominal:      General: There is no distension.   Musculoskeletal:         General: Normal range of motion.      Cervical back: Normal range of motion and neck supple. No rigidity.   Skin:     General: Skin is warm and dry.   Neurological:      General: No focal deficit present.      Mental Status: She is alert and oriented to person, place, and time.      Cranial Nerves: No cranial nerve deficit.   Psychiatric:         Mood and Affect: Mood normal.         Behavior: Behavior normal.         Procedures    ED Course:         Lab Results (last 24 hours)       Procedure Component Value Units Date/Time    CBC & Differential [[id number]]  (Abnormal) Collected: 10/23/23 0854    Specimen: Blood Updated: 10/23/23 0901    Narrative:      The following orders were created for panel order CBC & Differential.  Procedure                               Abnormality         Status                     ---------                               -----------         ------                     CBC Auto " Differential[[id number]]        Abnormal            Final result                 Please view results for these tests on the individual orders.    Comprehensive Metabolic Panel [[id number]]  (Abnormal) Collected: 10/23/23 0854    Specimen: Blood Updated: 10/23/23 0921     Glucose 155 mg/dL      BUN 6 mg/dL      Creatinine 0.70 mg/dL      Sodium 134 mmol/L      Potassium 3.6 mmol/L      Chloride 102 mmol/L      CO2 20.5 mmol/L      Calcium 8.9 mg/dL      Total Protein 7.2 g/dL      Albumin 4.4 g/dL      ALT (SGPT) 30 U/L      AST (SGOT) 26 U/L      Alkaline Phosphatase 81 U/L      Total Bilirubin 0.4 mg/dL      Globulin 2.8 gm/dL      A/G Ratio 1.6 g/dL      BUN/Creatinine Ratio 8.6     Anion Gap 11.5 mmol/L      eGFR 124.8 mL/min/1.73     Narrative:      GFR Normal >60  Chronic Kidney Disease <60  Kidney Failure <15      COVID-19 and FLU A/B PCR - Swab, Nasopharynx [[id number]]  (Normal) Collected: 10/23/23 0854    Specimen: Swab from Nasopharynx Updated: 10/23/23 0922     COVID19 Not Detected     Influenza A PCR Not Detected     Influenza B PCR Not Detected    Narrative:      Fact sheet for providers: https://www.fda.gov/media/420486/download    Fact sheet for patients: https://www.fda.gov/media/823762/download    Test performed by PCR.    CBC Auto Differential [[id number]]  (Abnormal) Collected: 10/23/23 0854    Specimen: Blood Updated: 10/23/23 0901     WBC 8.25 10*3/mm3      RBC 4.55 10*6/mm3      Hemoglobin 12.8 g/dL      Hematocrit 38.7 %      MCV 85.1 fL      MCH 28.1 pg      MCHC 33.1 g/dL      RDW 13.5 %      RDW-SD 42.6 fl      MPV 10.5 fL      Platelets 285 10*3/mm3      Neutrophil % 75.2 %      Lymphocyte % 18.2 %      Monocyte % 5.8 %      Eosinophil % 0.0 %      Basophil % 0.4 %      Immature Grans % 0.4 %      Neutrophils, Absolute 6.21 10*3/mm3      Lymphocytes, Absolute 1.50 10*3/mm3      Monocytes, Absolute 0.48 10*3/mm3      Eosinophils, Absolute 0.00 10*3/mm3      Basophils, Absolute 0.03 10*3/mm3       Immature Grans, Absolute 0.03 10*3/mm3      nRBC 0.0 /100 WBC              No radiology results from the last 24 hrs       MDM  Patient was evaluated in the ER for headache, intermittent fevers, nausea, photophobia x24 hours.  She is mildly tachycardic but otherwise hemodynamically stable, afebrile, nontoxic-appearing on exam.  No focal neurologic deficits.  Not on anticoagulants.  Differential diagnosis includes but is not limited to migraine, tension headache, viral illness, among others.  Initial plan includes CBC, CMP, COVID/flu swab, and treatment with IV fluids and migraine cocktail.  Will reassess after treatment.    Glucose slightly elevated at 155 this patient states she is fasting today and has not eaten since yesterday. Mild hyponatremia. Labs non-actionable.  Patient feeling much better after medications and is agreeable with plan for discharge.  She was given a work excuse and advised to rest in a dark room and avoid screens and loud noises.  Prescription was given for Zofran.  Advised Excedrin Migraine per directions on the package as needed.  Recommended follow-up with PCP for further outpatient evaluation if symptoms persist.  Precautions were given for return to the ER for any new or worsening symptoms.    Final diagnoses:   Migraine without status migrainosus, not intractable, unspecified migraine type          Abbi Ruth PA-C  10/23/23 [id number]

## 2023-10-23 NOTE — DISCHARGE INSTRUCTIONS
Rest in a dark room.  Avoid screens and loud noises.  Drink plenty of water.  Take Zofran as prescribed as needed for nausea and vomiting.  Take Excedrin Migraine per directions on the package as needed.  Follow-up with your PCP for further outpatient evaluation if symptoms persist.  Return to the ER for new or worsening symptoms or acute concerns.

## 2024-11-12 ENCOUNTER — HOSPITAL ENCOUNTER (EMERGENCY)
Facility: HOSPITAL | Age: 24
Discharge: HOME | End: 2024-11-12
Attending: STUDENT IN AN ORGANIZED HEALTH CARE EDUCATION/TRAINING PROGRAM | Admitting: STUDENT IN AN ORGANIZED HEALTH CARE EDUCATION/TRAINING PROGRAM
Payer: COMMERCIAL

## 2024-11-12 ENCOUNTER — APPOINTMENT (OUTPATIENT)
Dept: CT IMAGING | Facility: HOSPITAL | Age: 24
End: 2024-11-12
Payer: COMMERCIAL

## 2024-11-12 VITALS
DIASTOLIC BLOOD PRESSURE: 65 MMHG | WEIGHT: 200 LBS | TEMPERATURE: 97.9 F | RESPIRATION RATE: 20 BRPM | SYSTOLIC BLOOD PRESSURE: 111 MMHG | HEART RATE: 96 BPM | BODY MASS INDEX: 30.31 KG/M2 | OXYGEN SATURATION: 92 % | HEIGHT: 68 IN

## 2024-11-12 DIAGNOSIS — R10.9 ABDOMINAL PAIN, UNSPECIFIED ABDOMINAL LOCATION: Primary | ICD-10-CM

## 2024-11-12 LAB
ALBUMIN SERPL-MCNC: 4.4 G/DL (ref 3.5–5.2)
ALBUMIN/GLOB SERPL: 1.6 G/DL
ALP SERPL-CCNC: 85 U/L (ref 39–117)
ALT SERPL W P-5'-P-CCNC: 18 U/L (ref 1–33)
ANION GAP SERPL CALCULATED.3IONS-SCNC: 13.4 MMOL/L (ref 5–15)
AST SERPL-CCNC: 19 U/L (ref 1–32)
B-HCG UR QL: NEGATIVE
BACTERIA UR QL AUTO: ABNORMAL /HPF
BASOPHILS # BLD AUTO: 0.06 10*3/MM3 (ref 0–0.2)
BASOPHILS NFR BLD AUTO: 0.6 % (ref 0–1.5)
BILIRUB SERPL-MCNC: 0.4 MG/DL (ref 0–1.2)
BILIRUB UR QL STRIP: NEGATIVE
BUN SERPL-MCNC: 8 MG/DL (ref 6–20)
BUN/CREAT SERPL: 11.3 (ref 7–25)
CALCIUM SPEC-SCNC: 8.9 MG/DL (ref 8.6–10.5)
CHLORIDE SERPL-SCNC: 107 MMOL/L (ref 98–107)
CLARITY UR: ABNORMAL
CO2 SERPL-SCNC: 20.6 MMOL/L (ref 22–29)
COD CRY URNS QL: ABNORMAL /HPF
COLOR UR: YELLOW
CREAT SERPL-MCNC: 0.71 MG/DL (ref 0.57–1)
DEPRECATED RDW RBC AUTO: 42.6 FL (ref 37–54)
EGFRCR SERPLBLD CKD-EPI 2021: 121.9 ML/MIN/1.73
EOSINOPHIL # BLD AUTO: 0.07 10*3/MM3 (ref 0–0.4)
EOSINOPHIL NFR BLD AUTO: 0.7 % (ref 0.3–6.2)
ERYTHROCYTE [DISTWIDTH] IN BLOOD BY AUTOMATED COUNT: 13.2 % (ref 12.3–15.4)
GLOBULIN UR ELPH-MCNC: 2.8 GM/DL
GLUCOSE SERPL-MCNC: 106 MG/DL (ref 65–99)
GLUCOSE UR STRIP-MCNC: NEGATIVE MG/DL
HCT VFR BLD AUTO: 42.1 % (ref 34–46.6)
HGB BLD-MCNC: 13.6 G/DL (ref 12–15.9)
HGB UR QL STRIP.AUTO: NEGATIVE
HOLD SPECIMEN: NORMAL
HOLD SPECIMEN: NORMAL
HYALINE CASTS UR QL AUTO: ABNORMAL /LPF
IMM GRANULOCYTES # BLD AUTO: 0.05 10*3/MM3 (ref 0–0.05)
IMM GRANULOCYTES NFR BLD AUTO: 0.5 % (ref 0–0.5)
KETONES UR QL STRIP: NEGATIVE
LEUKOCYTE ESTERASE UR QL STRIP.AUTO: ABNORMAL
LIPASE SERPL-CCNC: 31 U/L (ref 13–60)
LYMPHOCYTES # BLD AUTO: 4.09 10*3/MM3 (ref 0.7–3.1)
LYMPHOCYTES NFR BLD AUTO: 38.5 % (ref 19.6–45.3)
MCH RBC QN AUTO: 28.4 PG (ref 26.6–33)
MCHC RBC AUTO-ENTMCNC: 32.3 G/DL (ref 31.5–35.7)
MCV RBC AUTO: 87.9 FL (ref 79–97)
MONOCYTES # BLD AUTO: 0.43 10*3/MM3 (ref 0.1–0.9)
MONOCYTES NFR BLD AUTO: 4 % (ref 5–12)
MUCOUS THREADS URNS QL MICRO: ABNORMAL /HPF
NEUTROPHILS NFR BLD AUTO: 5.93 10*3/MM3 (ref 1.7–7)
NEUTROPHILS NFR BLD AUTO: 55.7 % (ref 42.7–76)
NITRITE UR QL STRIP: NEGATIVE
NRBC BLD AUTO-RTO: 0 /100 WBC (ref 0–0.2)
PH UR STRIP.AUTO: 8.5 [PH] (ref 5–8)
PLATELET # BLD AUTO: 364 10*3/MM3 (ref 140–450)
PMV BLD AUTO: 10.9 FL (ref 6–12)
POTASSIUM SERPL-SCNC: 3.4 MMOL/L (ref 3.5–5.2)
PROT SERPL-MCNC: 7.2 G/DL (ref 6–8.5)
PROT UR QL STRIP: ABNORMAL
RBC # BLD AUTO: 4.79 10*6/MM3 (ref 3.77–5.28)
RBC # UR STRIP: ABNORMAL /HPF
REF LAB TEST METHOD: ABNORMAL
SODIUM SERPL-SCNC: 141 MMOL/L (ref 136–145)
SP GR UR STRIP: 1.02 (ref 1–1.03)
SQUAMOUS #/AREA URNS HPF: ABNORMAL /HPF
UROBILINOGEN UR QL STRIP: ABNORMAL
WBC # UR STRIP: ABNORMAL /HPF
WBC NRBC COR # BLD AUTO: 10.63 10*3/MM3 (ref 3.4–10.8)
WHOLE BLOOD HOLD COAG: NORMAL
WHOLE BLOOD HOLD SPECIMEN: NORMAL

## 2024-11-12 PROCEDURE — 87086 URINE CULTURE/COLONY COUNT: CPT

## 2024-11-12 PROCEDURE — 36415 COLL VENOUS BLD VENIPUNCTURE: CPT

## 2024-11-12 PROCEDURE — 81001 URINALYSIS AUTO W/SCOPE: CPT

## 2024-11-12 PROCEDURE — 25510000001 IOPAMIDOL 61 % SOLUTION: Performed by: STUDENT IN AN ORGANIZED HEALTH CARE EDUCATION/TRAINING PROGRAM

## 2024-11-12 PROCEDURE — 80053 COMPREHEN METABOLIC PANEL: CPT | Performed by: STUDENT IN AN ORGANIZED HEALTH CARE EDUCATION/TRAINING PROGRAM

## 2024-11-12 PROCEDURE — 25010000002 KETOROLAC TROMETHAMINE PER 15 MG

## 2024-11-12 PROCEDURE — 85025 COMPLETE CBC W/AUTO DIFF WBC: CPT | Performed by: STUDENT IN AN ORGANIZED HEALTH CARE EDUCATION/TRAINING PROGRAM

## 2024-11-12 PROCEDURE — 99285 EMERGENCY DEPT VISIT HI MDM: CPT | Performed by: STUDENT IN AN ORGANIZED HEALTH CARE EDUCATION/TRAINING PROGRAM

## 2024-11-12 PROCEDURE — 83690 ASSAY OF LIPASE: CPT | Performed by: STUDENT IN AN ORGANIZED HEALTH CARE EDUCATION/TRAINING PROGRAM

## 2024-11-12 PROCEDURE — 96375 TX/PRO/DX INJ NEW DRUG ADDON: CPT

## 2024-11-12 PROCEDURE — 25010000002 ONDANSETRON PER 1 MG

## 2024-11-12 PROCEDURE — 74177 CT ABD & PELVIS W/CONTRAST: CPT

## 2024-11-12 PROCEDURE — 81025 URINE PREGNANCY TEST: CPT

## 2024-11-12 PROCEDURE — 96374 THER/PROPH/DIAG INJ IV PUSH: CPT

## 2024-11-12 RX ORDER — ONDANSETRON 2 MG/ML
4 INJECTION INTRAMUSCULAR; INTRAVENOUS ONCE
Status: COMPLETED | OUTPATIENT
Start: 2024-11-12 | End: 2024-11-12

## 2024-11-12 RX ORDER — IOPAMIDOL 612 MG/ML
100 INJECTION, SOLUTION INTRAVASCULAR
Status: COMPLETED | OUTPATIENT
Start: 2024-11-12 | End: 2024-11-12

## 2024-11-12 RX ORDER — SODIUM CHLORIDE 0.9 % (FLUSH) 0.9 %
10 SYRINGE (ML) INJECTION AS NEEDED
Status: DISCONTINUED | OUTPATIENT
Start: 2024-11-12 | End: 2024-11-12 | Stop reason: HOSPADM

## 2024-11-12 RX ORDER — KETOROLAC TROMETHAMINE 30 MG/ML
15 INJECTION, SOLUTION INTRAMUSCULAR; INTRAVENOUS ONCE
Status: COMPLETED | OUTPATIENT
Start: 2024-11-12 | End: 2024-11-12

## 2024-11-12 RX ORDER — ONDANSETRON 4 MG/1
4 TABLET, ORALLY DISINTEGRATING ORAL EVERY 8 HOURS PRN
Qty: 12 TABLET | Refills: 0 | Status: SHIPPED | OUTPATIENT
Start: 2024-11-12

## 2024-11-12 RX ORDER — NAPROXEN 500 MG/1
500 TABLET ORAL 2 TIMES DAILY PRN
Qty: 30 TABLET | Refills: 0 | Status: SHIPPED | OUTPATIENT
Start: 2024-11-12

## 2024-11-12 RX ADMIN — KETOROLAC TROMETHAMINE 15 MG: 30 INJECTION, SOLUTION INTRAMUSCULAR; INTRAVENOUS at 11:24

## 2024-11-12 RX ADMIN — ONDANSETRON 4 MG: 2 INJECTION INTRAMUSCULAR; INTRAVENOUS at 11:24

## 2024-11-12 RX ADMIN — IOPAMIDOL 100 ML: 612 INJECTION, SOLUTION INTRAVENOUS at 12:24

## 2024-11-12 NOTE — ED PROVIDER NOTES
Subjective  History of Present Illness:    Terrie is a 24-year-old female presented for evaluation of right lower quadrant abdominal pain.  Patient reports onset this morning, has been constant in nature, described as a sharp pain to the right lower quadrant.  She has a history of 2 prior  sections.  She denies any pelvic pain, no vaginal bleeding, no dysuria hematuria urgency or frequency.  Reports mild nausea but no vomiting.  No known fevers.  No chest pain no shortness of air.  No known trauma or injuries.  Pain rated 8 out of 10.  No diarrhea.  Normal bowel movements.      Nurses Notes reviewed and agree, including vitals, allergies, social history and prior medical history.     REVIEW OF SYSTEMS: All systems reviewed and not pertinent unless noted.  Review of Systems   Constitutional:  Negative for fever.   Respiratory:  Negative for shortness of breath.    Cardiovascular:  Negative for chest pain.   Gastrointestinal:  Positive for abdominal pain and nausea. Negative for blood in stool, constipation, diarrhea and vomiting.   Genitourinary:  Negative for dysuria, flank pain, frequency, hematuria, pelvic pain, urgency, vaginal bleeding and vaginal discharge.   All other systems reviewed and are negative.      Past Medical History:   Diagnosis Date    Anxiety        Allergies:    Penicillins      Past Surgical History:   Procedure Laterality Date    ADENOIDECTOMY       SECTION N/A 2021    Procedure:  SECTION PRIMARY;  Surgeon: Lavelle Ortega III, MD;  Location: [facility] LABOR DELIVERY;  Service: Obstetrics/Gynecology;  Laterality: N/A;     SECTION Bilateral 2022    Procedure:  SECTION REPEAT;  Surgeon: Ina Stephen MD;  Location: [facility] LABOR DELIVERY;  Service: Obstetrics;  Laterality: Bilateral;    TONSILLECTOMY      WISDOM TOOTH EXTRACTION           Social History     Socioeconomic History    Marital status:    Tobacco Use    Smoking status: Never  "   Smokeless tobacco: Never   Vaping Use    Vaping status: Never Used   Substance and Sexual Activity    Alcohol use: No    Drug use: No    Sexual activity: Yes     Partners: Male         Family History   Problem Relation Age of Onset    Interstitial cystitis Mother        Objective  Physical Exam:  /61   Pulse 92   Temp 97.9 °F (36.6 °C) (Oral)   Resp 18   Ht 172.7 cm (68\")   Wt 90.7 kg (200 lb)   LMP 11/05/2024   SpO2 99%   BMI 30.41 kg/m²      Physical Exam  Vitals and nursing note reviewed.   Constitutional:       General: She is not in acute distress.     Appearance: She is well-developed. She is not ill-appearing, toxic-appearing or diaphoretic.   HENT:      Head: Normocephalic and atraumatic.      Mouth/Throat:      Mouth: Mucous membranes are moist.      Pharynx: Oropharynx is clear.   Eyes:      Extraocular Movements: Extraocular movements intact.   Cardiovascular:      Heart sounds: Normal heart sounds.      Comments: Appears well-perfused  Pulmonary:      Effort: Pulmonary effort is normal.   Abdominal:      Palpations: Abdomen is soft.      Tenderness: There is abdominal tenderness in the right lower quadrant. There is no guarding. Positive signs include McBurney's sign.   Skin:     General: Skin is warm and dry.      Capillary Refill: Capillary refill takes less than 2 seconds.   Neurological:      General: No focal deficit present.      Mental Status: She is alert.   Psychiatric:         Mood and Affect: Mood normal.         Behavior: Behavior normal.               Procedures    ED Course:    ED Course as of 11/12/24 1253   Tue Nov 12, 2024   1152 I have reviewed the mid-level provider(s) note and verbally discussed the case/plan of care.  I was available for consultation as needed at all times during the patient's visit in the Emergency Department.  I agree with the clinical impression, plan, and disposition unless otherwise stated in the MDM below.    ATTENDING ATTESTATION  HPI: " 24-year-old female presents with right lower quadrant abdominal pain that started this morning.  No other associated symptoms.    MDM: ED workup reviewed.    I have reviewed the labs results.  Clinically unremarkable.    Radiology reports reviewed.     CT Abdomen Pelvis With Contrast    Result Date: 11/12/2024  Mesenteric adenitis.    CTDI: 8.31 mGy DLP:409.43 mGy.cm  This report was signed and finalized on 11/12/2024 12:30 PM by Kady Foster MD.       [JS]      ED Course User Index  [JS] Truman Barker DO       Lab Results (last 24 hours)       Procedure Component Value Units Date/Time    Urinalysis With Culture If Indicated - Urine, Clean Catch [[id number]]  (Abnormal) Collected: 11/12/24 1124    Specimen: Urine, Clean Catch Updated: 11/12/24 1136     Color, UA Yellow     Appearance, UA Cloudy     pH, UA 8.5     Specific Gravity, UA 1.022     Glucose, UA Negative     Ketones, UA Negative     Bilirubin, UA Negative     Blood, UA Negative     Protein, UA Trace     Leuk Esterase, UA Small (1+)     Nitrite, UA Negative     Urobilinogen, UA 1.0 E.U./dL    Narrative:      In absence of clinical symptoms, the presence of pyuria, bacteria, and/or nitrites on the urinalysis result does not correlate with infection.    Pregnancy, Urine - Urine, Clean Catch [[id number]]  (Normal) Collected: 11/12/24 1124    Specimen: Urine, Clean Catch Updated: 11/12/24 1137     HCG, Urine QL Negative    Urinalysis, Microscopic Only - Urine, Clean Catch [[id number]]  (Abnormal) Collected: 11/12/24 1124    Specimen: Urine, Clean Catch Updated: 11/12/24 1205     RBC, UA 0-2 /HPF      WBC, UA 11-20 /HPF      Bacteria, UA 1+ /HPF      Squamous Epithelial Cells, UA 21-30 /HPF      Hyaline Casts, UA None Seen /LPF      Calcium Oxalate Crystals, UA Small/1+ /HPF      Mucus, UA Small/1+ /HPF      Methodology Manual Light Microscopy    Urine Culture - Urine, Urine, Clean Catch [[id number]] Collected: 11/12/24 1124    Specimen: Urine, Clean Catch  Updated: 11/12/24 1205    CBC & Differential [[id number]]  (Abnormal) Collected: 11/12/24 1130    Specimen: Blood Updated: 11/12/24 1148    Narrative:      The following orders were created for panel order CBC & Differential.  Procedure                               Abnormality         Status                     ---------                               -----------         ------                     CBC Auto Differential[[id number]]        Abnormal            Final result                 Please view results for these tests on the individual orders.    Comprehensive Metabolic Panel [[id number]]  (Abnormal) Collected: 11/12/24 1130    Specimen: Blood Updated: 11/12/24 1154     Glucose 106 mg/dL      BUN 8 mg/dL      Creatinine 0.71 mg/dL      Sodium 141 mmol/L      Potassium 3.4 mmol/L      Chloride 107 mmol/L      CO2 20.6 mmol/L      Calcium 8.9 mg/dL      Total Protein 7.2 g/dL      Albumin 4.4 g/dL      ALT (SGPT) 18 U/L      AST (SGOT) 19 U/L      Alkaline Phosphatase 85 U/L      Total Bilirubin 0.4 mg/dL      Globulin 2.8 gm/dL      A/G Ratio 1.6 g/dL      BUN/Creatinine Ratio 11.3     Anion Gap 13.4 mmol/L      eGFR 121.9 mL/min/1.73     Narrative:      GFR Normal >60  Chronic Kidney Disease <60  Kidney Failure <15      Lipase [[id number]]  (Normal) Collected: 11/12/24 1130    Specimen: Blood Updated: 11/12/24 1154     Lipase 31 U/L     CBC Auto Differential [[id number]]  (Abnormal) Collected: 11/12/24 1130    Specimen: Blood Updated: 11/12/24 1148     WBC 10.63 10*3/mm3      RBC 4.79 10*6/mm3      Hemoglobin 13.6 g/dL      Hematocrit 42.1 %      MCV 87.9 fL      MCH 28.4 pg      MCHC 32.3 g/dL      RDW 13.2 %      RDW-SD 42.6 fl      MPV 10.9 fL      Platelets 364 10*3/mm3      Neutrophil % 55.7 %      Lymphocyte % 38.5 %      Monocyte % 4.0 %      Eosinophil % 0.7 %      Basophil % 0.6 %      Immature Grans % 0.5 %      Neutrophils, Absolute 5.93 10*3/mm3      Lymphocytes, Absolute 4.09 10*3/mm3      Monocytes,  Absolute 0.43 10*3/mm3      Eosinophils, Absolute 0.07 10*3/mm3      Basophils, Absolute 0.06 10*3/mm3      Immature Grans, Absolute 0.05 10*3/mm3      nRBC 0.0 /100 WBC              CT Abdomen Pelvis With Contrast    Result Date: 11/12/2024  PROCEDURE: CT ABDOMEN PELVIS W CONTRAST-  HISTORY: Right lower quadrant abdominal pain, McBurney's point tenderness, rule out appendicitis  COMPARISON: None.  PROCEDURE: The patient was injected with IV contrast. Axial images were obtained from the lung bases to the pubic symphysis by computed tomography. This study was performed with techniques to keep radiation doses as low as reasonably achievable, (ALARA). Individualized dose reduction techniques using automated exposure control or adjustment of mA and/or kV according to the patient size were employed.  FINDINGS:  ABDOMEN: The lung bases are clear. The heart is proper size. The liver is homogenous with no focal abnormality. Gallbladder present with no CT visible stones. The spleen is unremarkable. No adrenal mass is present. The pancreas is unremarkable. The kidneys are unremarkable, without evidence of mass or hydronephrosis. The aorta is proper caliber. There is no free fluid or adenopathy.  PELVIS: The GI tract demonstrates no obstruction.  The appendix is not identified. There are several mesenteric lymph nodes in the right lower quadrant suggesting mesenteric adenitis. Uterus is midline. The urinary bladder is unremarkable. There is no free fluid, adenopathy, or inflammatory process.      Impression: Mesenteric adenitis.    CTDI: 8.31 mGy DLP:409.43 mGy.cm  This report was signed and finalized on 11/12/2024 12:30 PM by Kady Foster MD.          MDM      Initial impression of presenting illness: 24-year-old female presents for evaluation of right lower quadrant abdominal pain    DDX: includes but is not limited to: Colitis, diverticulitis, gastroenteritis, appendicitis, mesenteric adenitis, viral GI illness, ovarian  cyst, cystitis, others    Patient arrives hemodynamically stable afebrile nontachycardic nontachypneic nonhypoxic on room air with vitals interpreted by myself.     Pertinent features from physical exam: Patient has tenderness palpation localized to the right lower quadrant without peritonitis.  Tenderness to palpation McBurney's point..  Alert and orient x 4, no acute distress    Initial diagnostic plan: CBC CMP urinalysis urine pregnancy lipase CT abdomen pelvis with contrast    Results from initial plan were reviewed and interpreted by me revealing CMP with a glucose of 106 potassium of 3.4.  Lipase is normal, CBC is unremarkable.  Urinalysis is significantly contaminated with up to 30 squamous cells, given this, low concern for infection, she had no urinary symptoms, will send urine culture.  Her hCG was negative.  CT suggestive of mesenteric adenitis per radiology.    Diagnostic information from other sources: Record reviewed    Interventions / Re-evaluation: Toradol, Zofran.  Patient is feeling much better at this time and ready for discharge.    Results/clinical rationale were discussed with patient at bedside.  Discussed that appendix was not seen on imaging however her white count is normal, her symptoms improved with treatment.  Did discuss if she develops fevers, worsening symptoms to return for consideration of rescan and reevaluation.  She was comfortable and agreeable to this plan.    Consultations/Discussion of results with other physicians: Discussed with ED attending physician    Disposition plan: Discharge.  Follow-up closely with primary care physician.  Strict return precautions discussed for fevers, worsening abdominal discomfort.  Sent naproxen and Zofran, discussed that mesenteric adenitis is self-limiting.  Did give strict return precautions.  -----    Final diagnoses:   Abdominal pain, unspecified abdominal location          Philip Brito PA-C  11/12/24 1381

## 2024-11-12 NOTE — Clinical Note
[facility] EMERGENCY DEPARTMENT  [address] [address]  Phone: [phone]    Terrie GONZALEZ was seen and treated in our emergency department on 11/12/2024.  She may return to work on 11/18/2024.         Thank you for choosing [facility].    Philip Brito PA-C

## 2024-11-12 NOTE — DISCHARGE INSTRUCTIONS
Return for any worsening symptoms, follow-up with your primary care physician, if you develop fevers, worsening abdominal discomfort, inability tolerate anything by mouth please return for reevaluation to consider another CT image/take anti-inflammatories as directed and as needed, make sure to drink plenty of fluids and get plenty of rest.

## 2024-11-13 LAB — BACTERIA SPEC AEROBE CULT: NORMAL

## 2025-06-15 ENCOUNTER — HOSPITAL ENCOUNTER (EMERGENCY)
Facility: HOSPITAL | Age: 25
Discharge: HOME | End: 2025-06-15
Attending: EMERGENCY MEDICINE | Admitting: EMERGENCY MEDICINE
Payer: COMMERCIAL

## 2025-06-15 ENCOUNTER — APPOINTMENT (OUTPATIENT)
Dept: CT IMAGING | Facility: HOSPITAL | Age: 25
End: 2025-06-15
Payer: COMMERCIAL

## 2025-06-15 VITALS
DIASTOLIC BLOOD PRESSURE: 84 MMHG | WEIGHT: 200 LBS | TEMPERATURE: 98.1 F | HEART RATE: 108 BPM | SYSTOLIC BLOOD PRESSURE: 124 MMHG | RESPIRATION RATE: 15 BRPM | HEIGHT: 68 IN | OXYGEN SATURATION: 98 % | BODY MASS INDEX: 30.31 KG/M2

## 2025-06-15 DIAGNOSIS — N20.0 RIGHT NEPHROLITHIASIS: ICD-10-CM

## 2025-06-15 DIAGNOSIS — N00.9: Primary | ICD-10-CM

## 2025-06-15 LAB
ALBUMIN SERPL-MCNC: 4.6 G/DL (ref 3.5–5.2)
ALBUMIN/GLOB SERPL: 1.4 G/DL
ALP SERPL-CCNC: 81 U/L (ref 39–117)
ALT SERPL W P-5'-P-CCNC: 18 U/L (ref 1–33)
ANION GAP SERPL CALCULATED.3IONS-SCNC: 15.3 MMOL/L (ref 5–15)
AST SERPL-CCNC: 17 U/L (ref 1–32)
BACTERIA UR QL AUTO: ABNORMAL /HPF
BASOPHILS # BLD AUTO: 0.06 10*3/MM3 (ref 0–0.2)
BASOPHILS NFR BLD AUTO: 0.3 % (ref 0–1.5)
BILIRUB SERPL-MCNC: 0.9 MG/DL (ref 0–1.2)
BILIRUB UR QL STRIP: NEGATIVE
BUN SERPL-MCNC: 9 MG/DL (ref 6–20)
BUN/CREAT SERPL: 11.5 (ref 7–25)
CALCIUM SPEC-SCNC: 9.3 MG/DL (ref 8.6–10.5)
CHLORIDE SERPL-SCNC: 100 MMOL/L (ref 98–107)
CLARITY UR: ABNORMAL
CO2 SERPL-SCNC: 18.7 MMOL/L (ref 22–29)
COLOR UR: YELLOW
CREAT SERPL-MCNC: 0.78 MG/DL (ref 0.57–1)
DEPRECATED RDW RBC AUTO: 41.7 FL (ref 37–54)
EGFRCR SERPLBLD CKD-EPI 2021: 108.3 ML/MIN/1.73
EOSINOPHIL # BLD AUTO: 0 10*3/MM3 (ref 0–0.4)
EOSINOPHIL NFR BLD AUTO: 0 % (ref 0.3–6.2)
ERYTHROCYTE [DISTWIDTH] IN BLOOD BY AUTOMATED COUNT: 13.4 % (ref 12.3–15.4)
GLOBULIN UR ELPH-MCNC: 3.3 GM/DL
GLUCOSE SERPL-MCNC: 109 MG/DL (ref 65–99)
GLUCOSE UR STRIP-MCNC: NEGATIVE MG/DL
HCT VFR BLD AUTO: 39.5 % (ref 34–46.6)
HGB BLD-MCNC: 13.5 G/DL (ref 12–15.9)
HGB UR QL STRIP.AUTO: ABNORMAL
HOLD SPECIMEN: NORMAL
HOLD SPECIMEN: NORMAL
HYALINE CASTS UR QL AUTO: ABNORMAL /LPF
IMM GRANULOCYTES # BLD AUTO: 0.11 10*3/MM3 (ref 0–0.05)
IMM GRANULOCYTES NFR BLD AUTO: 0.6 % (ref 0–0.5)
KETONES UR QL STRIP: ABNORMAL
LEUKOCYTE ESTERASE UR QL STRIP.AUTO: ABNORMAL
LIPASE SERPL-CCNC: 30 U/L (ref 13–60)
LYMPHOCYTES # BLD AUTO: 2.88 10*3/MM3 (ref 0.7–3.1)
LYMPHOCYTES NFR BLD AUTO: 14.9 % (ref 19.6–45.3)
MCH RBC QN AUTO: 28.7 PG (ref 26.6–33)
MCHC RBC AUTO-ENTMCNC: 34.2 G/DL (ref 31.5–35.7)
MCV RBC AUTO: 84 FL (ref 79–97)
MONOCYTES # BLD AUTO: 1.49 10*3/MM3 (ref 0.1–0.9)
MONOCYTES NFR BLD AUTO: 7.7 % (ref 5–12)
MUCOUS THREADS URNS QL MICRO: ABNORMAL /HPF
NEUTROPHILS NFR BLD AUTO: 14.73 10*3/MM3 (ref 1.7–7)
NEUTROPHILS NFR BLD AUTO: 76.5 % (ref 42.7–76)
NITRITE UR QL STRIP: NEGATIVE
NRBC BLD AUTO-RTO: 0 /100 WBC (ref 0–0.2)
PH UR STRIP.AUTO: 6 [PH] (ref 5–8)
PLATELET # BLD AUTO: 338 10*3/MM3 (ref 140–450)
PMV BLD AUTO: 10.4 FL (ref 6–12)
POTASSIUM SERPL-SCNC: 3.6 MMOL/L (ref 3.5–5.2)
PROT SERPL-MCNC: 7.9 G/DL (ref 6–8.5)
PROT UR QL STRIP: ABNORMAL
RBC # BLD AUTO: 4.7 10*6/MM3 (ref 3.77–5.28)
RBC # UR STRIP: ABNORMAL /HPF
REF LAB TEST METHOD: ABNORMAL
SODIUM SERPL-SCNC: 134 MMOL/L (ref 136–145)
SP GR UR STRIP: 1.03 (ref 1–1.03)
SQUAMOUS #/AREA URNS HPF: ABNORMAL /HPF
UROBILINOGEN UR QL STRIP: ABNORMAL
WBC # UR STRIP: ABNORMAL /HPF
WBC NRBC COR # BLD AUTO: 19.27 10*3/MM3 (ref 3.4–10.8)
WHOLE BLOOD HOLD COAG: NORMAL
WHOLE BLOOD HOLD SPECIMEN: NORMAL

## 2025-06-15 PROCEDURE — 87077 CULTURE AEROBIC IDENTIFY: CPT | Performed by: EMERGENCY MEDICINE

## 2025-06-15 PROCEDURE — 85025 COMPLETE CBC W/AUTO DIFF WBC: CPT | Performed by: EMERGENCY MEDICINE

## 2025-06-15 PROCEDURE — 99285 EMERGENCY DEPT VISIT HI MDM: CPT | Performed by: EMERGENCY MEDICINE

## 2025-06-15 PROCEDURE — 25010000002 KETOROLAC TROMETHAMINE PER 15 MG: Performed by: EMERGENCY MEDICINE

## 2025-06-15 PROCEDURE — 96365 THER/PROPH/DIAG IV INF INIT: CPT

## 2025-06-15 PROCEDURE — 25010000002 CEFTRIAXONE PER 250 MG: Performed by: EMERGENCY MEDICINE

## 2025-06-15 PROCEDURE — 81001 URINALYSIS AUTO W/SCOPE: CPT | Performed by: EMERGENCY MEDICINE

## 2025-06-15 PROCEDURE — 25810000003 SODIUM CHLORIDE 0.9 % SOLUTION: Performed by: EMERGENCY MEDICINE

## 2025-06-15 PROCEDURE — 80053 COMPREHEN METABOLIC PANEL: CPT | Performed by: EMERGENCY MEDICINE

## 2025-06-15 PROCEDURE — 83690 ASSAY OF LIPASE: CPT | Performed by: EMERGENCY MEDICINE

## 2025-06-15 PROCEDURE — 25510000001 IOPAMIDOL 61 % SOLUTION: Performed by: EMERGENCY MEDICINE

## 2025-06-15 PROCEDURE — 74177 CT ABD & PELVIS W/CONTRAST: CPT

## 2025-06-15 PROCEDURE — 96375 TX/PRO/DX INJ NEW DRUG ADDON: CPT

## 2025-06-15 PROCEDURE — 87186 SC STD MICRODIL/AGAR DIL: CPT | Performed by: EMERGENCY MEDICINE

## 2025-06-15 PROCEDURE — 87086 URINE CULTURE/COLONY COUNT: CPT | Performed by: EMERGENCY MEDICINE

## 2025-06-15 RX ORDER — IBUPROFEN 600 MG/1
600 TABLET, FILM COATED ORAL 3 TIMES DAILY
Qty: 15 TABLET | Refills: 0 | Status: SHIPPED | OUTPATIENT
Start: 2025-06-15

## 2025-06-15 RX ORDER — IOPAMIDOL 612 MG/ML
100 INJECTION, SOLUTION INTRAVASCULAR
Status: COMPLETED | OUTPATIENT
Start: 2025-06-15 | End: 2025-06-15

## 2025-06-15 RX ORDER — CEPHALEXIN 500 MG/1
500 CAPSULE ORAL 4 TIMES DAILY
Qty: 40 CAPSULE | Refills: 0 | Status: SHIPPED | OUTPATIENT
Start: 2025-06-15 | End: 2025-06-19

## 2025-06-15 RX ORDER — KETOROLAC TROMETHAMINE 30 MG/ML
30 INJECTION, SOLUTION INTRAMUSCULAR; INTRAVENOUS ONCE
Status: COMPLETED | OUTPATIENT
Start: 2025-06-15 | End: 2025-06-15

## 2025-06-15 RX ORDER — SODIUM CHLORIDE 0.9 % (FLUSH) 0.9 %
10 SYRINGE (ML) INJECTION AS NEEDED
Status: DISCONTINUED | OUTPATIENT
Start: 2025-06-15 | End: 2025-06-16 | Stop reason: HOSPADM

## 2025-06-15 RX ADMIN — SODIUM CHLORIDE 1000 ML: 9 INJECTION, SOLUTION INTRAVENOUS at 22:04

## 2025-06-15 RX ADMIN — CEFTRIAXONE 1000 MG: 1 INJECTION, POWDER, FOR SOLUTION INTRAMUSCULAR; INTRAVENOUS at 22:05

## 2025-06-15 RX ADMIN — IOPAMIDOL 100 ML: 612 INJECTION, SOLUTION INTRAVENOUS at 21:31

## 2025-06-15 RX ADMIN — KETOROLAC TROMETHAMINE 30 MG: 30 INJECTION, SOLUTION INTRAMUSCULAR; INTRAVENOUS at 21:06

## 2025-06-15 NOTE — Clinical Note
[facility] EMERGENCY DEPARTMENT  [address] [address]  Phone: [phone]    Terrie Menjivar was seen and treated in our emergency department on 6/15/2025.  She may return to work on 06/13/2025.         Thank you for choosing [facility].    Jonny Albarado, DO

## 2025-06-15 NOTE — Clinical Note
[facility] EMERGENCY DEPARTMENT  [address] [address]  Phone: [phone]    Terrie Menjivar was seen and treated in our emergency department on 6/15/2025.  She may return to work on 06/17/2025.         Thank you for choosing [facility].    Jonny Albarado, DO

## 2025-06-16 NOTE — ED PROVIDER NOTES
[facility] EMERGENCY DEPARTMENT  Emergency Department Encounter  Emergency Medicine Physician Note     Pt Name:Terrie Menjivar  MRN: [id number]  Birthdate 2000  Date of evaluation: 6/15/2025  PCP:  Anastacia Salcido APRN  Note Started: 8:08 PM EDT      CHIEF COMPLAINT       Chief Complaint   Patient presents with    Flank Pain       HISTORY OF PRESENT ILLNESS  (Location/Symptom, Timing/Onset, Context/Setting, Quality, Duration, Modifying Factors, Severity.)      Terrie Menjivar is a 25 y.o. female who presents with right-sided flank pain.  Patient describes the pain as pain radiating down towards the groin and towards pelvis.  Patient denies any specific fevers or chills nausea vomiting.  Patient thinks that she has a kidney stone.  Patient denies any lightheadedness or dizziness.  Patient did have a fever of 102, patient did take medication for this.  Patient describes generalized chills.  Patient denies any change in bowel habits vaginal pain or discharge.    PAST MEDICAL / SURGICAL / SOCIAL / FAMILY HISTORY     Past Medical History:   Diagnosis Date    Anxiety      No additional pertinent       Past Surgical History:   Procedure Laterality Date    ADENOIDECTOMY       SECTION N/A 2021    Procedure:  SECTION PRIMARY;  Surgeon: Lavelle Ortega III, MD;  Location: [facility] LABOR DELIVERY;  Service: Obstetrics/Gynecology;  Laterality: N/A;     SECTION Bilateral 2022    Procedure:  SECTION REPEAT;  Surgeon: Ina Stephen MD;  Location: [facility] LABOR DELIVERY;  Service: Obstetrics;  Laterality: Bilateral;    TONSILLECTOMY      WISDOM TOOTH EXTRACTION       No additional pertinent       Social History     Socioeconomic History    Marital status:    Tobacco Use    Smoking status: Never    Smokeless tobacco: Never   Vaping Use    Vaping status: Never Used   Substance and Sexual Activity    Alcohol use: No    Drug use: No    Sexual activity: Yes  "    Partners: Male       Family History   Problem Relation Age of Onset    Interstitial cystitis Mother        Allergies:  Penicillins    Home Medications:  Prior to Admission medications    Medication Sig Start Date End Date Taking? Authorizing Provider   naproxen (NAPROSYN) 500 MG tablet Take 1 tablet by mouth 2 (Two) Times a Day As Needed for Moderate Pain. 11/12/24   Philip Brito PA-C   ondansetron ODT (ZOFRAN-ODT) 4 MG disintegrating tablet Place 1 tablet on the tongue Every 8 (Eight) Hours As Needed for Nausea or Vomiting. 11/12/24   Philip Brito PA-C         REVIEW OF SYSTEMS       Review of Systems   Constitutional:  Negative for diaphoresis and fever.   Respiratory:  Negative for chest tightness and shortness of breath.    Cardiovascular:  Negative for chest pain.   Gastrointestinal:  Positive for abdominal pain. Negative for nausea and vomiting.   Endocrine: Negative for polyuria.   Genitourinary:  Positive for flank pain. Negative for difficulty urinating and frequency.       PHYSICAL EXAM      INITIAL VITALS:   /84 (BP Location: Left arm, Patient Position: Sitting)   Pulse 108   Temp 98.1 °F (36.7 °C) (Oral)   Resp 15   Ht 172.7 cm (68\")   Wt 90.7 kg (200 lb)   LMP 05/29/2025 (Exact Date)   SpO2 98%   BMI 30.41 kg/m²     Physical Exam  Constitutional:       Appearance: Normal appearance.   HENT:      Head: Normocephalic and atraumatic.      Mouth/Throat:      Mouth: Mucous membranes are moist.      Pharynx: Oropharynx is clear.   Cardiovascular:      Rate and Rhythm: Normal rate and regular rhythm.      Pulses: Normal pulses.   Pulmonary:      Effort: Pulmonary effort is normal.      Breath sounds: Normal breath sounds.   Abdominal:      General: Abdomen is flat. There is no distension.      Palpations: Abdomen is soft.      Tenderness: There is abdominal tenderness. There is no right CVA tenderness, left CVA tenderness or guarding.   Musculoskeletal:         General: Normal range " of motion.   Skin:     General: Skin is warm and dry.   Neurological:      General: No focal deficit present.      Mental Status: She is alert and oriented to person, place, and time.   Psychiatric:         Mood and Affect: Mood normal.         Behavior: Behavior normal.           DDX/DIAGNOSTIC RESULTS / EMERGENCY DEPARTMENT COURSE / MDM     Differential Diagnosis included but not limited: Nephrolithiasis, acute cystitis, pyelonephritis, gallbladder disease, diverticulitis    Diagnoses Considered but Do Not Suspect: Sepsis or severe infectious processes.    Decision Rules/Scores utilized: N/A     Tests considered but not ordered and why:  N/A     MIPS: N/A     Code Status Discussion:  Not Discussed    Additional Patient Education Provided: None     Medical Decision Making    Medical Decision Making  Patient presents with flank pain likely secondary to pyelonephritis with secondary non-obstructed stone. Given history, exam, and work up I have low suspicion for atypical appendicitis, genital torsion, acute cholecystitis, AAA, infected obstructed stone, or other emergent intraabdominal pathology.  Patient has noted to have infection at this time.  Patient was given a gram of ceftriaxone and started onKeflex.  Patient states mild fever prior to arrival concern concerns for severe infection.  BMP witohut evidence of LV. Pain treated in ED with Toradol.  Patient was offered transfer to facility with urology for urology evaluation.  Did educate patient on the risk of developing renal abscess, worsening infection of the kidney that could impact kidney function, and developing sepsis.  Patient with nonobstructing stone and feels is more of an secondary finding along with her pyelonephritis and infectious processes.  Did discuss the importance of antibiotic management.  Patient was treated for pyelonephritis with IV ceftriaxone and Keflex 4 times a day for 10 days.  Did stressed the importance of follow-up with urology.   Did provide literature on what the CT scan results mean for concerns of acute focal nephritis.  Based on shared decision making, strict return precautions, and close follow-up as patient has PCP appointment on Thursday, patient was discharge with outpatient follow-up with Urology, given OP referral.  Patient instructed return for any worsening flank pain, fevers, chills, concerns for worsening infection including dysuria or worsening hematuria.  Patient  will be with the patient and if anything worsens will bring patient in.    Problems Addressed:  Acute focal nephritis: complicated acute illness or injury  Right nephrolithiasis: complicated acute illness or injury    Amount and/or Complexity of Data Reviewed  External Data Reviewed: labs and notes.  Labs: ordered. Decision-making details documented in ED Course.  Radiology: ordered.    Risk  Prescription drug management.        See ED COURSE for additional MDM statements    EKG  None Performed     All EKG's are interpreted by the Emergency Department Physician who either signs or Co-signs this chart in the absence of a cardiologist.    Additional Scores                   EMERGENCY DEPARTMENT COURSE:    ED Course as of 06/16/25 1116   Sun Reji 15, 2025   2150 WBC(!): 19.27 [CR]   2150 Heart Rate(!): 140 [CR]   2215 WBC(!): 19.27 [CR]   2216 Creatinine: 0.78 [CR]      ED Course User Index  [CR] Jonny Albarado,        PROCEDURES:  None Performed   Procedures    DATA FOR LAB AND RADIOLOGY TESTS ORDERED BELOW ARE REVIEWED BY THE ED CLINICIAN:    RADIOLOGY: All x-rays, CT, MRI, and formal ultrasound images (except ED bedside ultrasound) are read by the radiologist, see reports below, unless otherwise noted in MDM or here.  Reports below are reviewed by myself.  CT Abdomen Pelvis With Contrast   Final Result   IMPRESSION:      1. Indeterminate small area of right posterior mid renal hypodensity. Differential could include focal nephritis, or prior  injury.      2. 2 mm nonobstructing right renal stone.      3. Moderate stool without bowel obstruction.      Authenticated and Electronically Signed by Jaleel Casanova MD   on 06/15/2025 10:18:31 PM          LABS: Lab orders shown below, the results are reviewed by myself, and all abnormals are listed below.  Labs Reviewed   COMPREHENSIVE METABOLIC PANEL - Abnormal; Notable for the following components:       Result Value    Glucose 109 (*)     Sodium 134 (*)     CO2 18.7 (*)     Anion Gap 15.3 (*)     All other components within normal limits    Narrative:     GFR Categories in Chronic Kidney Disease (CKD)              GFR Category          GFR (mL/min/1.73)    Interpretation  G1                    90 or greater        Normal or high (1)  G2                    60-89                Mild decrease (1)  G3a                   45-59                Mild to moderate decrease  G3b                   30-44                Moderate to severe decrease  G4                    15-29                Severe decrease  G5                    14 or less           Kidney failure    (1)In the absence of evidence of kidney disease, neither GFR category G1 or G2 fulfill the criteria for CKD.    eGFR calculation 2021 CKD-EPI creatinine equation, which does not include race as a factor   URINALYSIS W/ MICROSCOPIC IF INDICATED (NO CULTURE) - Abnormal; Notable for the following components:    Appearance, UA Turbid (*)     Ketones, UA 15 mg/dL (1+) (*)     Blood, UA Moderate (2+) (*)     Protein, UA 30 mg/dL (1+) (*)     Leuk Esterase, UA Large (3+) (*)     All other components within normal limits   CBC WITH AUTO DIFFERENTIAL - Abnormal; Notable for the following components:    WBC 19.27 (*)     Neutrophil % 76.5 (*)     Lymphocyte % 14.9 (*)     Eosinophil % 0.0 (*)     Immature Grans % 0.6 (*)     Neutrophils, Absolute 14.73 (*)     Monocytes, Absolute 1.49 (*)     Immature Grans, Absolute 0.11 (*)     All other components within normal  limits   URINALYSIS, MICROSCOPIC ONLY - Abnormal; Notable for the following components:    RBC, UA 3-5 (*)     WBC, UA Too Numerous to Count (*)     Bacteria, UA 2+ (*)     Squamous Epithelial Cells, UA 13-20 (*)     Mucus, UA Moderate/2+ (*)     All other components within normal limits   LIPASE - Normal   URINE CULTURE   RAINBOW DRAW    Narrative:     The following orders were created for panel order [facility] Draw.  Procedure                               Abnormality         Status                     ---------                               -----------         ------                     Green Top (Gel)[[id number]]                                  Final result               Lavender Top[[id number]]                                     Final result               Gold Top - SST[[id number]]                                   Final result               Light Blue Top[[id number]]                                   Final result                 Please view results for these tests on the individual orders.   CBC AND DIFFERENTIAL    Narrative:     The following orders were created for panel order CBC & Differential.  Procedure                               Abnormality         Status                     ---------                               -----------         ------                     CBC Auto Differential[[id number]]        Abnormal            Final result                 Please view results for these tests on the individual orders.   GREEN TOP   LAVENDER TOP   GOLD TOP - SST   LIGHT BLUE TOP       Vitals Reviewed:    Vitals:    06/15/25 2027 06/15/25 2057 06/15/25 2230 06/15/25 2254   BP: 106/78  124/84    BP Location:   Left arm    Patient Position:   Sitting    Pulse:  107 108    Resp:   15    Temp:    98.1 °F (36.7 °C)   TempSrc:    Oral   SpO2: 97% 97% 98%    Weight:       Height:           MEDICATIONS GIVEN TO PATIENT THIS ENCOUNTER:  Medications   ketorolac (TORADOL) injection 30 mg (30 mg Intravenous Given 6/15/25 2106)    iopamidol (ISOVUE-300) 61 % injection 100 mL (100 mL Intravenous Given 6/15/25 2131)   sodium chloride 0.9 % bolus 1,000 mL (0 mL Intravenous Stopped 6/15/25 2246)   cefTRIAXone (ROCEPHIN) 1,000 mg in sodium chloride 0.9 % 100 mL IVPB-VTB (0 mg Intravenous Stopped 6/15/25 2232)       CONSULTS:  None    CRITICAL CARE:  There was significant risk of life threatening deterioration of patient's condition requiring my direct management. Critical care time 0 minutes, excluding any documented procedures.    FINAL IMPRESSION      1. Acute focal nephritis    2. Right nephrolithiasis          DISPOSITION / PLAN     ED Disposition       ED Disposition   Discharge    Condition   Stable    Comment   --               PATIENT REFERRED TO:  Anastacia Salcido APRN  [facility]  [address] [place], [place] [id number] [phone]    In 3 days        DISCHARGE MEDICATIONS:     Medication List        START taking these medications      cephalexin 500 MG capsule  Commonly known as: KEFLEX  Take 1 capsule by mouth 4 (Four) Times a Day for 10 days.     ibuprofen 600 MG tablet  Commonly known as: ADVIL,MOTRIN  Take 1 tablet by mouth 3 (Three) Times a Day.               Where to Get Your Medications        These medications were sent to [facility] [address] [place] [address] - [address] [place] [address] - [phone]  - [phone] [address] [address]      Phone: [phone]   cephalexin 500 MG capsule  ibuprofen 600 MG tablet         Electronically signed by Jonny Albarado DO, 06/15/25, 8:08 PM EDT.    Emergency Medicine Physician  [facility]  (Please note that portions of thisnote were completed with a voice recognition program.  Efforts were made to edit the dictations but occasionally words are mis-transcribed.)       Jonny Albarado DO  06/16/25 1123

## 2025-06-16 NOTE — DISCHARGE INSTRUCTIONS
If you notice any concerning symptoms, please return to the ER immediately. These can include but are not limited to: worsening of you condition, fevers, chills, shortness of breath, vomiting, weakness of the extremities, changes in your mental status, numbness, pale extremities, or chest pain.     Take medications as prescribed, your pharmacist may have additional recommendations concerning these medications.    For pain use ibuprofen (Motrin) or acetaminophen (Tylenol), unless prescribed medications that also contain these medications.  You can take over the counter acetaminophen or ibuprofen, please follow the directions as dosages differ. Do not take ibuprofen if you have a history of peptic ulcers, kidney disease, bariatric surgery, or are currently pregnant.  Do not take Tylenol if you have a history of liver disease or alcohol use disorder.        THANK YOU!!! From [facility] Emergency Department    On behalf of the Emergency Department staff at [facility], I would like to thank you for giving us the opportunity to address your health care needs and concerns. We hope that during your visit, our service was delivered in a professional and caring manner. Please keep [facility] in mind as we walk with you down the path to your own personal wellness. Please expect follow-up phone calls concerning additional care and questions about your experience.      You have received additional information specific to your diagnosis in these discharge instructions, please read these fully.  Anytime you have been seen in the emergency department we recommend close follow up with your primary care provider or specialist, please follow these directions as indicated.

## 2025-06-17 ENCOUNTER — APPOINTMENT (OUTPATIENT)
Facility: HOSPITAL | Age: 25
End: 2025-06-17
Payer: COMMERCIAL

## 2025-06-17 ENCOUNTER — HOSPITAL ENCOUNTER (EMERGENCY)
Facility: HOSPITAL | Age: 25
Discharge: HOME | End: 2025-06-17
Attending: STUDENT IN AN ORGANIZED HEALTH CARE EDUCATION/TRAINING PROGRAM | Admitting: STUDENT IN AN ORGANIZED HEALTH CARE EDUCATION/TRAINING PROGRAM
Payer: COMMERCIAL

## 2025-06-17 VITALS
HEART RATE: 78 BPM | HEIGHT: 68 IN | OXYGEN SATURATION: 100 % | RESPIRATION RATE: 16 BRPM | BODY MASS INDEX: 30.31 KG/M2 | WEIGHT: 200 LBS | TEMPERATURE: 98.5 F | SYSTOLIC BLOOD PRESSURE: 106 MMHG | DIASTOLIC BLOOD PRESSURE: 63 MMHG

## 2025-06-17 DIAGNOSIS — N12 PYELONEPHRITIS: Primary | ICD-10-CM

## 2025-06-17 LAB
ALBUMIN SERPL-MCNC: 4.4 G/DL (ref 3.5–5.2)
ALBUMIN/GLOB SERPL: 1.5 G/DL
ALP SERPL-CCNC: 73 U/L (ref 39–117)
ALT SERPL W P-5'-P-CCNC: 17 U/L (ref 1–33)
ANION GAP SERPL CALCULATED.3IONS-SCNC: 11.4 MMOL/L (ref 5–15)
AST SERPL-CCNC: 18 U/L (ref 1–32)
B-HCG UR QL: NEGATIVE
BACTERIA UR QL AUTO: ABNORMAL /HPF
BASOPHILS # BLD AUTO: 0.04 10*3/MM3 (ref 0–0.2)
BASOPHILS NFR BLD AUTO: 0.4 % (ref 0–1.5)
BILIRUB SERPL-MCNC: 0.4 MG/DL (ref 0–1.2)
BILIRUB UR QL STRIP: NEGATIVE
BUN SERPL-MCNC: 9.9 MG/DL (ref 6–20)
BUN/CREAT SERPL: 15.2 (ref 7–25)
CALCIUM SPEC-SCNC: 9.4 MG/DL (ref 8.6–10.5)
CHLORIDE SERPL-SCNC: 108 MMOL/L (ref 98–107)
CLARITY UR: CLEAR
CO2 SERPL-SCNC: 20.6 MMOL/L (ref 22–29)
COLOR UR: YELLOW
CREAT SERPL-MCNC: 0.65 MG/DL (ref 0.57–1)
D-LACTATE SERPL-SCNC: 1.2 MMOL/L (ref 0.5–2)
DEPRECATED RDW RBC AUTO: 44.4 FL (ref 37–54)
EGFRCR SERPLBLD CKD-EPI 2021: 125.5 ML/MIN/1.73
EOSINOPHIL # BLD AUTO: 0.1 10*3/MM3 (ref 0–0.4)
EOSINOPHIL NFR BLD AUTO: 1 % (ref 0.3–6.2)
ERYTHROCYTE [DISTWIDTH] IN BLOOD BY AUTOMATED COUNT: 13.7 % (ref 12.3–15.4)
EXPIRATION DATE: NORMAL
GLOBULIN UR ELPH-MCNC: 2.9 GM/DL
GLUCOSE SERPL-MCNC: 97 MG/DL (ref 65–99)
GLUCOSE UR STRIP-MCNC: NEGATIVE MG/DL
HCT VFR BLD AUTO: 39.5 % (ref 34–46.6)
HGB BLD-MCNC: 13 G/DL (ref 12–15.9)
HGB UR QL STRIP.AUTO: ABNORMAL
HOLD SPECIMEN: NORMAL
HYALINE CASTS UR QL AUTO: ABNORMAL /LPF
IMM GRANULOCYTES # BLD AUTO: 0.05 10*3/MM3 (ref 0–0.05)
IMM GRANULOCYTES NFR BLD AUTO: 0.5 % (ref 0–0.5)
INTERNAL NEGATIVE CONTROL: NEGATIVE
INTERNAL POSITIVE CONTROL: POSITIVE
KETONES UR QL STRIP: NEGATIVE
LEUKOCYTE ESTERASE UR QL STRIP.AUTO: ABNORMAL
LIPASE SERPL-CCNC: 32 U/L (ref 13–60)
LYMPHOCYTES # BLD AUTO: 2.54 10*3/MM3 (ref 0.7–3.1)
LYMPHOCYTES NFR BLD AUTO: 25.5 % (ref 19.6–45.3)
Lab: NORMAL
MCH RBC QN AUTO: 28.5 PG (ref 26.6–33)
MCHC RBC AUTO-ENTMCNC: 32.9 G/DL (ref 31.5–35.7)
MCV RBC AUTO: 86.6 FL (ref 79–97)
MONOCYTES # BLD AUTO: 0.8 10*3/MM3 (ref 0.1–0.9)
MONOCYTES NFR BLD AUTO: 8 % (ref 5–12)
MUCOUS THREADS URNS QL MICRO: ABNORMAL /HPF
NEUTROPHILS NFR BLD AUTO: 6.44 10*3/MM3 (ref 1.7–7)
NEUTROPHILS NFR BLD AUTO: 64.6 % (ref 42.7–76)
NITRITE UR QL STRIP: NEGATIVE
PH UR STRIP.AUTO: 6 [PH] (ref 5–8)
PLATELET # BLD AUTO: 300 10*3/MM3 (ref 140–450)
PMV BLD AUTO: 10.4 FL (ref 6–12)
POTASSIUM SERPL-SCNC: 4.2 MMOL/L (ref 3.5–5.2)
PROT SERPL-MCNC: 7.3 G/DL (ref 6–8.5)
PROT UR QL STRIP: NEGATIVE
RBC # BLD AUTO: 4.56 10*6/MM3 (ref 3.77–5.28)
RBC # UR STRIP: ABNORMAL /HPF
REF LAB TEST METHOD: ABNORMAL
SODIUM SERPL-SCNC: 140 MMOL/L (ref 136–145)
SP GR UR STRIP: 1.02 (ref 1–1.03)
SQUAMOUS #/AREA URNS HPF: ABNORMAL /HPF
UROBILINOGEN UR QL STRIP: ABNORMAL
WBC # UR STRIP: ABNORMAL /HPF
WBC NRBC COR # BLD AUTO: 9.97 10*3/MM3 (ref 3.4–10.8)
WHOLE BLOOD HOLD COAG: NORMAL
WHOLE BLOOD HOLD SPECIMEN: NORMAL
YEAST URNS QL MICRO: ABNORMAL /HPF

## 2025-06-17 PROCEDURE — 96375 TX/PRO/DX INJ NEW DRUG ADDON: CPT

## 2025-06-17 PROCEDURE — 74177 CT ABD & PELVIS W/CONTRAST: CPT

## 2025-06-17 PROCEDURE — 25010000002 MORPHINE PER 10 MG: Performed by: STUDENT IN AN ORGANIZED HEALTH CARE EDUCATION/TRAINING PROGRAM

## 2025-06-17 PROCEDURE — 99285 EMERGENCY DEPT VISIT HI MDM: CPT | Performed by: STUDENT IN AN ORGANIZED HEALTH CARE EDUCATION/TRAINING PROGRAM

## 2025-06-17 PROCEDURE — 87086 URINE CULTURE/COLONY COUNT: CPT | Performed by: STUDENT IN AN ORGANIZED HEALTH CARE EDUCATION/TRAINING PROGRAM

## 2025-06-17 PROCEDURE — 83605 ASSAY OF LACTIC ACID: CPT | Performed by: STUDENT IN AN ORGANIZED HEALTH CARE EDUCATION/TRAINING PROGRAM

## 2025-06-17 PROCEDURE — 25010000002 CEFTRIAXONE PER 250 MG: Performed by: STUDENT IN AN ORGANIZED HEALTH CARE EDUCATION/TRAINING PROGRAM

## 2025-06-17 PROCEDURE — 81025 URINE PREGNANCY TEST: CPT | Performed by: STUDENT IN AN ORGANIZED HEALTH CARE EDUCATION/TRAINING PROGRAM

## 2025-06-17 PROCEDURE — 96365 THER/PROPH/DIAG IV INF INIT: CPT

## 2025-06-17 PROCEDURE — 25810000003 SODIUM CHLORIDE 0.9 % SOLUTION: Performed by: STUDENT IN AN ORGANIZED HEALTH CARE EDUCATION/TRAINING PROGRAM

## 2025-06-17 PROCEDURE — 83690 ASSAY OF LIPASE: CPT | Performed by: STUDENT IN AN ORGANIZED HEALTH CARE EDUCATION/TRAINING PROGRAM

## 2025-06-17 PROCEDURE — 25010000002 ONDANSETRON PER 1 MG: Performed by: STUDENT IN AN ORGANIZED HEALTH CARE EDUCATION/TRAINING PROGRAM

## 2025-06-17 PROCEDURE — 36415 COLL VENOUS BLD VENIPUNCTURE: CPT

## 2025-06-17 PROCEDURE — 80053 COMPREHEN METABOLIC PANEL: CPT | Performed by: STUDENT IN AN ORGANIZED HEALTH CARE EDUCATION/TRAINING PROGRAM

## 2025-06-17 PROCEDURE — 25510000001 IOPAMIDOL 61 % SOLUTION: Performed by: STUDENT IN AN ORGANIZED HEALTH CARE EDUCATION/TRAINING PROGRAM

## 2025-06-17 PROCEDURE — 85025 COMPLETE CBC W/AUTO DIFF WBC: CPT | Performed by: STUDENT IN AN ORGANIZED HEALTH CARE EDUCATION/TRAINING PROGRAM

## 2025-06-17 PROCEDURE — 81001 URINALYSIS AUTO W/SCOPE: CPT | Performed by: STUDENT IN AN ORGANIZED HEALTH CARE EDUCATION/TRAINING PROGRAM

## 2025-06-17 PROCEDURE — 87040 BLOOD CULTURE FOR BACTERIA: CPT | Performed by: STUDENT IN AN ORGANIZED HEALTH CARE EDUCATION/TRAINING PROGRAM

## 2025-06-17 RX ORDER — ONDANSETRON 2 MG/ML
4 INJECTION INTRAMUSCULAR; INTRAVENOUS ONCE
Status: COMPLETED | OUTPATIENT
Start: 2025-06-17 | End: 2025-06-17

## 2025-06-17 RX ORDER — SODIUM CHLORIDE 9 MG/ML
10 INJECTION, SOLUTION INTRAMUSCULAR; INTRAVENOUS; SUBCUTANEOUS AS NEEDED
Status: DISCONTINUED | OUTPATIENT
Start: 2025-06-17 | End: 2025-06-17 | Stop reason: HOSPADM

## 2025-06-17 RX ORDER — CEFDINIR 300 MG/1
300 CAPSULE ORAL 2 TIMES DAILY
Qty: 20 CAPSULE | Refills: 0 | Status: SHIPPED | OUTPATIENT
Start: 2025-06-17 | End: 2025-06-27

## 2025-06-17 RX ORDER — MORPHINE SULFATE 2 MG/ML
2 INJECTION, SOLUTION INTRAMUSCULAR; INTRAVENOUS ONCE
Status: COMPLETED | OUTPATIENT
Start: 2025-06-17 | End: 2025-06-17

## 2025-06-17 RX ORDER — IOPAMIDOL 612 MG/ML
100 INJECTION, SOLUTION INTRAVASCULAR
Status: COMPLETED | OUTPATIENT
Start: 2025-06-17 | End: 2025-06-17

## 2025-06-17 RX ORDER — FLUCONAZOLE 150 MG/1
150 TABLET ORAL ONCE
Status: COMPLETED | OUTPATIENT
Start: 2025-06-17 | End: 2025-06-17

## 2025-06-17 RX ORDER — ONDANSETRON 2 MG/ML
4 INJECTION INTRAMUSCULAR; INTRAVENOUS ONCE
Status: DISCONTINUED | OUTPATIENT
Start: 2025-06-17 | End: 2025-06-17

## 2025-06-17 RX ADMIN — SODIUM CHLORIDE 500 ML: 9 INJECTION, SOLUTION INTRAVENOUS at 11:51

## 2025-06-17 RX ADMIN — FLUCONAZOLE 150 MG: 150 TABLET ORAL at 10:05

## 2025-06-17 RX ADMIN — SODIUM CHLORIDE 1000 ML: 9 INJECTION, SOLUTION INTRAVENOUS at 08:30

## 2025-06-17 RX ADMIN — ONDANSETRON 4 MG: 2 INJECTION INTRAMUSCULAR; INTRAVENOUS at 08:31

## 2025-06-17 RX ADMIN — IOPAMIDOL 85 ML: 612 INJECTION, SOLUTION INTRAVENOUS at 09:49

## 2025-06-17 RX ADMIN — MORPHINE SULFATE 2 MG: 2 INJECTION, SOLUTION INTRAMUSCULAR; INTRAVENOUS at 09:10

## 2025-06-17 RX ADMIN — SODIUM CHLORIDE 2000 MG: 900 INJECTION INTRAVENOUS at 11:32

## 2025-06-17 NOTE — Clinical Note
[facility] EMERGENCY DEPARTMENT [place]  [address]  [facility] [address]  Phone: [phone]  Fax: [phone]    Terrie Menjivar was seen and treated in our emergency department on 6/17/2025.  She may return to work on 06/21/2025.  May return sooner if feeling better.       Thank you for choosing [facility].    Reji King MD

## 2025-06-17 NOTE — FSED PROVIDER NOTE
"Subjective  History of Present Illness:    25 year old female who presents with right flank pain for the last several days. She went to [facility] and was diagnosed with UTI with kidney stone. She was advised to get transferred to [facility] for urology care however opted to go home instead. She has been taking keflex compliantly. She states she continues to have persistent pain so presents here today. She had a fever of 102.8 prior to OSH ER visit. She has been using ibuprofen \"religiously\" for the last several days       Nurses Notes reviewed and agree, including vitals, allergies, social history and prior medical history.     REVIEW OF SYSTEMS: All systems reviewed and not pertinent unless noted.  Review of Systems   All other systems reviewed and are negative.      Past Medical History:   Diagnosis Date    Anxiety        Allergies:    Penicillins      Past Surgical History:   Procedure Laterality Date    ADENOIDECTOMY       SECTION N/A 2021    Procedure:  SECTION PRIMARY;  Surgeon: Lavelle Ortega III, MD;  Location:  [facility];  Service: Obstetrics/Gynecology;  Laterality: N/A;     SECTION Bilateral 2022    Procedure:  SECTION REPEAT;  Surgeon: Ina Stephen MD;  Location:  [facility];  Service: Obstetrics;  Laterality: Bilateral;    TONSILLECTOMY      WISDOM TOOTH EXTRACTION           Social History     Socioeconomic History    Marital status:    Tobacco Use    Smoking status: Never    Smokeless tobacco: Never   Vaping Use    Vaping status: Never Used   Substance and Sexual Activity    Alcohol use: No    Drug use: No    Sexual activity: Yes     Partners: Male         Family History   Problem Relation Age of Onset    Interstitial cystitis Mother        Objective  Physical Exam:  BP (!) 58/26   Pulse 82   Temp 98.3 °F (36.8 °C) (Oral)   Resp 18   Ht 172.7 cm (68\")   Wt 90.7 kg (200 lb)   LMP 2025 (Exact Date)   SpO2 100%   " BMI 30.41 kg/m²      Physical Exam  Constitutional:       Appearance: Normal appearance.   HENT:      Head: Normocephalic and atraumatic.      Nose: Nose normal.      Mouth/Throat:      Mouth: Mucous membranes are moist.   Eyes:      Extraocular Movements: Extraocular movements intact.      Conjunctiva/sclera: Conjunctivae normal.   Cardiovascular:      Rate and Rhythm: Normal rate and regular rhythm.   Pulmonary:      Effort: Pulmonary effort is normal. No respiratory distress.   Abdominal:      General: Abdomen is flat. There is no distension.      Palpations: Abdomen is soft.      Tenderness: There is no abdominal tenderness. There is no guarding or rebound.      Comments: Atraumatic    Musculoskeletal:         General: Normal range of motion.      Cervical back: Normal range of motion and neck supple.   Skin:     General: Skin is warm and dry.   Neurological:      General: No focal deficit present.      Mental Status: She is alert.      Comments: Moving all extremities spontaneously    Psychiatric:         Mood and Affect: Mood normal.         Behavior: Behavior normal.         Procedures    ED Course:         Lab Results (last 24 hours)       Procedure Component Value Units Date/Time    POC Urine Pregnancy [[id number]]  (Normal) Collected: 06/17/25 0816    Specimen: Urine Updated: 06/17/25 0817     HCG, Urine, QL Negative     Lot Number [id number]     Internal Positive Control Positive     Internal Negative Control Negative     Expiration Date 10/17/2026    CBC & Differential [[id number]]  (Normal) Collected: 06/17/25 0833    Specimen: Blood Updated: 06/17/25 0844    Narrative:      The following orders were created for panel order CBC & Differential.  Procedure                               Abnormality         Status                     ---------                               -----------         ------                     CBC Auto Differential[[id number]]        Normal              Final result                  Please view results for these tests on the individual orders.    Comprehensive Metabolic Panel [[id number]]  (Abnormal) Collected: 06/17/25 0833    Specimen: Blood Updated: 06/17/25 0901     Glucose 97 mg/dL      BUN 9.9 mg/dL      Creatinine 0.65 mg/dL      Sodium 140 mmol/L      Potassium 4.2 mmol/L      Chloride 108 mmol/L      CO2 20.6 mmol/L      Calcium 9.4 mg/dL      Total Protein 7.3 g/dL      Albumin 4.4 g/dL      ALT (SGPT) 17 U/L      AST (SGOT) 18 U/L      Alkaline Phosphatase 73 U/L      Total Bilirubin 0.4 mg/dL      Globulin 2.9 gm/dL      A/G Ratio 1.5 g/dL      BUN/Creatinine Ratio 15.2     Anion Gap 11.4 mmol/L      eGFR 125.5 mL/min/1.73     Narrative:      GFR Categories in Chronic Kidney Disease (CKD)              GFR Category          GFR (mL/min/1.73)    Interpretation  G1                    90 or greater        Normal or high (1)  G2                    60-89                Mild decrease (1)  G3a                   45-59                Mild to moderate decrease  G3b                   30-44                Moderate to severe decrease  G4                    15-29                Severe decrease  G5                    14 or less           Kidney failure    (1)In the absence of evidence of kidney disease, neither GFR category G1 or G2 fulfill the criteria for CKD.    eGFR calculation 2021 CKD-EPI creatinine equation, which does not include race as a factor    Lipase [[id number]]  (Normal) Collected: 06/17/25 0833    Specimen: Blood Updated: 06/17/25 0900     Lipase 32 U/L     Urinalysis With Microscopic If Indicated (No Culture) - Urine, Clean Catch [[id number]]  (Abnormal) Collected: 06/17/25 0833    Specimen: Urine, Clean Catch Updated: 06/17/25 0924     Color, UA Yellow     Appearance, UA Clear     pH, UA 6.0     Specific Gravity, UA 1.020     Glucose, UA Negative     Ketones, UA Negative     Bilirubin, UA Negative     Blood, UA Small (1+)     Protein, UA Negative     Leuk Esterase, UA Moderate  (2+)     Nitrite, UA Negative     Urobilinogen, UA 0.2 E.U./dL    CBC Auto Differential [[id number]]  (Normal) Collected: 06/17/25 0833    Specimen: Blood Updated: 06/17/25 0844     WBC 9.97 10*3/mm3      RBC 4.56 10*6/mm3      Hemoglobin 13.0 g/dL      Hematocrit 39.5 %      MCV 86.6 fL      MCH 28.5 pg      MCHC 32.9 g/dL      RDW 13.7 %      RDW-SD 44.4 fl      MPV 10.4 fL      Platelets 300 10*3/mm3      Neutrophil % 64.6 %      Lymphocyte % 25.5 %      Monocyte % 8.0 %      Eosinophil % 1.0 %      Basophil % 0.4 %      Immature Grans % 0.5 %      Neutrophils, Absolute 6.44 10*3/mm3      Lymphocytes, Absolute 2.54 10*3/mm3      Monocytes, Absolute 0.80 10*3/mm3      Eosinophils, Absolute 0.10 10*3/mm3      Basophils, Absolute 0.04 10*3/mm3      Immature Grans, Absolute 0.05 10*3/mm3     Urinalysis, Microscopic Only - Urine, Clean Catch [[id number]]  (Abnormal) Collected: 06/17/25 0833    Specimen: Urine, Clean Catch Updated: 06/17/25 0924     RBC, UA 3-5 /HPF      WBC, UA 21-50 /HPF      Bacteria, UA None Seen /HPF      Squamous Epithelial Cells, UA 21-30 /HPF      Yeast, UA       Small/1+ Budding Yeast w/Hyphae     /HPF     Hyaline Casts, UA 7-12 /LPF      Mucus, UA Moderate/2+ /HPF      Methodology Manual Light Microscopy    Lactic Acid, Plasma [[id number]]  (Normal) Collected: 06/17/25 0833    Specimen: Blood Updated: 06/17/25 1013     Lactate 1.2 mmol/L              CT Abdomen Pelvis With Contrast  Result Date: 6/17/2025  CT ABDOMEN PELVIS W CONTRAST Date of Exam: 6/17/2025 9:30 AM EDT Indication: recent hx of nephritis, worsening pain. Comparison: 6/15/2025 Technique: Axial CT images were obtained of the abdomen and pelvis following the uneventful intravenous administration of iodinated contrast. Reconstructed coronal and sagittal images were also obtained. Automated exposure control and iterative construction methods were used. Findings: Lower Chest: Lung bases clear Organs: Tiny right renal calculus.  Persistent focus of hypoenhancement in the mid right kidney better demonstrated on the delayed series. No walled off fluid collection. No perinephric fluid collection. No new right renal abnormality. Homogeneous enhancement of the left kidney. No hydronephrosis bilaterally. Liver, spleen, pancreas, adrenal glands, gallbladder unremarkable Gastrointestinal: No intestinal distention or evident wall thickening. Pelvis: Reproductive organs within normal limits. Small mount of free pelvic fluid, likely physiologic. Urinary bladder grossly unremarkable Peritoneum/Retroperitoneum: No ascites or pneumoperitoneum. Normal caliber aorta Bones/Soft Tissues: No suspicious bone lesion     Impression: Grossly stable appearance of the right kidney with findings compatible with focal nephritis. No evidence of nephric or perinephric abscess. Tiny right renal calculus again demonstrated Electronically Signed: Marcos Canas  6/17/2025 10:07 AM EDT  Workstation ID: [id number]    CT Abdomen Pelvis With Contrast  Result Date: 6/15/2025  FINAL REPORT TECHNIQUE: null CLINICAL HISTORY: Nephrolithiasis COMPARISON: null FINDINGS: CT abdomen and pelvis with contrast Comparison: None Findings: No consolidation or effusion. Small area of hypodensity in the right posterior mid kidney image 34. 2 mm nonobstructing right mid renal stone image 35. Gallbladder and solid organs otherwise unremarkable. No bowel obstruction, pneumoperitoneum, or pneumatosis. Mesenteric vessels patent. Moderate stool. The appendix is not definitely identified. No inflammatory changes. Uterus and ovaries normal. No ascites or hernia. The bones are intact.     Impression: IMPRESSION: 1. Indeterminate small area of right posterior mid renal hypodensity. Differential could include focal nephritis, or prior injury. 2. 2 mm nonobstructing right renal stone. 3. Moderate stool without bowel obstruction. Authenticated and Electronically Signed by Jaleel Casanova MD on 06/15/2025  10:18:31 PM         [facility]     Amount and/or Complexity of Data Reviewed  Decide to obtain previous medical records or to obtain history from someone other than the patient: yes        DDX: includes but is not limited to: infected stone, pyelonephritis, sepsis    Pertinent features from physical exam: benign.    Initial diagnostic plan: labs, ct     Results from initial plan were reviewed and interpreted by me revealing UA is dirty. Shows some yeast-patient given fluconazole. Labs otherwise nonactionable. Ct shows stable nephritis     Diagnostic information from other sources: chart review    Interventions / Re-evaluation: morphine, zofran    Medications   Sodium Chloride (PF) 0.9 % 10 mL (has no administration in time range)   cefTRIAXone (ROCEPHIN) 2,000 mg in sodium chloride 0.9 % 100 mL MBP (has no administration in time range)   sodium chloride 0.9 % bolus 1,000 mL (0 mL Intravenous Stopped 6/17/25 0912)   ondansetron (ZOFRAN) injection 4 mg (4 mg Intravenous Given 6/17/25 0831)   morphine injection 2 mg (2 mg Intravenous Given 6/17/25 0910)   fluconazole (DIFLUCAN) tablet 150 mg (150 mg Oral Given 6/17/25 1005)   iopamidol (ISOVUE-300) 61 % injection 100 mL (85 mL Intravenous Given 6/17/25 0949)       Results/clinical rationale were discussed with patient/mother    Consultations/Discussion of results with other physicians: Dr. Banks recommends we can escalate antibiotic therapy. He can see patient in clinic    Data interpreted: Nursing notes reviewed, vital signs reviewed.  Labs independently interpreted by me .  Imaging independently interpreted by me.  EKG independently interpreted by me.      Counseling: Discussed the results above with the patient regarding need for admission or discharge.  Patient understands and agrees plan of care.      -----  ED Disposition       ED Disposition   Discharge    Condition   Stable    Comment   --             Final diagnoses:   Pyelonephritis      Your Follow-Up Providers        Schedule an appointment as soon as possible for a visit  with Zuleyma Bautista MD.    Specialty: Urology  [address] [facility] [address]  [address]  [place] [address]  [phone]               [facility] EMERGENCY DEPARTMENT [place].    Specialty: Emergency Medicine  Follow up details: As needed, If symptoms worsen  [address] [place] [address] [address]  [facility] [id number] [phone]                     Contact information for after-discharge care    Follow-up information has not been specified.                    Your medication list        START taking these medications        Instructions Last Dose Given Next Dose Due   cefdinir 300 MG capsule  Commonly known as: OMNICEF      Take 1 capsule by mouth 2 (Two) Times a Day for 10 days.              CONTINUE taking these medications        Instructions Last Dose Given Next Dose Due   cephalexin 500 MG capsule  Commonly known as: KEFLEX      Take 1 capsule by mouth 4 (Four) Times a Day for 10 days.       ibuprofen 600 MG tablet  Commonly known as: ADVIL,MOTRIN      Take 1 tablet by mouth 3 (Three) Times a Day.                 Where to Get Your Medications        These medications were sent to [facility] [place]  [address], [address], [place] [address]      Hours: Monday to Friday 9 AM to 5:30 PM Phone: [phone]   cefdinir 300 MG capsule

## 2025-06-19 ENCOUNTER — LAB (OUTPATIENT)
Dept: LAB | Facility: HOSPITAL | Age: 25
End: 2025-06-19
Payer: COMMERCIAL

## 2025-06-19 ENCOUNTER — OFFICE VISIT (OUTPATIENT)
Dept: INTERNAL MEDICINE | Facility: CLINIC | Age: 25
End: 2025-06-19
Payer: COMMERCIAL

## 2025-06-19 ENCOUNTER — OFFICE VISIT (OUTPATIENT)
Age: 25
End: 2025-06-19
Payer: COMMERCIAL

## 2025-06-19 VITALS
OXYGEN SATURATION: 98 % | HEART RATE: 111 BPM | WEIGHT: 212.2 LBS | HEIGHT: 68 IN | DIASTOLIC BLOOD PRESSURE: 70 MMHG | SYSTOLIC BLOOD PRESSURE: 110 MMHG | RESPIRATION RATE: 22 BRPM | TEMPERATURE: 98.2 F | BODY MASS INDEX: 32.16 KG/M2

## 2025-06-19 DIAGNOSIS — B37.31 YEAST VAGINITIS: ICD-10-CM

## 2025-06-19 DIAGNOSIS — N30.01 ACUTE CYSTITIS WITH HEMATURIA: ICD-10-CM

## 2025-06-19 DIAGNOSIS — R53.83 FATIGUE, UNSPECIFIED TYPE: ICD-10-CM

## 2025-06-19 DIAGNOSIS — N10 ACUTE PYELONEPHRITIS: ICD-10-CM

## 2025-06-19 DIAGNOSIS — F90.0 ATTENTION DEFICIT HYPERACTIVITY DISORDER (ADHD), PREDOMINANTLY INATTENTIVE TYPE: Primary | ICD-10-CM

## 2025-06-19 DIAGNOSIS — N20.0 RIGHT RENAL STONE: ICD-10-CM

## 2025-06-19 DIAGNOSIS — N05.9 NEPHRITIS: ICD-10-CM

## 2025-06-19 DIAGNOSIS — R10.9 RIGHT FLANK PAIN: ICD-10-CM

## 2025-06-19 LAB
25(OH)D3 SERPL-MCNC: 29.5 NG/ML (ref 30–100)
BACTERIA SPEC AEROBE CULT: ABNORMAL
BACTERIA SPEC AEROBE CULT: NO GROWTH
BILIRUB BLD-MCNC: NEGATIVE MG/DL
CLARITY, POC: ABNORMAL
COLOR UR: YELLOW
DEPRECATED RDW RBC AUTO: 41.6 FL (ref 37–54)
ERYTHROCYTE [DISTWIDTH] IN BLOOD BY AUTOMATED COUNT: 13 % (ref 12.3–15.4)
EXPIRATION DATE: ABNORMAL
FERRITIN SERPL-MCNC: 85.4 NG/ML (ref 13–150)
GLUCOSE UR STRIP-MCNC: NEGATIVE MG/DL
HCT VFR BLD AUTO: 40.7 % (ref 34–46.6)
HGB BLD-MCNC: 13.2 G/DL (ref 12–15.9)
IRON 24H UR-MRATE: 51 MCG/DL (ref 37–145)
IRON SATN MFR SERPL: 11 % (ref 20–50)
KETONES UR QL: NEGATIVE
LEUKOCYTE EST, POC: ABNORMAL
Lab: ABNORMAL
MCH RBC QN AUTO: 28.6 PG (ref 26.6–33)
MCHC RBC AUTO-ENTMCNC: 32.4 G/DL (ref 31.5–35.7)
MCV RBC AUTO: 88.1 FL (ref 79–97)
NITRITE UR-MCNC: NEGATIVE MG/ML
PH UR: 6 [PH] (ref 5–8)
PLATELET # BLD AUTO: 362 10*3/MM3 (ref 140–450)
PMV BLD AUTO: 10.8 FL (ref 6–12)
PROT UR STRIP-MCNC: NEGATIVE MG/DL
RBC # BLD AUTO: 4.62 10*6/MM3 (ref 3.77–5.28)
RBC # UR STRIP: NEGATIVE /UL
SP GR UR: 1.02 (ref 1–1.03)
TIBC SERPL-MCNC: 480 MCG/DL (ref 298–536)
TRANSFERRIN SERPL-MCNC: 322 MG/DL (ref 200–360)
TSH SERPL DL<=0.05 MIU/L-ACNC: 2.08 UIU/ML (ref 0.27–4.2)
UROBILINOGEN UR QL: ABNORMAL
VIT B12 BLD-MCNC: 520 PG/ML (ref 211–946)
WBC NRBC COR # BLD AUTO: 9.85 10*3/MM3 (ref 3.4–10.8)

## 2025-06-19 PROCEDURE — 83540 ASSAY OF IRON: CPT | Performed by: STUDENT IN AN ORGANIZED HEALTH CARE EDUCATION/TRAINING PROGRAM

## 2025-06-19 PROCEDURE — 84466 ASSAY OF TRANSFERRIN: CPT | Performed by: STUDENT IN AN ORGANIZED HEALTH CARE EDUCATION/TRAINING PROGRAM

## 2025-06-19 PROCEDURE — 36415 COLL VENOUS BLD VENIPUNCTURE: CPT | Performed by: STUDENT IN AN ORGANIZED HEALTH CARE EDUCATION/TRAINING PROGRAM

## 2025-06-19 PROCEDURE — 82306 VITAMIN D 25 HYDROXY: CPT | Performed by: STUDENT IN AN ORGANIZED HEALTH CARE EDUCATION/TRAINING PROGRAM

## 2025-06-19 PROCEDURE — 82607 VITAMIN B-12: CPT | Performed by: STUDENT IN AN ORGANIZED HEALTH CARE EDUCATION/TRAINING PROGRAM

## 2025-06-19 PROCEDURE — 86038 ANTINUCLEAR ANTIBODIES: CPT | Performed by: STUDENT IN AN ORGANIZED HEALTH CARE EDUCATION/TRAINING PROGRAM

## 2025-06-19 PROCEDURE — 82728 ASSAY OF FERRITIN: CPT | Performed by: STUDENT IN AN ORGANIZED HEALTH CARE EDUCATION/TRAINING PROGRAM

## 2025-06-19 PROCEDURE — 99204 OFFICE O/P NEW MOD 45 MIN: CPT | Performed by: STUDENT IN AN ORGANIZED HEALTH CARE EDUCATION/TRAINING PROGRAM

## 2025-06-19 PROCEDURE — 99204 OFFICE O/P NEW MOD 45 MIN: CPT | Performed by: NURSE PRACTITIONER

## 2025-06-19 PROCEDURE — 81003 URINALYSIS AUTO W/O SCOPE: CPT | Performed by: NURSE PRACTITIONER

## 2025-06-19 PROCEDURE — 84443 ASSAY THYROID STIM HORMONE: CPT | Performed by: STUDENT IN AN ORGANIZED HEALTH CARE EDUCATION/TRAINING PROGRAM

## 2025-06-19 PROCEDURE — 85027 COMPLETE CBC AUTOMATED: CPT | Performed by: STUDENT IN AN ORGANIZED HEALTH CARE EDUCATION/TRAINING PROGRAM

## 2025-06-19 RX ORDER — KETOROLAC TROMETHAMINE 10 MG/1
10 TABLET, FILM COATED ORAL EVERY 6 HOURS PRN
Qty: 20 TABLET | Refills: 0 | Status: SHIPPED | OUTPATIENT
Start: 2025-06-19 | End: 2025-06-24

## 2025-06-19 RX ORDER — FLUCONAZOLE 150 MG/1
150 TABLET ORAL ONCE
Qty: 2 TABLET | Refills: 0 | Status: SHIPPED | OUTPATIENT
Start: 2025-06-19 | End: 2025-06-19

## 2025-06-19 RX ORDER — CEFUROXIME AXETIL 500 MG/1
500 TABLET ORAL 2 TIMES DAILY
Qty: 20 TABLET | Refills: 0 | Status: SHIPPED | OUTPATIENT
Start: 2025-06-19 | End: 2025-06-29

## 2025-06-19 RX ORDER — ATOMOXETINE 40 MG/1
40 CAPSULE ORAL DAILY
Qty: 90 CAPSULE | Refills: 0 | Status: SHIPPED | OUTPATIENT
Start: 2025-06-19

## 2025-06-19 NOTE — PROGRESS NOTES
"Chief Complaint:          Chief Complaint   Patient presents with    Pyelonephritis       HPI:   25 y.o. female.      Past Medical History:        Past Medical History:   Diagnosis Date    ADHD (attention deficit hyperactivity disorder)     Allergic     Anxiety     Kidney stone 6/15/25    Pyelonephritis 6/15/25         The following portions of the patient's history were reviewed and updated as appropriate: {history reviewed:::\"allergies\",\"current medications\",\"past family history\",\"past medical history\",\"past social history\",\"past surgical history\",\"problem list\"}.    Current Meds:     Current Outpatient Medications   Medication Sig Dispense Refill    atomoxetine (Strattera) 40 MG capsule Take 1 capsule by mouth Daily. 90 capsule 0    cefdinir (OMNICEF) 300 MG capsule Take 1 capsule by mouth 2 (Two) Times a Day for 10 days. 20 capsule 0    cefuroxime (CEFTIN) 500 MG tablet Take 1 tablet by mouth 2 (Two) Times a Day for 10 days. 20 tablet 0    doxycycline (VIBRAMYCIN) 100 MG capsule Take 1 capsule by mouth 2 (Two) Times a Day for 7 days. 14 capsule 0    ibuprofen (ADVIL,MOTRIN) 600 MG tablet Take 1 tablet by mouth 3 (Three) Times a Day. 15 tablet 0    ketorolac (TORADOL) 10 MG tablet Take 1 tablet by mouth Every 6 (Six) Hours As Needed for Moderate Pain for up to 5 days. 20 tablet 0    ondansetron ODT (ZOFRAN-ODT) 4 MG disintegrating tablet Place 1 tablet on the tongue Every 8 (Eight) Hours As Needed for Nausea or Vomiting. 20 tablet 0     No current facility-administered medications for this visit.        Allergies:      Allergies   Allergen Reactions    Penicillins Hives        Past Surgical History:     Past Surgical History:   Procedure Laterality Date     SECTION N/A 2021    Procedure:  SECTION PRIMARY;  Surgeon: Lavelle Ortega III, MD;  [facility];  Service: Obstetrics/Gynecology;  Laterality: N/A;     SECTION Bilateral 2022    Procedure: "  SECTION REPEAT;  Surgeon: Ina Stephen MD;  Location: [facility] LABOR DELIVERY;  Service: Obstetrics;  Laterality: Bilateral;    TONSILLECTOMY      WISDOM TOOTH EXTRACTION           Social History:     Social History     Socioeconomic History    Marital status:    Tobacco Use    Smoking status: Never    Smokeless tobacco: Never   Vaping Use    Vaping status: Never Used   Substance and Sexual Activity    Alcohol use: No    Drug use: No    Sexual activity: Yes     Partners: Male     Birth control/protection: Vasectomy       Family History:     Family History   Problem Relation Age of Onset    Interstitial cystitis Mother     Anxiety disorder Mother     Depression Mother     Anxiety disorder Maternal Grandmother     Drug abuse Maternal Grandmother     Anxiety disorder Maternal Aunt     Depression Maternal Aunt     Miscarriages / Stillbirths Maternal Aunt        Review of Systems:     Review of Systems     Physical Exam:     Physical Exam    ***    Procedure:       Assessment/Plan:   ***     Bladder & Bowel Symptom Questionnaire    How often do you usually urinate during the day ?   2 - About every 2-3 hours    2.   How many timed do you urinate at night?   0 - 0-1 time at night   3.   What is the reason that you usually urinate?   1 - Mild urge (can delay over an hour)   4.   Once you get the urge to go, how long can you     comfortably delay?   0 - More than 60 min   5.   How often do you get a sudden urge that makes you rush to the bathroom?   0 - Never   6.   How often does a sudden urge to urinate result in you leaking urine or wetting pads?   0 - Never   7.  In your opinion, how good is your bladder control?   0 - Total control   8.  Do you have accidental bowel leakage?   no   9.  Do you have difficulty fully emptying your bladder?   no   10.  Do you experience accidental leakage when performing some physical activity such as coughing, sneezing, laughing or exercise?   no   11. Have you tried  medications to help improve your symptoms?   no   12. Would you be interested in learning about a long-lasting option that may help you with your symptoms?   no                                                                             Total Score   3     0-7 (Mild) 8-16 (Moderate) 17-28 (Severe)       Patient reports that she is not currently experiencing any symptoms of urinary incontinence.      {BMI is >= 30 and <35. (Class 1 Obesity). The following options were offered after discussion;:[id number]}          Smoking Cessation Counseling:  {Smoking Cessation Status and Time:01331}  {Smoking Cessation COR:62307}    During this visit, I spent *** minutes counseling Terrie regarding tobacco cessation.        Counseling was given to {person:0042420930} for the following topics {topic:11690}. The interim medical history and current results were reviewed.  A treatment plan with follow-up was made for No primary diagnosis found.. I spent *** minutes face to face with Terrie Menjivar and *** percentage was spent in counseling.             This document has been electronically signed by Griselda Cheng-Akwa, APRN June 23, 2025 15:41 EDT

## 2025-06-19 NOTE — LETTER
June 24, 2025     Reji King MD  [address] [address]    Patient: Terrie Menjivar   YOB: 2000   Date of Visit: 6/19/2025       Dear Reji King MD,    Thank you for referring Terrie Menjivar to me for evaluation. Below is a copy of my consult note.    If you have questions, please do not hesitate to call me. I look forward to following Terrie along with you.         Sincerely,        Griselda Cheng-Akwa, APRN        CC: No Recipients    Chief Complaint  Pyelonephritis (RECURRENT UTI/FLANK PAIN/ACUTE CYSTITIS W. HEMATURIA/PID)    Subjective         History of Present Illness:  Terrie Menjivar is a 25 y.o. female with no significant medical history besides anxiety, ADHD, who presents for evaluation secondary to pyelonephritis.  Patient is accompanied by her mother very involved in her care.  Patient reports 2 ER visits this last week.     On clinic evaluation today 06/19/24 she is in no apparent discomfort and reports significant improvement in her prior symptoms.  She is taking her antibiotics religiously, Motrin/naproxen as recommended for her flank pain.  She denies any side effects from her medications.  She denies any recent fevers, denies urinary frequency, urgency, or any burning with urination.  She denies pelvic pressure or suprapubic discomfort, does not have any flank or lower back pain today.  Denies N/V/D.  Her urinalysis today still showed trace leukocyte esterase, but is negative for gross/microscopic hematuria.  PVR is 0 mL, with a bladder symptom score of only 3-MILD    Patient reports that she was initially evaluated in [facility] ED on 06/15 after presenting with a right sided flank pain, lower back pain, radiating to her groin and pelvic area concerning for kidney stone.  States her symptoms had been intermittent prior to ER visit with chills, fevers 102.8, for which she took Tylenol with improvement. She  ALSO  did also report urinary symptoms of dysuria, frequency, urgency, right greater than left flank pain, with CVA tenderness.  Urinalysis AT ED WERE consistent with infection for which she was initially started on IV Rocephin  and sent home on cephalexin 500 mg p.o. 4 times daily.  Initially, ED had discussed hospitalization and transfer to [place] for IV antibiotics which patient deferred.    Urine Culture 06/15/25 >100,000 CFU/mL Escherichia coli Abnormal         However she returned 06/17/2024 secondary to worsening flank/lower back pain and discomfort.  HER initial/repeat CT abdomen and pelvis showed Small area of hypodensity in the right posterior mid kidney concerning for focal nephritis with no evidence of nephric or perinephric abscess Also noted was TINY a 2 mm nonobstructing right mid renal stone and moderate volume stool consistent with constipation.    Objective  Vital Signs:   There were no vitals taken for this visit.      ROS:   Review of Systems   Constitutional:  Negative for activity change, chills, fatigue and fever.   HENT:  Negative for congestion.    Eyes:  Negative for blurred vision.   Gastrointestinal:  Negative for abdominal pain, nausea and vomiting.   Genitourinary:  Positive for flank pain, frequency, hematuria, pelvic pain and pelvic pressure. Negative for difficulty urinating, dyspareunia, dysuria, genital sores, urgency, urinary incontinence and vaginal discharge.   Musculoskeletal:  Positive for back pain.   Neurological:  Negative for dizziness, headache and confusion.   Psychiatric/Behavioral:  Negative for behavioral problems and decreased concentration.         Physical Exam  Constitutional:       General: She is not in acute distress.     Appearance: She is well-developed.   HENT:      Head: Normocephalic and atraumatic.   Eyes:      Pupils: Pupils are equal, round, and reactive to light.   Neck:      Thyroid: No thyromegaly.      Trachea: No tracheal deviation.   Cardiovascular:       Rate and Rhythm: Normal rate and regular rhythm.      Heart sounds: No murmur heard.  Pulmonary:      Effort: Pulmonary effort is normal. No respiratory distress.      Breath sounds: Normal breath sounds. No stridor. No wheezing.   Abdominal:      General: Bowel sounds are normal.      Palpations: Abdomen is soft.      Tenderness: There is no abdominal tenderness.   Genitourinary:     Labia:         Right: No tenderness.         Left: No tenderness.       Vagina: Normal. No vaginal discharge.   Musculoskeletal:         General: Tenderness present. No deformity. Normal range of motion.      Cervical back: Normal range of motion.   Skin:     General: Skin is warm and dry.      Coloration: Skin is not pale.      Findings: No erythema or rash.   Neurological:      Mental Status: She is alert and oriented to person, place, and time.      Cranial Nerves: No cranial nerve deficit.      Sensory: No sensory deficit.      Coordination: Coordination normal.   Psychiatric:         Behavior: Behavior normal.         Thought Content: Thought content normal.         Judgment: Judgment normal.        Result Review:     Reviewed urinalysis, pending urine culture results.  CBC, CMP, CT abdomen and pel    RADIOLOGY (CT AND/OR KUB):    CT Abdomen and Pelvis: No results found for this or any previous visit.     CT Stone Protocol: No results found for this or any previous visit.     KUB: No results found for this or any previous visit.       LABS (3 MONTHS):    Office Visit on 06/19/2025   Component Date Value Ref Range Status   • Color 06/19/2025 Yellow  Yellow, Straw, Dark Yellow, Ina Final   • Clarity, UA 06/19/2025 Slightly Cloudy (A)  Clear Final   • Specific Gravity  06/19/2025 1.020  1.005 - 1.030 Final   • pH, Urine 06/19/2025 6.0  5.0 - 8.0 Final   • Leukocytes 06/19/2025 Trace (A)  Negative Final   • Nitrite, UA 06/19/2025 Negative  Negative Final   • Protein, POC 06/19/2025 Negative  Negative mg/dL Final   • Glucose, UA  06/19/2025 Negative  Negative mg/dL Final   • Ketones, UA 06/19/2025 Negative  Negative Final   • Urobilinogen, UA 06/19/2025 0.2 E.U./dL  Normal, 0.2 E.U./dL Final   • Bilirubin 06/19/2025 Negative  Negative Final   • Blood, UA 06/19/2025 Negative  Negative Final   • Lot Number 06/19/2025 [id number]   Final   • Expiration Date 06/19/2025 06/02/2026   Final   Office Visit on 06/19/2025   Component Date Value Ref Range Status   • TSH 06/19/2025 2.080  0.270 - 4.200 uIU/mL Final   • 25 Hydroxy, Vitamin D 06/19/2025 29.5 (L)  30.0 - 100.0 ng/ml Final   • Vitamin B-12 06/19/2025 520  211 - 946 pg/mL Final   • Ferritin 06/19/2025 85.40  13.00 - 150.00 ng/mL Final   • Iron 06/19/2025 51  37 - 145 mcg/dL Final   • Iron Saturation (TSAT) 06/19/2025 11 (L)  20 - 50 % Final   • Transferrin 06/19/2025 322  200 - 360 mg/dL Final   • TIBC 06/19/2025 480  298 - 536 mcg/dL Final   • WBC 06/19/2025 9.85  3.40 - 10.80 10*3/mm3 Final   • RBC 06/19/2025 4.62  3.77 - 5.28 10*6/mm3 Final   • Hemoglobin 06/19/2025 13.2  12.0 - 15.9 g/dL Final   • Hematocrit 06/19/2025 40.7  34.0 - 46.6 % Final   • MCV 06/19/2025 88.1  79.0 - 97.0 fL Final   • MCH 06/19/2025 28.6  26.6 - 33.0 pg Final   • MCHC 06/19/2025 32.4  31.5 - 35.7 g/dL Final   • RDW 06/19/2025 13.0  12.3 - 15.4 % Final   • RDW-SD 06/19/2025 41.6  37.0 - 54.0 fl Final   • MPV 06/19/2025 10.8  6.0 - 12.0 fL Final   • Platelets 06/19/2025 362  140 - 450 10*3/mm3 Final   • KEO Direct 06/19/2025 Negative  Negative Final   Admission on 06/17/2025, Discharged on 06/17/2025   Component Date Value Ref Range Status   • Glucose 06/17/2025 97  65 - 99 mg/dL Final   • BUN 06/17/2025 9.9  6.0 - 20.0 mg/dL Final   • Creatinine 06/17/2025 0.65  0.57 - 1.00 mg/dL Final   • Sodium 06/17/2025 140  136 - 145 mmol/L Final   • Potassium 06/17/2025 4.2  3.5 - 5.2 mmol/L Final   • Chloride 06/17/2025 108 (H)  98 - 107 mmol/L Final   • CO2 06/17/2025 20.6 (L)  22.0 - 29.0 mmol/L Final   • Calcium  06/17/2025 9.4  8.6 - 10.5 mg/dL Final   • Total Protein 06/17/2025 7.3  6.0 - 8.5 g/dL Final   • Albumin 06/17/2025 4.4  3.5 - 5.2 g/dL Final   • ALT (SGPT) 06/17/2025 17  1 - 33 U/L Final   • AST (SGOT) 06/17/2025 18  1 - 32 U/L Final   • Alkaline Phosphatase 06/17/2025 73  39 - 117 U/L Final   • Total Bilirubin 06/17/2025 0.4  0.0 - 1.2 mg/dL Final   • Globulin 06/17/2025 2.9  gm/dL Final   • A/G Ratio 06/17/2025 1.5  g/dL Final   • BUN/Creatinine Ratio 06/17/2025 15.2  7.0 - 25.0 Final   • Anion Gap 06/17/2025 11.4  5.0 - 15.0 mmol/L Final   • eGFR 06/17/2025 125.5  >60.0 mL/min/1.73 Final   • Lipase 06/17/2025 32  13 - 60 U/L Final   • Color, UA 06/17/2025 Yellow  Yellow, Straw Final   • Appearance, UA 06/17/2025 Clear  Clear Final   • pH, UA 06/17/2025 6.0  5.0 - 8.0 Final   • Specific Gravity, UA 06/17/2025 1.020  1.005 - 1.030 Final   • Glucose, UA 06/17/2025 Negative  Negative Final   • Ketones, UA 06/17/2025 Negative  Negative Final   • Bilirubin, UA 06/17/2025 Negative  Negative Final   • Blood, UA 06/17/2025 Small (1+) (A)  Negative Final   • Protein, UA 06/17/2025 Negative  Negative Final   • Leuk Esterase, UA 06/17/2025 Moderate (2+) (A)  Negative Final   • Nitrite, UA 06/17/2025 Negative  Negative Final   • Urobilinogen, UA 06/17/2025 0.2 E.U./dL  0.2 - 1.0 E.U./dL Final   • Extra Tube 06/17/2025 Hold for add-ons.   Final    Auto resulted.   • Extra Tube 06/17/2025 hold for add-on   Final    Auto resulted   • Extra Tube 06/17/2025 Hold for add-ons.   Final    Auto resulted.   • Extra Tube 06/17/2025 Hold for add-ons.   Final    Auto resulted.   • Extra Tube 06/17/2025 Hold for add-ons.   Final    Auto resulted   • WBC 06/17/2025 9.97  3.40 - 10.80 10*3/mm3 Final   • RBC 06/17/2025 4.56  3.77 - 5.28 10*6/mm3 Final   • Hemoglobin 06/17/2025 13.0  12.0 - 15.9 g/dL Final   • Hematocrit 06/17/2025 39.5  34.0 - 46.6 % Final   • MCV 06/17/2025 86.6  79.0 - 97.0 fL Final   • MCH 06/17/2025 28.5  26.6 - 33.0  pg Final   • MCHC 06/17/2025 32.9  31.5 - 35.7 g/dL Final   • RDW 06/17/2025 13.7  12.3 - 15.4 % Final   • RDW-SD 06/17/2025 44.4  37.0 - 54.0 fl Final   • MPV 06/17/2025 10.4  6.0 - 12.0 fL Final   • Platelets 06/17/2025 300  140 - 450 10*3/mm3 Final   • Neutrophil % 06/17/2025 64.6  42.7 - 76.0 % Final   • Lymphocyte % 06/17/2025 25.5  19.6 - 45.3 % Final   • Monocyte % 06/17/2025 8.0  5.0 - 12.0 % Final   • Eosinophil % 06/17/2025 1.0  0.3 - 6.2 % Final   • Basophil % 06/17/2025 0.4  0.0 - 1.5 % Final   • Immature Grans % 06/17/2025 0.5  0.0 - 0.5 % Final   • Neutrophils, Absolute 06/17/2025 6.44  1.70 - 7.00 10*3/mm3 Final   • Lymphocytes, Absolute 06/17/2025 2.54  0.70 - 3.10 10*3/mm3 Final   • Monocytes, Absolute 06/17/2025 0.80  0.10 - 0.90 10*3/mm3 Final   • Eosinophils, Absolute 06/17/2025 0.10  0.00 - 0.40 10*3/mm3 Final   • Basophils, Absolute 06/17/2025 0.04  0.00 - 0.20 10*3/mm3 Final   • Immature Grans, Absolute 06/17/2025 0.05  0.00 - 0.05 10*3/mm3 Final   • HCG, Urine, QL 06/17/2025 Negative   Final   • Lot Number 06/17/2025 [id number]   Final   • Internal Positive Control 06/17/2025 Positive   Final   • Internal Negative Control 06/17/2025 Negative   Final   • Expiration Date 06/17/2025 10/17/2026   Final   • RBC, UA 06/17/2025 3-5 (A)  None Seen, 0-2 /HPF Final   • WBC, UA 06/17/2025 21-50 (A)  None Seen, 0-2 /HPF Final   • Bacteria, UA 06/17/2025 None Seen  None Seen /HPF Final   • Squamous Epithelial Cells, UA 06/17/2025 21-30 (A)  None Seen, 0-2 /HPF Final   • Yeast, UA 06/17/2025 Small/1+ Budding Yeast w/Hyphae (A)  None Seen /HPF Final   • Hyaline Casts, UA 06/17/2025 7-12  None Seen /LPF Final   • Mucus, UA 06/17/2025 Moderate/2+ (A)  None Seen, Trace /HPF Final   • Methodology 06/17/2025 Manual Light Microscopy   Final   • Lactate 06/17/2025 1.2  0.5 - 2.0 mmol/L Final   • Blood Culture 06/17/2025 No growth at 5 days   Final   • Blood Culture 06/17/2025 No growth at 5 days   Final   • Urine  Culture 06/17/2025 No growth   Final   Admission on 06/15/2025, Discharged on 06/15/2025   Component Date Value Ref Range Status   • Glucose 06/15/2025 109 (H)  65 - 99 mg/dL Final   • BUN 06/15/2025 9.0  6.0 - 20.0 mg/dL Final   • Creatinine 06/15/2025 0.78  0.57 - 1.00 mg/dL Final   • Sodium 06/15/2025 134 (L)  136 - 145 mmol/L Final   • Potassium 06/15/2025 3.6  3.5 - 5.2 mmol/L Final   • Chloride 06/15/2025 100  98 - 107 mmol/L Final   • CO2 06/15/2025 18.7 (L)  22.0 - 29.0 mmol/L Final   • Calcium 06/15/2025 9.3  8.6 - 10.5 mg/dL Final   • Total Protein 06/15/2025 7.9  6.0 - 8.5 g/dL Final   • Albumin 06/15/2025 4.6  3.5 - 5.2 g/dL Final   • ALT (SGPT) 06/15/2025 18  1 - 33 U/L Final   • AST (SGOT) 06/15/2025 17  1 - 32 U/L Final   • Alkaline Phosphatase 06/15/2025 81  39 - 117 U/L Final   • Total Bilirubin 06/15/2025 0.9  0.0 - 1.2 mg/dL Final   • Globulin 06/15/2025 3.3  gm/dL Final   • A/G Ratio 06/15/2025 1.4  g/dL Final   • BUN/Creatinine Ratio 06/15/2025 11.5  7.0 - 25.0 Final   • Anion Gap 06/15/2025 15.3 (H)  5.0 - 15.0 mmol/L Final   • eGFR 06/15/2025 108.3  >60.0 mL/min/1.73 Final   • Lipase 06/15/2025 30  13 - 60 U/L Final   • Color, UA 06/15/2025 Yellow  Yellow, Straw Final   • Appearance, UA 06/15/2025 Turbid (A)  Clear Final   • pH, UA 06/15/2025 6.0  5.0 - 8.0 Final   • Specific Gravity, UA 06/15/2025 1.026  1.005 - 1.030 Final   • Glucose, UA 06/15/2025 Negative  Negative Final   • Ketones, UA 06/15/2025 15 mg/dL (1+) (A)  Negative Final   • Bilirubin, UA 06/15/2025 Negative  Negative Final   • Blood, UA 06/15/2025 Moderate (2+) (A)  Negative Final   • Protein, UA 06/15/2025 30 mg/dL (1+) (A)  Negative Final   • Leuk Esterase, UA 06/15/2025 Large (3+) (A)  Negative Final   • Nitrite, UA 06/15/2025 Negative  Negative Final   • Urobilinogen, UA 06/15/2025 1.0 E.U./dL  0.2 - 1.0 E.U./dL Final   • Extra Tube 06/15/2025 Hold for add-ons.   Final    Auto resulted.   • Extra Tube 06/15/2025 hold for  add-on   Final    Auto resulted   • Extra Tube 06/15/2025 Hold for add-ons.   Final    Auto resulted.   • Extra Tube 06/15/2025 Hold for add-ons.   Final    Auto resulted   • WBC 06/15/2025 19.27 (H)  3.40 - 10.80 10*3/mm3 Final   • RBC 06/15/2025 4.70  3.77 - 5.28 10*6/mm3 Final   • Hemoglobin 06/15/2025 13.5  12.0 - 15.9 g/dL Final   • Hematocrit 06/15/2025 39.5  34.0 - 46.6 % Final   • MCV 06/15/2025 84.0  79.0 - 97.0 fL Final   • MCH 06/15/2025 28.7  26.6 - 33.0 pg Final   • MCHC 06/15/2025 34.2  31.5 - 35.7 g/dL Final   • RDW 06/15/2025 13.4  12.3 - 15.4 % Final   • RDW-SD 06/15/2025 41.7  37.0 - 54.0 fl Final   • MPV 06/15/2025 10.4  6.0 - 12.0 fL Final   • Platelets 06/15/2025 338  140 - 450 10*3/mm3 Final   • Neutrophil % 06/15/2025 76.5 (H)  42.7 - 76.0 % Final   • Lymphocyte % 06/15/2025 14.9 (L)  19.6 - 45.3 % Final   • Monocyte % 06/15/2025 7.7  5.0 - 12.0 % Final   • Eosinophil % 06/15/2025 0.0 (L)  0.3 - 6.2 % Final   • Basophil % 06/15/2025 0.3  0.0 - 1.5 % Final   • Immature Grans % 06/15/2025 0.6 (H)  0.0 - 0.5 % Final   • Neutrophils, Absolute 06/15/2025 14.73 (H)  1.70 - 7.00 10*3/mm3 Final   • Lymphocytes, Absolute 06/15/2025 2.88  0.70 - 3.10 10*3/mm3 Final   • Monocytes, Absolute 06/15/2025 1.49 (H)  0.10 - 0.90 10*3/mm3 Final   • Eosinophils, Absolute 06/15/2025 0.00  0.00 - 0.40 10*3/mm3 Final   • Basophils, Absolute 06/15/2025 0.06  0.00 - 0.20 10*3/mm3 Final   • Immature Grans, Absolute 06/15/2025 0.11 (H)  0.00 - 0.05 10*3/mm3 Final   • nRBC 06/15/2025 0.0  0.0 - 0.2 /100 WBC Final   • RBC, UA 06/15/2025 3-5 (A)  None Seen, 0-2 /HPF Final   • WBC, UA 06/15/2025 Too Numerous to Count (A)  None Seen, 0-2 /HPF Final   • Bacteria, UA 06/15/2025 2+ (A)  None Seen /HPF Final   • Squamous Epithelial Cells, UA 06/15/2025 13-20 (A)  None Seen, 0-2 /HPF Final   • Hyaline Casts, UA 06/15/2025 None Seen  None Seen /LPF Final   • Mucus, UA 06/15/2025 Moderate/2+ (A)  None Seen, Trace /HPF Final   •  Methodology 06/15/2025 Manual Light Microscopy   Final   • Urine Culture 06/15/2025 >100,000 CFU/mL Escherichia coli (A)   Final      Bladder & Bowel Symptom Questionnaire    How often do you usually urinate during the day ?   2 - About every 2-3 hours    2.   How many timed do you urinate at night?   0 - 0-1 time at night   3.   What is the reason that you usually urinate?   1 - Mild urge (can delay over an hour)   4.   Once you get the urge to go, how long can you     comfortably delay?   0 - More than 60 min   5.   How often do you get a sudden urge that makes you rush to the bathroom?   0 - Never   6.   How often does a sudden urge to urinate result in you leaking urine or wetting pads?   0 - Never   7.  In your opinion, how good is your bladder control?   0 - Total control   8.  Do you have accidental bowel leakage?   no   9.  Do you have difficulty fully emptying your bladder?   no   10.  Do you experience accidental leakage when performing some physical activity such as coughing, sneezing, laughing or exercise?   no   11. Have you tried medications to help improve your symptoms?   no   12. Would you be interested in learning about a long-lasting option that may help you with your symptoms?   no                                                                             Total Score   3     0-7 (Mild) 8-16 (Moderate) 17-28 (Severe)         Assessment and Plan    Assessment & Plan  Acute pyelonephritis  Terrie Menjivar is a pleasant 25 y.o. female who presents for evaluation secondary to pyelonephritis noted on 2 consecutive CT scan 6/15/2025 post ER visit.  Presented to ED secondary to flank pain, pelvic pressure suprapubic discomfort, fevers, chills, urinary symptoms.  She is currently on antibiotic therapy and denies any side effects from her medications.    On clinic evaluation today 06/19/24 she is in no apparent discomfort and reports significant improvement in her prior symptoms. She denies any  recent fevers, denies urinary frequency, urgency, or any burning with urination.  She denies pelvic pressure or suprapubic discomfort, does not have any flank or lower back pain today.  Denies N/V/D.  Her urinalysis today still showed trace leukocyte esterase, but is negative for gross/microscopic hematuria.  PVR is 0 mL, with a bladder symptom score of only 3-MILD    EDUCATION:We discussed Acute pyelonephritis AS a sudden and severe kidney infection. This causes the kidneys to swell and may permanently damage them if untreated.  Pyelonephritis can be life-threatening. We discussed the facts that, The infection usually starts in the lower urinary tract as a urinary tract infection (UTI)( patient reports numerous UTIs during pregnancies but not recently). We discussed Bacteria enter the body through the urethra and begin to multiply and spread up to the bladder. From there, the bacteria travel through the ureters to the kidneys. Bacteria such as E. coli which she has often cause the infection. However, any serious infection in the bloodstream can also spread to the kidneys and cause acute pyelonephritis.     Recurrent urinary tract infections: We discussed the types of organisms that are found in the urinary tract indicating that the vast majority are results of the patient's own gastrointestinal santa.  We discussed how many of the antibiotics that are utilized can actually exacerbate these infections by creating resistant organisms and there is only a very few antibiotics that are concentrated in the urine and do not affect the rectal reservoir nor cause recurrent yeast vaginitis. We discussed the risk factors for recurrent infections being intercourse in younger patients and atrophic changes in older patients.  We discussed the symptoms that are found including pain, pressure, burning, frequency, urgency suprapubic pain and painful intercourse.  I discussed upper tract symptoms including fevers, chills, and  indicated the workup would be much more aggressive if the patient were to present with recurrent infections in the face of upper tract symptomatology such as fever. I discussed the history of vesicoureteral reflux in young patients and finally chronic renal scarring as a result of such.         PLAN  We resent her urine for Guidance, culture. I will call her with results if any bacteria growth resistant to her current therapy with cephalexin.    Will Continue antibiotics as prescribed post ED visit.      She has been encouraged to increase her p.o. fluid intake to at least 1 to 2 L daily and avoid bladder irritants such as caffeine products, spicy foods, and citrusy foods.     I recommend concomitant probiotics with treatment with antibiotics to protect the rectal reservoir including over-the-counter yogurt preparations to mikie oral pills containing the appropriate probiotics. Patient reports the diligent use of Probiotics.    If recurrent UTIs discussed lower tract investigation via cystoscopy-Deferred at this time,  will treat infections first.     We discussed  OTHER Treatment options for Her flank/lower pain with patient encouraged to continue conservative therapy alternating NSAID/Tylenol as tolerated, Also including hot baths, showers, warm compresses to lower back as tolerated. Also encouraged walking, physical therapy, light exercises as tolerated    Rest is the most important treatment in the case of flank pain. Rest and physical therapy are enough to improve minor pain. Discussed to monitor HIS daily routine with prevention of flank pain by: At least Drinking 8 glasses of water per day, Limiting your alcohol consumption.  Having a healthy diet containing fruits, vegetables, and a lot of fluids, Practicing safe sex.  Also maintaining proper hygiene of your body as well as the environment.    Discussed a kidney stone prevention diet to include increasing p.o. fluid intake, to at least 1 to 2 L of water  daily.  SHE is to avoid caffeine products such as cola, coffee, and to avoid soft or soda drinks.  HE is to decrease her sodium consumption as in  Fast foods, inman, salted nuts, canned foods, and smoked/cured foods. SHE  is also to decrease her oxalate consumption, as in spinach, [place] greens, and Rhubarb.  Also important is to decrease protein intake, as in red meats, peanut butter, and also avoid nuts.    Strict bowel regiment due to her issues with severe constipation    Follow-up in clinic as discussed, with renal ultrasound prior    May return sooner if need be    Will go to ER if worsening chills/fevers/symptoms consistent with sepsis    Patient/mom Agreeable to plan of care     Orders:  •  POC Urinalysis Dipstick, Automated  •  ketorolac (TORADOL) 10 MG tablet; Take 1 tablet by mouth Every 6 (Six) Hours As Needed for Moderate Pain for up to 5 days.  •  US Abdomen Complete; Future    Acute cystitis with hematuria    Orders:  •  ketorolac (TORADOL) 10 MG tablet; Take 1 tablet by mouth Every 6 (Six) Hours As Needed for Moderate Pain for up to 5 days.  •  US Abdomen Complete; Future    Yeast vaginitis    Orders:  •  fluconazole (DIFLUCAN) 150 MG tablet; Take 1 tablet by mouth 1 (One) Time for 1 dose. May repeat in 3 days if symptomatic    Right flank pain    Orders:  •  US Abdomen Complete; Future    Right renal stone    Orders:  •  US Abdomen Complete; Future      Patient reports that she is not currently experiencing any symptoms of urinary incontinence.    BMI is >= 30 and <35. (Class 1 Obesity). The following options were offered after discussion;: weight loss educational material (shared in after visit summary), exercise counseling/recommendations, and nutrition counseling/recommendations    Smoking Cessation Counseling:  Never a smoker.  Patient does not currently use any tobacco products.       Follow Up   Return in about 7 weeks (around 8/6/2025) for Next scheduled follow up, MICRO HEMATURIA, RECURRENT  UTI/DYSURIA, Cystoscopy, DR RODRÍGUEZ.    Patient was given instructions and counseling regarding her condition or for health maintenance advice. Please see specific information pulled into the AVS if appropriate.          This document has been electronically signed by Griselda Cheng-Akwa, APRN   June 23, 2025 17:49 EDT

## 2025-06-19 NOTE — PROGRESS NOTES
Subjective   Terrie Menjivar is a 25 y.o. female.     History of Present Illness  Presents to [facility]   Had 2 er visits this week for nephritis. Stone in right kidney. Small and not obstructive. Is feeling better on omnicef  Adhd - diagnosed years ago. Was on wellbutrin but did not help much. Seeing therapist weekly was not possible with her schedule so she stopped going and is trying to find new provider. Has trouble focusing at work, executive dysfunction, procrastination.   Fatigue ongoing for 2 months. So bad she naps at lunch daily at work.       The following portions of the patient's history were reviewed and updated as appropriate: allergies, current medications, past family history, past medical history, past social history, past surgical history, and problem list.    Review of Systems   All other systems reviewed and are negative.      Objective   Physical Exam  Vitals and nursing note reviewed.   Constitutional:       Appearance: Normal appearance.   HENT:      Head: Normocephalic and atraumatic.      Right Ear: External ear normal.      Left Ear: External ear normal.      Nose: Nose normal.      Mouth/Throat:      Mouth: Mucous membranes are moist.      Pharynx: Oropharynx is clear. No oropharyngeal exudate or posterior oropharyngeal erythema.   Eyes:      Extraocular Movements: Extraocular movements intact.      Conjunctiva/sclera: Conjunctivae normal.      Pupils: Pupils are equal, round, and reactive to light.   Cardiovascular:      Rate and Rhythm: Regular rhythm.      Pulses: Normal pulses.      Heart sounds: Normal heart sounds.   Pulmonary:      Effort: Pulmonary effort is normal.   Abdominal:      General: Abdomen is flat. Bowel sounds are normal.      Palpations: Abdomen is soft.   Musculoskeletal:         General: Normal range of motion.      Cervical back: Normal range of motion.   Skin:     General: Skin is warm.      Capillary Refill: Capillary refill takes less than 2 seconds.    Neurological:      General: No focal deficit present.      Mental Status: She is alert and oriented to person, place, and time. Mental status is at baseline.   Psychiatric:         Mood and Affect: Mood normal.         Behavior: Behavior normal.         Thought Content: Thought content normal.         Judgment: Judgment normal.         Assessment & Plan   Diagnoses and all orders for this visit:    1. Attention deficit hyperactivity disorder (ADHD), predominantly inattentive type (Primary)  -     atomoxetine (Strattera) 40 MG capsule; Take 1 capsule by mouth Daily.  Dispense: 90 capsule; Refill: 0  -     TSH  -     Vitamin D,25-Hydroxy  -     Vitamin B12  -     Ferritin  -     Iron Profile w/o Ferritin  -     CBC (No Diff)  -     KEO With / DsDNA, RNP, Sjogrens A / B, Smith    2. Fatigue, unspecified type  -     TSH  -     Vitamin D,25-Hydroxy  -     Vitamin B12  -     Ferritin  -     Iron Profile w/o Ferritin  -     CBC (No Diff)  -     KEO With / DsDNA, RNP, Sjogrens A / B, Phillip    3. Nephritis     Start strattera. Follow up 6 weeks. If not beneficial and needs stimulants will refer to behavioral health in future if needed  Checking labs  Fmla filled out

## 2025-06-20 ENCOUNTER — TELEPHONE (OUTPATIENT)
Dept: INTERNAL MEDICINE | Facility: CLINIC | Age: 25
End: 2025-06-20
Payer: COMMERCIAL

## 2025-06-20 LAB — ANA SER QL: NEGATIVE

## 2025-06-20 RX ORDER — ONDANSETRON 4 MG/1
4 TABLET, ORALLY DISINTEGRATING ORAL EVERY 8 HOURS PRN
Qty: 20 TABLET | Refills: 0 | Status: SHIPPED | OUTPATIENT
Start: 2025-06-20

## 2025-06-20 NOTE — TELEPHONE ENCOUNTER
Patient called today. She just started Strattera and the hospital gave her Ceftin. She is having a lot of nausea. Would like to see if she can have some Zofran sent to  clinic pharmacy

## 2025-06-22 LAB
BACTERIA SPEC AEROBE CULT: NORMAL
BACTERIA SPEC AEROBE CULT: NORMAL

## 2025-06-23 PROBLEM — N10 ACUTE PYELONEPHRITIS: Status: ACTIVE | Noted: 2025-06-23

## 2025-06-23 PROBLEM — R10.9 RIGHT FLANK PAIN: Status: ACTIVE | Noted: 2025-06-23

## 2025-06-23 PROBLEM — N20.0 RIGHT RENAL STONE: Status: ACTIVE | Noted: 2025-06-23

## 2025-06-23 PROBLEM — N30.01 ACUTE CYSTITIS WITH HEMATURIA: Status: ACTIVE | Noted: 2025-06-23

## 2025-06-23 PROBLEM — B37.31 YEAST VAGINITIS: Status: ACTIVE | Noted: 2025-06-23

## 2025-06-23 NOTE — ASSESSMENT & PLAN NOTE
Terrie Menjivar is a pleasant 25 y.o. female who presents for evaluation secondary to pyelonephritis noted on 2 consecutive CT scan 6/15/2025 post ER visit.  Presented to ED secondary to flank pain, pelvic pressure suprapubic discomfort, fevers, chills, urinary symptoms.  She is currently on antibiotic therapy and denies any side effects from her medications.    On clinic evaluation today 06/19/24 she is in no apparent discomfort and reports significant improvement in her prior symptoms. She denies any recent fevers, denies urinary frequency, urgency, or any burning with urination.  She denies pelvic pressure or suprapubic discomfort, does not have any flank or lower back pain today.  Denies N/V/D.  Her urinalysis today still showed trace leukocyte esterase, but is negative for gross/microscopic hematuria.  PVR is 0 mL, with a bladder symptom score of only 3-MILD    EDUCATION:We discussed Acute pyelonephritis AS a sudden and severe kidney infection. This causes the kidneys to swell and may permanently damage them if untreated.  Pyelonephritis can be life-threatening. We discussed the facts that, The infection usually starts in the lower urinary tract as a urinary tract infection (UTI)( patient reports numerous UTIs during pregnancies but not recently). We discussed Bacteria enter the body through the urethra and begin to multiply and spread up to the bladder. From there, the bacteria travel through the ureters to the kidneys. Bacteria such as E. coli which she has often cause the infection. However, any serious infection in the bloodstream can also spread to the kidneys and cause acute pyelonephritis.     Recurrent urinary tract infections: We discussed the types of organisms that are found in the urinary tract indicating that the vast majority are results of the patient's own gastrointestinal santa.  We discussed how many of the antibiotics that are utilized can actually exacerbate these infections by  creating resistant organisms and there is only a very few antibiotics that are concentrated in the urine and do not affect the rectal reservoir nor cause recurrent yeast vaginitis. We discussed the risk factors for recurrent infections being intercourse in younger patients and atrophic changes in older patients.  We discussed the symptoms that are found including pain, pressure, burning, frequency, urgency suprapubic pain and painful intercourse.  I discussed upper tract symptoms including fevers, chills, and indicated the workup would be much more aggressive if the patient were to present with recurrent infections in the face of upper tract symptomatology such as fever. I discussed the history of vesicoureteral reflux in young patients and finally chronic renal scarring as a result of such.         PLAN  We resent her urine for Guidance, culture. I will call her with results if any bacteria growth resistant to her current therapy with cephalexin.    Will Continue antibiotics as prescribed post ED visit.      She has been encouraged to increase her p.o. fluid intake to at least 1 to 2 L daily and avoid bladder irritants such as caffeine products, spicy foods, and citrusy foods.     I recommend concomitant probiotics with treatment with antibiotics to protect the rectal reservoir including over-the-counter yogurt preparations to mikie oral pills containing the appropriate probiotics. Patient reports the diligent use of Probiotics.    If recurrent UTIs discussed lower tract investigation via cystoscopy-Deferred at this time,  will treat infections first.     We discussed  OTHER Treatment options for Her flank/lower pain with patient encouraged to continue conservative therapy alternating NSAID/Tylenol as tolerated, Also including hot baths, showers, warm compresses to lower back as tolerated. Also encouraged walking, physical therapy, light exercises as tolerated    Rest is the most important treatment in the case of  flank pain. Rest and physical therapy are enough to improve minor pain. Discussed to monitor HIS daily routine with prevention of flank pain by: At least Drinking 8 glasses of water per day, Limiting your alcohol consumption.  Having a healthy diet containing fruits, vegetables, and a lot of fluids, Practicing safe sex.  Also maintaining proper hygiene of your body as well as the environment.    Discussed a kidney stone prevention diet to include increasing p.o. fluid intake, to at least 1 to 2 L of water daily.  SHE is to avoid caffeine products such as cola, coffee, and to avoid soft or soda drinks.  HE is to decrease her sodium consumption as in  Fast foods, inman, salted nuts, canned foods, and smoked/cured foods. SHE  is also to decrease her oxalate consumption, as in spinach, [place] greens, and Rhubarb.  Also important is to decrease protein intake, as in red meats, peanut butter, and also avoid nuts.    Strict bowel regiment due to her issues with severe constipation    Follow-up in clinic as discussed, with renal ultrasound prior    May return sooner if need be    Will go to ER if worsening chills/fevers/symptoms consistent with sepsis    Patient/mom Agreeable to plan of care     Orders:    POC Urinalysis Dipstick, Automated    ketorolac (TORADOL) 10 MG tablet; Take 1 tablet by mouth Every 6 (Six) Hours As Needed for Moderate Pain for up to 5 days.    US Abdomen Complete; Future

## 2025-06-23 NOTE — PROGRESS NOTES
Chief Complaint  Pyelonephritis (RECURRENT UTI/FLANK PAIN/ACUTE CYSTITIS W. HEMATURIA/PID)    Subjective          History of Present Illness:  Terrie Menjivar is a 25 y.o. female with no significant medical history besides anxiety, ADHD, who presents for evaluation secondary to pyelonephritis.  Patient is accompanied by her mother very involved in her care.  Patient reports 2 ER visits this last week.     On clinic evaluation today 06/19/24 she is in no apparent discomfort and reports significant improvement in her prior symptoms.  She is taking her antibiotics religiously, Motrin/naproxen as recommended for her flank pain.  She denies any side effects from her medications.  She denies any recent fevers, denies urinary frequency, urgency, or any burning with urination.  She denies pelvic pressure or suprapubic discomfort, does not have any flank or lower back pain today.  Denies N/V/D.  Her urinalysis today still showed trace leukocyte esterase, but is negative for gross/microscopic hematuria.  PVR is 0 mL, with a bladder symptom score of only 3-MILD    Patient reports that she was initially evaluated in [facility] ED on 06/15 after presenting with a right sided flank pain, lower back pain, radiating to her groin and pelvic area concerning for kidney stone.  States her symptoms had been intermittent prior to ER visit with chills, fevers 102.8, for which she took Tylenol with improvement. She  ALSO did also report urinary symptoms of dysuria, frequency, urgency, right greater than left flank pain, with CVA tenderness.  Urinalysis AT ED WERE consistent with infection for which she was initially started on IV Rocephin  and sent home on cephalexin 500 mg p.o. 4 times daily.  Initially, ED had discussed hospitalization and transfer to [place] for IV antibiotics which patient deferred.    Urine Culture 06/15/25 >100,000 CFU/mL Escherichia coli Abnormal         However she returned 06/17/2024 secondary to  worsening flank/lower back pain and discomfort.  HER initial/repeat CT abdomen and pelvis showed Small area of hypodensity in the right posterior mid kidney concerning for focal nephritis with no evidence of nephric or perinephric abscess Also noted was TINY a 2 mm nonobstructing right mid renal stone and moderate volume stool consistent with constipation.    Objective   Vital Signs:   There were no vitals taken for this visit.      ROS:   Review of Systems   Constitutional:  Negative for activity change, chills, fatigue and fever.   HENT:  Negative for congestion.    Eyes:  Negative for blurred vision.   Gastrointestinal:  Negative for abdominal pain, nausea and vomiting.   Genitourinary:  Positive for flank pain, frequency, hematuria, pelvic pain and pelvic pressure. Negative for difficulty urinating, dyspareunia, dysuria, genital sores, urgency, urinary incontinence and vaginal discharge.   Musculoskeletal:  Positive for back pain.   Neurological:  Negative for dizziness, headache and confusion.   Psychiatric/Behavioral:  Negative for behavioral problems and decreased concentration.         Physical Exam  Constitutional:       General: She is not in acute distress.     Appearance: She is well-developed.   HENT:      Head: Normocephalic and atraumatic.   Eyes:      Pupils: Pupils are equal, round, and reactive to light.   Neck:      Thyroid: No thyromegaly.      Trachea: No tracheal deviation.   Cardiovascular:      Rate and Rhythm: Normal rate and regular rhythm.      Heart sounds: No murmur heard.  Pulmonary:      Effort: Pulmonary effort is normal. No respiratory distress.      Breath sounds: Normal breath sounds. No stridor. No wheezing.   Abdominal:      General: Bowel sounds are normal.      Palpations: Abdomen is soft.      Tenderness: There is no abdominal tenderness.   Genitourinary:     Labia:         Right: No tenderness.         Left: No tenderness.       Vagina: Normal. No vaginal discharge.    Musculoskeletal:         General: Tenderness present. No deformity. Normal range of motion.      Cervical back: Normal range of motion.   Skin:     General: Skin is warm and dry.      Coloration: Skin is not pale.      Findings: No erythema or rash.   Neurological:      Mental Status: She is alert and oriented to person, place, and time.      Cranial Nerves: No cranial nerve deficit.      Sensory: No sensory deficit.      Coordination: Coordination normal.   Psychiatric:         Behavior: Behavior normal.         Thought Content: Thought content normal.         Judgment: Judgment normal.        Result Review :     Reviewed urinalysis, pending urine culture results.  CBC, CMP, CT abdomen and pel    RADIOLOGY (CT AND/OR KUB):    CT Abdomen and Pelvis: No results found for this or any previous visit.     CT Stone Protocol: No results found for this or any previous visit.     KUB: No results found for this or any previous visit.       LABS (3 MONTHS):    Office Visit on 06/19/2025   Component Date Value Ref Range Status    Color 06/19/2025 Yellow  Yellow, Straw, Dark Yellow, Ina Final    Clarity, UA 06/19/2025 Slightly Cloudy (A)  Clear Final    Specific Gravity  06/19/2025 1.020  1.005 - 1.030 Final    pH, Urine 06/19/2025 6.0  5.0 - 8.0 Final    Leukocytes 06/19/2025 Trace (A)  Negative Final    Nitrite, UA 06/19/2025 Negative  Negative Final    Protein, POC 06/19/2025 Negative  Negative mg/dL Final    Glucose, UA 06/19/2025 Negative  Negative mg/dL Final    Ketones, UA 06/19/2025 Negative  Negative Final    Urobilinogen, UA 06/19/2025 0.2 E.U./dL  Normal, 0.2 E.U./dL Final    Bilirubin 06/19/2025 Negative  Negative Final    Blood, UA 06/19/2025 Negative  Negative Final    Lot Number 06/19/2025 [id number]   Final    Expiration Date 06/19/2025 06/02/2026   Final   Office Visit on 06/19/2025   Component Date Value Ref Range Status    TSH 06/19/2025 2.080  0.270 - 4.200 uIU/mL Final    25 Hydroxy, Vitamin D  06/19/2025 29.5 (L)  30.0 - 100.0 ng/ml Final    Vitamin B-12 06/19/2025 520  211 - 946 pg/mL Final    Ferritin 06/19/2025 85.40  13.00 - 150.00 ng/mL Final    Iron 06/19/2025 51  37 - 145 mcg/dL Final    Iron Saturation (TSAT) 06/19/2025 11 (L)  20 - 50 % Final    Transferrin 06/19/2025 322  200 - 360 mg/dL Final    TIBC 06/19/2025 480  298 - 536 mcg/dL Final    WBC 06/19/2025 9.85  3.40 - 10.80 10*3/mm3 Final    RBC 06/19/2025 4.62  3.77 - 5.28 10*6/mm3 Final    Hemoglobin 06/19/2025 13.2  12.0 - 15.9 g/dL Final    Hematocrit 06/19/2025 40.7  34.0 - 46.6 % Final    MCV 06/19/2025 88.1  79.0 - 97.0 fL Final    MCH 06/19/2025 28.6  26.6 - 33.0 pg Final    MCHC 06/19/2025 32.4  31.5 - 35.7 g/dL Final    RDW 06/19/2025 13.0  12.3 - 15.4 % Final    RDW-SD 06/19/2025 41.6  37.0 - 54.0 fl Final    MPV 06/19/2025 10.8  6.0 - 12.0 fL Final    Platelets 06/19/2025 362  140 - 450 10*3/mm3 Final    KEO Direct 06/19/2025 Negative  Negative Final   Admission on 06/17/2025, Discharged on 06/17/2025   Component Date Value Ref Range Status    Glucose 06/17/2025 97  65 - 99 mg/dL Final    BUN 06/17/2025 9.9  6.0 - 20.0 mg/dL Final    Creatinine 06/17/2025 0.65  0.57 - 1.00 mg/dL Final    Sodium 06/17/2025 140  136 - 145 mmol/L Final    Potassium 06/17/2025 4.2  3.5 - 5.2 mmol/L Final    Chloride 06/17/2025 108 (H)  98 - 107 mmol/L Final    CO2 06/17/2025 20.6 (L)  22.0 - 29.0 mmol/L Final    Calcium 06/17/2025 9.4  8.6 - 10.5 mg/dL Final    Total Protein 06/17/2025 7.3  6.0 - 8.5 g/dL Final    Albumin 06/17/2025 4.4  3.5 - 5.2 g/dL Final    ALT (SGPT) 06/17/2025 17  1 - 33 U/L Final    AST (SGOT) 06/17/2025 18  1 - 32 U/L Final    Alkaline Phosphatase 06/17/2025 73  39 - 117 U/L Final    Total Bilirubin 06/17/2025 0.4  0.0 - 1.2 mg/dL Final    Globulin 06/17/2025 2.9  gm/dL Final    A/G Ratio 06/17/2025 1.5  g/dL Final    BUN/Creatinine Ratio 06/17/2025 15.2  7.0 - 25.0 Final    Anion Gap 06/17/2025 11.4  5.0 - 15.0 mmol/L Final     eGFR 06/17/2025 125.5  >60.0 mL/min/1.73 Final    Lipase 06/17/2025 32  13 - 60 U/L Final    Color, UA 06/17/2025 Yellow  Yellow, Straw Final    Appearance, UA 06/17/2025 Clear  Clear Final    pH, UA 06/17/2025 6.0  5.0 - 8.0 Final    Specific Gravity, UA 06/17/2025 1.020  1.005 - 1.030 Final    Glucose, UA 06/17/2025 Negative  Negative Final    Ketones, UA 06/17/2025 Negative  Negative Final    Bilirubin, UA 06/17/2025 Negative  Negative Final    Blood, UA 06/17/2025 Small (1+) (A)  Negative Final    Protein, UA 06/17/2025 Negative  Negative Final    Leuk Esterase, UA 06/17/2025 Moderate (2+) (A)  Negative Final    Nitrite, UA 06/17/2025 Negative  Negative Final    Urobilinogen, UA 06/17/2025 0.2 E.U./dL  0.2 - 1.0 E.U./dL Final    Extra Tube 06/17/2025 Hold for add-ons.   Final    Auto resulted.    Extra Tube 06/17/2025 hold for add-on   Final    Auto resulted    Extra Tube 06/17/2025 Hold for add-ons.   Final    Auto resulted.    Extra Tube 06/17/2025 Hold for add-ons.   Final    Auto resulted.    Extra Tube 06/17/2025 Hold for add-ons.   Final    Auto resulted    WBC 06/17/2025 9.97  3.40 - 10.80 10*3/mm3 Final    RBC 06/17/2025 4.56  3.77 - 5.28 10*6/mm3 Final    Hemoglobin 06/17/2025 13.0  12.0 - 15.9 g/dL Final    Hematocrit 06/17/2025 39.5  34.0 - 46.6 % Final    MCV 06/17/2025 86.6  79.0 - 97.0 fL Final    MCH 06/17/2025 28.5  26.6 - 33.0 pg Final    MCHC 06/17/2025 32.9  31.5 - 35.7 g/dL Final    RDW 06/17/2025 13.7  12.3 - 15.4 % Final    RDW-SD 06/17/2025 44.4  37.0 - 54.0 fl Final    MPV 06/17/2025 10.4  6.0 - 12.0 fL Final    Platelets 06/17/2025 300  140 - 450 10*3/mm3 Final    Neutrophil % 06/17/2025 64.6  42.7 - 76.0 % Final    Lymphocyte % 06/17/2025 25.5  19.6 - 45.3 % Final    Monocyte % 06/17/2025 8.0  5.0 - 12.0 % Final    Eosinophil % 06/17/2025 1.0  0.3 - 6.2 % Final    Basophil % 06/17/2025 0.4  0.0 - 1.5 % Final    Immature Grans % 06/17/2025 0.5  0.0 - 0.5 % Final    Neutrophils, Absolute  06/17/2025 6.44  1.70 - 7.00 10*3/mm3 Final    Lymphocytes, Absolute 06/17/2025 2.54  0.70 - 3.10 10*3/mm3 Final    Monocytes, Absolute 06/17/2025 0.80  0.10 - 0.90 10*3/mm3 Final    Eosinophils, Absolute 06/17/2025 0.10  0.00 - 0.40 10*3/mm3 Final    Basophils, Absolute 06/17/2025 0.04  0.00 - 0.20 10*3/mm3 Final    Immature Grans, Absolute 06/17/2025 0.05  0.00 - 0.05 10*3/mm3 Final    HCG, Urine, QL 06/17/2025 Negative   Final    Lot Number 06/17/2025 [id number]   Final    Internal Positive Control 06/17/2025 Positive   Final    Internal Negative Control 06/17/2025 Negative   Final    Expiration Date 06/17/2025 10/17/2026   Final    RBC, UA 06/17/2025 3-5 (A)  None Seen, 0-2 /HPF Final    WBC, UA 06/17/2025 21-50 (A)  None Seen, 0-2 /HPF Final    Bacteria, UA 06/17/2025 None Seen  None Seen /HPF Final    Squamous Epithelial Cells, UA 06/17/2025 21-30 (A)  None Seen, 0-2 /HPF Final    Yeast, UA 06/17/2025 Small/1+ Budding Yeast w/Hyphae (A)  None Seen /HPF Final    Hyaline Casts, UA 06/17/2025 7-12  None Seen /LPF Final    Mucus, UA 06/17/2025 Moderate/2+ (A)  None Seen, Trace /HPF Final    Methodology 06/17/2025 Manual Light Microscopy   Final    Lactate 06/17/2025 1.2  0.5 - 2.0 mmol/L Final    Blood Culture 06/17/2025 No growth at 5 days   Final    Blood Culture 06/17/2025 No growth at 5 days   Final    Urine Culture 06/17/2025 No growth   Final   Admission on 06/15/2025, Discharged on 06/15/2025   Component Date Value Ref Range Status    Glucose 06/15/2025 109 (H)  65 - 99 mg/dL Final    BUN 06/15/2025 9.0  6.0 - 20.0 mg/dL Final    Creatinine 06/15/2025 0.78  0.57 - 1.00 mg/dL Final    Sodium 06/15/2025 134 (L)  136 - 145 mmol/L Final    Potassium 06/15/2025 3.6  3.5 - 5.2 mmol/L Final    Chloride 06/15/2025 100  98 - 107 mmol/L Final    CO2 06/15/2025 18.7 (L)  22.0 - 29.0 mmol/L Final    Calcium 06/15/2025 9.3  8.6 - 10.5 mg/dL Final    Total Protein 06/15/2025 7.9  6.0 - 8.5 g/dL Final    Albumin 06/15/2025  4.6  3.5 - 5.2 g/dL Final    ALT (SGPT) 06/15/2025 18  1 - 33 U/L Final    AST (SGOT) 06/15/2025 17  1 - 32 U/L Final    Alkaline Phosphatase 06/15/2025 81  39 - 117 U/L Final    Total Bilirubin 06/15/2025 0.9  0.0 - 1.2 mg/dL Final    Globulin 06/15/2025 3.3  gm/dL Final    A/G Ratio 06/15/2025 1.4  g/dL Final    BUN/Creatinine Ratio 06/15/2025 11.5  7.0 - 25.0 Final    Anion Gap 06/15/2025 15.3 (H)  5.0 - 15.0 mmol/L Final    eGFR 06/15/2025 108.3  >60.0 mL/min/1.73 Final    Lipase 06/15/2025 30  13 - 60 U/L Final    Color, UA 06/15/2025 Yellow  Yellow, Straw Final    Appearance, UA 06/15/2025 Turbid (A)  Clear Final    pH, UA 06/15/2025 6.0  5.0 - 8.0 Final    Specific Gravity, UA 06/15/2025 1.026  1.005 - 1.030 Final    Glucose, UA 06/15/2025 Negative  Negative Final    Ketones, UA 06/15/2025 15 mg/dL (1+) (A)  Negative Final    Bilirubin, UA 06/15/2025 Negative  Negative Final    Blood, UA 06/15/2025 Moderate (2+) (A)  Negative Final    Protein, UA 06/15/2025 30 mg/dL (1+) (A)  Negative Final    Leuk Esterase, UA 06/15/2025 Large (3+) (A)  Negative Final    Nitrite, UA 06/15/2025 Negative  Negative Final    Urobilinogen, UA 06/15/2025 1.0 E.U./dL  0.2 - 1.0 E.U./dL Final    Extra Tube 06/15/2025 Hold for add-ons.   Final    Auto resulted.    Extra Tube 06/15/2025 hold for add-on   Final    Auto resulted    Extra Tube 06/15/2025 Hold for add-ons.   Final    Auto resulted.    Extra Tube 06/15/2025 Hold for add-ons.   Final    Auto resulted    WBC 06/15/2025 19.27 (H)  3.40 - 10.80 10*3/mm3 Final    RBC 06/15/2025 4.70  3.77 - 5.28 10*6/mm3 Final    Hemoglobin 06/15/2025 13.5  12.0 - 15.9 g/dL Final    Hematocrit 06/15/2025 39.5  34.0 - 46.6 % Final    MCV 06/15/2025 84.0  79.0 - 97.0 fL Final    MCH 06/15/2025 28.7  26.6 - 33.0 pg Final    MCHC 06/15/2025 34.2  31.5 - 35.7 g/dL Final    RDW 06/15/2025 13.4  12.3 - 15.4 % Final    RDW-SD 06/15/2025 41.7  37.0 - 54.0 fl Final    MPV 06/15/2025 10.4  6.0 - 12.0 fL  Final    Platelets 06/15/2025 338  140 - 450 10*3/mm3 Final    Neutrophil % 06/15/2025 76.5 (H)  42.7 - 76.0 % Final    Lymphocyte % 06/15/2025 14.9 (L)  19.6 - 45.3 % Final    Monocyte % 06/15/2025 7.7  5.0 - 12.0 % Final    Eosinophil % 06/15/2025 0.0 (L)  0.3 - 6.2 % Final    Basophil % 06/15/2025 0.3  0.0 - 1.5 % Final    Immature Grans % 06/15/2025 0.6 (H)  0.0 - 0.5 % Final    Neutrophils, Absolute 06/15/2025 14.73 (H)  1.70 - 7.00 10*3/mm3 Final    Lymphocytes, Absolute 06/15/2025 2.88  0.70 - 3.10 10*3/mm3 Final    Monocytes, Absolute 06/15/2025 1.49 (H)  0.10 - 0.90 10*3/mm3 Final    Eosinophils, Absolute 06/15/2025 0.00  0.00 - 0.40 10*3/mm3 Final    Basophils, Absolute 06/15/2025 0.06  0.00 - 0.20 10*3/mm3 Final    Immature Grans, Absolute 06/15/2025 0.11 (H)  0.00 - 0.05 10*3/mm3 Final    nRBC 06/15/2025 0.0  0.0 - 0.2 /100 WBC Final    RBC, UA 06/15/2025 3-5 (A)  None Seen, 0-2 /HPF Final    WBC, UA 06/15/2025 Too Numerous to Count (A)  None Seen, 0-2 /HPF Final    Bacteria, UA 06/15/2025 2+ (A)  None Seen /HPF Final    Squamous Epithelial Cells, UA 06/15/2025 13-20 (A)  None Seen, 0-2 /HPF Final    Hyaline Casts, UA 06/15/2025 None Seen  None Seen /LPF Final    Mucus, UA 06/15/2025 Moderate/2+ (A)  None Seen, Trace /HPF Final    Methodology 06/15/2025 Manual Light Microscopy   Final    Urine Culture 06/15/2025 >100,000 CFU/mL Escherichia coli (A)   Final      Bladder & Bowel Symptom Questionnaire    How often do you usually urinate during the day ?   2 - About every 2-3 hours    2.   How many timed do you urinate at night?   0 - 0-1 time at night   3.   What is the reason that you usually urinate?   1 - Mild urge (can delay over an hour)   4.   Once you get the urge to go, how long can you     comfortably delay?   0 - More than 60 min   5.   How often do you get a sudden urge that makes you rush to the bathroom?   0 - Never   6.   How often does a sudden urge to urinate result in you leaking urine or  wetting pads?   0 - Never   7.  In your opinion, how good is your bladder control?   0 - Total control   8.  Do you have accidental bowel leakage?   no   9.  Do you have difficulty fully emptying your bladder?   no   10.  Do you experience accidental leakage when performing some physical activity such as coughing, sneezing, laughing or exercise?   no   11. Have you tried medications to help improve your symptoms?   no   12. Would you be interested in learning about a long-lasting option that may help you with your symptoms?   no                                                                             Total Score   3     0-7 (Mild) 8-16 (Moderate) 17-28 (Severe)         Assessment and Plan    Assessment & Plan  Acute pyelonephritis  Terrie Menjivar is a pleasant 25 y.o. female who presents for evaluation secondary to pyelonephritis noted on 2 consecutive CT scan 6/15/2025 post ER visit.  Presented to ED secondary to flank pain, pelvic pressure suprapubic discomfort, fevers, chills, urinary symptoms.  She is currently on antibiotic therapy and denies any side effects from her medications.    On clinic evaluation today 06/19/24 she is in no apparent discomfort and reports significant improvement in her prior symptoms. She denies any recent fevers, denies urinary frequency, urgency, or any burning with urination.  She denies pelvic pressure or suprapubic discomfort, does not have any flank or lower back pain today.  Denies N/V/D.  Her urinalysis today still showed trace leukocyte esterase, but is negative for gross/microscopic hematuria.  PVR is 0 mL, with a bladder symptom score of only 3-MILD    EDUCATION:We discussed Acute pyelonephritis AS a sudden and severe kidney infection. This causes the kidneys to swell and may permanently damage them if untreated.  Pyelonephritis can be life-threatening. We discussed the facts that, The infection usually starts in the lower urinary tract as a urinary tract infection  (UTI)( patient reports numerous UTIs during pregnancies but not recently). We discussed Bacteria enter the body through the urethra and begin to multiply and spread up to the bladder. From there, the bacteria travel through the ureters to the kidneys. Bacteria such as E. coli which she has often cause the infection. However, any serious infection in the bloodstream can also spread to the kidneys and cause acute pyelonephritis.     Recurrent urinary tract infections: We discussed the types of organisms that are found in the urinary tract indicating that the vast majority are results of the patient's own gastrointestinal santa.  We discussed how many of the antibiotics that are utilized can actually exacerbate these infections by creating resistant organisms and there is only a very few antibiotics that are concentrated in the urine and do not affect the rectal reservoir nor cause recurrent yeast vaginitis. We discussed the risk factors for recurrent infections being intercourse in younger patients and atrophic changes in older patients.  We discussed the symptoms that are found including pain, pressure, burning, frequency, urgency suprapubic pain and painful intercourse.  I discussed upper tract symptoms including fevers, chills, and indicated the workup would be much more aggressive if the patient were to present with recurrent infections in the face of upper tract symptomatology such as fever. I discussed the history of vesicoureteral reflux in young patients and finally chronic renal scarring as a result of such.         PLAN  We resent her urine for Guidance, culture. I will call her with results if any bacteria growth resistant to her current therapy with cephalexin.    Will Continue antibiotics as prescribed post ED visit.      She has been encouraged to increase her p.o. fluid intake to at least 1 to 2 L daily and avoid bladder irritants such as caffeine products, spicy foods, and citrusy foods.     I  recommend concomitant probiotics with treatment with antibiotics to protect the rectal reservoir including over-the-counter yogurt preparations to mikie oral pills containing the appropriate probiotics. Patient reports the diligent use of Probiotics.    If recurrent UTIs discussed lower tract investigation via cystoscopy-Deferred at this time,  will treat infections first.     We discussed  OTHER Treatment options for Her flank/lower pain with patient encouraged to continue conservative therapy alternating NSAID/Tylenol as tolerated, Also including hot baths, showers, warm compresses to lower back as tolerated. Also encouraged walking, physical therapy, light exercises as tolerated    Rest is the most important treatment in the case of flank pain. Rest and physical therapy are enough to improve minor pain. Discussed to monitor HIS daily routine with prevention of flank pain by: At least Drinking 8 glasses of water per day, Limiting your alcohol consumption.  Having a healthy diet containing fruits, vegetables, and a lot of fluids, Practicing safe sex.  Also maintaining proper hygiene of your body as well as the environment.    Discussed a kidney stone prevention diet to include increasing p.o. fluid intake, to at least 1 to 2 L of water daily.  SHE is to avoid caffeine products such as cola, coffee, and to avoid soft or soda drinks.  HE is to decrease her sodium consumption as in  Fast foods, inman, salted nuts, canned foods, and smoked/cured foods. SHE  is also to decrease her oxalate consumption, as in spinach, [place] greens, and Rhubarb.  Also important is to decrease protein intake, as in red meats, peanut butter, and also avoid nuts.    Strict bowel regiment due to her issues with severe constipation    Follow-up in clinic as discussed, with renal ultrasound prior    May return sooner if need be    Will go to ER if worsening chills/fevers/symptoms consistent with sepsis    Patient/mom Agreeable to plan of care      Orders:    POC Urinalysis Dipstick, Automated    ketorolac (TORADOL) 10 MG tablet; Take 1 tablet by mouth Every 6 (Six) Hours As Needed for Moderate Pain for up to 5 days.    US Abdomen Complete; Future    Acute cystitis with hematuria    Orders:    ketorolac (TORADOL) 10 MG tablet; Take 1 tablet by mouth Every 6 (Six) Hours As Needed for Moderate Pain for up to 5 days.    US Abdomen Complete; Future    Yeast vaginitis    Orders:    fluconazole (DIFLUCAN) 150 MG tablet; Take 1 tablet by mouth 1 (One) Time for 1 dose. May repeat in 3 days if symptomatic    Right flank pain    Orders:    US Abdomen Complete; Future    Right renal stone    Orders:    US Abdomen Complete; Future      Patient reports that she is not currently experiencing any symptoms of urinary incontinence.    BMI is >= 30 and <35. (Class 1 Obesity). The following options were offered after discussion;: weight loss educational material (shared in after visit summary), exercise counseling/recommendations, and nutrition counseling/recommendations    Smoking Cessation Counseling:  Never a smoker.  Patient does not currently use any tobacco products.       Follow Up   Return in about 7 weeks (around 8/6/2025) for Next scheduled follow up, MICRO HEMATURIA, RECURRENT UTI/DYSURIA, Cystoscopy, DR RODRÍGUEZ.    Patient was given instructions and counseling regarding her condition or for health maintenance advice. Please see specific information pulled into the AVS if appropriate.          This document has been electronically signed by Griselda Cheng-Akwa, APRN   June 23, 2025 17:49 EDT

## 2025-06-23 NOTE — ASSESSMENT & PLAN NOTE
Orders:    ketorolac (TORADOL) 10 MG tablet; Take 1 tablet by mouth Every 6 (Six) Hours As Needed for Moderate Pain for up to 5 days.    US Abdomen Complete; Future

## 2025-06-23 NOTE — ASSESSMENT & PLAN NOTE
Orders:    fluconazole (DIFLUCAN) 150 MG tablet; Take 1 tablet by mouth 1 (One) Time for 1 dose. May repeat in 3 days if symptomatic

## 2025-07-17 ENCOUNTER — TELEMEDICINE (OUTPATIENT)
Dept: INTERNAL MEDICINE | Facility: CLINIC | Age: 25
End: 2025-07-17
Payer: COMMERCIAL

## 2025-07-17 VITALS — WEIGHT: 212 LBS | HEIGHT: 68 IN | BODY MASS INDEX: 32.13 KG/M2

## 2025-07-17 DIAGNOSIS — F90.2 ATTENTION DEFICIT HYPERACTIVITY DISORDER (ADHD), COMBINED TYPE: Primary | ICD-10-CM

## 2025-07-17 PROCEDURE — 99214 OFFICE O/P EST MOD 30 MIN: CPT | Performed by: STUDENT IN AN ORGANIZED HEALTH CARE EDUCATION/TRAINING PROGRAM

## 2025-07-17 RX ORDER — VENLAFAXINE HYDROCHLORIDE 37.5 MG/1
37.5 CAPSULE, EXTENDED RELEASE ORAL DAILY
Qty: 90 CAPSULE | Refills: 0 | Status: SHIPPED | OUTPATIENT
Start: 2025-07-17

## 2025-07-17 RX ORDER — METRONIDAZOLE TOPICAL 7.5 MG/G
GEL TOPICAL
COMMUNITY
Start: 2025-03-28

## 2025-07-17 NOTE — PROGRESS NOTES
"Chief Complaint  ADHD    Subjective           Terrie Menjivar presents to [facility]  History of Present Illness  Presents for follow-up on ADHD.  Strattera was working very well for her symptoms but it was giving her extreme nausea.  States every single morning she was sick for at least 2 hours and Zofran would not help at all.  Just could not tolerate the medicine.  Without it ADHD has been very uncontrolled.  She has also failed Wellbutrin in the past    Objective   Vital Signs:   Ht 172.7 cm (68\")   Wt 96.2 kg (212 lb)   BMI 32.23 kg/m²     Estimated body mass index is 32.23 kg/m² as calculated from the following:    Height as of this encounter: 172.7 cm (68\").    Weight as of this encounter: 96.2 kg (212 lb).     Physical Exam   Constitutional: She appears well-developed and well-nourished.   HENT:   Head: Normocephalic and atraumatic.   Eyes: Conjunctivae are normal.   Neck: Neck normal appearance.  Pulmonary/Chest: Effort normal.   Neurological: She is alert.   Psychiatric: She has a normal mood and affect.     Result Review :                   Assessment and Plan      Diagnoses and all orders for this visit:    1. Attention deficit hyperactivity disorder (ADHD), combined type (Primary)  -     venlafaxine XR (Effexor XR) 37.5 MG 24 hr capsule; Take 1 capsule by mouth Daily.  Dispense: 90 capsule; Refill: 0    Seen for chronic worsening condition  Stop Strattera start Effexor.  Counseled can take 4 to 6 weeks to work.  If not beneficial by then will need referred to behavioral health for possible stimulant therapy      Follow Up     No follow-ups on file.  Patient was given instructions and counseling regarding her condition or for health maintenance advice. Please see specific information pulled into the AVS if appropriate.     Mode of Visit: Video  Location of patient: home  Location of provider: [facility] [place] [facility]  You have chosen to receive care through a telehealth " visit.  The patient has signed the video visit consent form.  The visit included audio and video interaction. No technical issues occurred during this visit.

## (undated) DEVICE — SUT MNCRYL 1/0 CT1 36IN Y947H BX/36

## (undated) DEVICE — SKIN AFFIX SURG ADHESIVE 72/CS 0.55ML: Brand: MEDLINE

## (undated) DEVICE — GLV SURG BIOGEL LTX PF 6

## (undated) DEVICE — SUT VIC 2/0 CT1 27IN J339H BX/36

## (undated) DEVICE — MAT PREVALON MOBL TRANSFR AIR W/PAD 39X80IN

## (undated) DEVICE — SUT PDS 0 CT1 36IN Z346H

## (undated) DEVICE — GLV SURG SENSICARE W/ALOE PF LF 7.5 STRL

## (undated) DEVICE — ADHS SKIN PREMIERPRO EXOFIN TOPICAL HI/VISC .5ML

## (undated) DEVICE — GLV SURG SENSICARE W/ALOE PF LF 7 STRL

## (undated) DEVICE — MAT PREVALON MOBL TRANSFR AIR W/PAD REPROC 39X81IN

## (undated) DEVICE — SOL IRR NACL 0.9PCT BT 1000ML

## (undated) DEVICE — SUT MONOCRYL SZ 4 0 18IN PS1 Y682H BX/36

## (undated) DEVICE — SUT PLAIN  3/0 CT1 27IN 842H

## (undated) DEVICE — TBG PENCL TELESCP MEGADYNE SMOKE EVAC 10FT

## (undated) DEVICE — CLTH CLENS READYCLEANSE PERI CARE PK/5

## (undated) DEVICE — SUT PDS 0 CTX 36IN DYED Z370T

## (undated) DEVICE — PATIENT RETURN ELECTRODE, SINGLE-USE, CONTACT QUALITY MONITORING, ADULT, WITH 9FT CORD, FOR PATIENTS WEIGING OVER 33LBS. (15KG): Brand: MEGADYNE

## (undated) DEVICE — APPL CHLORAPREP TINTED 26ML TEAL

## (undated) DEVICE — SUT VIC 3/0 CT1 27IN DYED J338H

## (undated) DEVICE — 4-PORT MANIFOLD: Brand: NEPTUNE 2

## (undated) DEVICE — PK C/SECT 10

## (undated) DEVICE — SOL IRR H2O BTL 1000ML STRL

## (undated) DEVICE — TRAP FLD MINIVAC MEGADYNE 100ML

## (undated) DEVICE — TRY SPINE BLCK WHITACRE 25G 3X5IN